# Patient Record
Sex: MALE | Race: WHITE | NOT HISPANIC OR LATINO | Employment: OTHER | URBAN - METROPOLITAN AREA
[De-identification: names, ages, dates, MRNs, and addresses within clinical notes are randomized per-mention and may not be internally consistent; named-entity substitution may affect disease eponyms.]

---

## 2017-06-15 ENCOUNTER — OFFICE VISIT (OUTPATIENT)
Dept: FAMILY MEDICINE CLINIC | Facility: CLINIC | Age: 27
End: 2017-06-15

## 2017-06-15 VITALS
HEIGHT: 71 IN | HEART RATE: 106 BPM | DIASTOLIC BLOOD PRESSURE: 84 MMHG | TEMPERATURE: 98.4 F | OXYGEN SATURATION: 96 % | BODY MASS INDEX: 19.25 KG/M2 | SYSTOLIC BLOOD PRESSURE: 102 MMHG | WEIGHT: 137.5 LBS

## 2017-06-15 DIAGNOSIS — R15.9 FECAL INCONTINENCE: ICD-10-CM

## 2017-06-15 DIAGNOSIS — F41.9 ANXIETY AND DEPRESSION: Primary | ICD-10-CM

## 2017-06-15 DIAGNOSIS — F32.A ANXIETY AND DEPRESSION: Primary | ICD-10-CM

## 2017-06-15 DIAGNOSIS — N52.9 ERECTILE DYSFUNCTION, UNSPECIFIED ERECTILE DYSFUNCTION TYPE: ICD-10-CM

## 2017-06-15 DIAGNOSIS — E10.65 TYPE 1 DIABETES MELLITUS WITH HYPERGLYCEMIA (HCC): ICD-10-CM

## 2017-06-15 PROCEDURE — 99204 OFFICE O/P NEW MOD 45 MIN: CPT | Performed by: NURSE PRACTITIONER

## 2017-06-15 RX ORDER — PAROXETINE HYDROCHLORIDE 40 MG/1
20 TABLET, FILM COATED ORAL 2 TIMES DAILY
COMMUNITY
Start: 2017-04-05 | End: 2020-11-04

## 2017-06-15 RX ORDER — PEN NEEDLE, DIABETIC 32GX 5/32"
NEEDLE, DISPOSABLE MISCELLANEOUS
COMMUNITY
Start: 2017-05-19 | End: 2020-12-30

## 2017-06-15 RX ORDER — INSULIN GLULISINE 100 [IU]/ML
INJECTION, SOLUTION SUBCUTANEOUS
COMMUNITY
Start: 2017-05-21 | End: 2019-10-08

## 2017-06-15 NOTE — PROGRESS NOTES
Subjective   Morro Blancas is a 26 y.o. male who presents today for:    Diabetes (DM2); Depression; Anxiety; Erectile Dysfunction; Insomnia; and Involuntary Bowel Movement (Having bowel movements in sleep)    HPI Comments: Mr. Blancas presents today to establish care with a PCP. He reports a medical history of Type 1 DM (diagnosed at the age of 17, which was 9 years ago), he is under the care of an endocrinologist. He also has a history of anxiety and depression. He reports being evaluated at [facility] for the anxiety and depression, was given a list of psychiatrists to follow up with and reports having difficulty finding one whom will take his insurance.     Diabetes   He presents for his initial (States he sees his endocrinologist today) diabetic visit. He has type 1 diabetes mellitus. Disease course: he states he is in poor control. Hypoglycemia symptoms include nervousness/anxiousness. Associated symptoms include polydipsia, polyuria and weight loss. Pertinent negatives for diabetes include no blurred vision, no chest pain, no fatigue and no visual change. There are no hypoglycemic complications. Diabetic complications include impotence. Pertinent negatives for diabetic complications include no autonomic neuropathy, nephropathy, peripheral neuropathy, PVD or retinopathy. Risk factors for coronary artery disease include diabetes mellitus. Current diabetic treatment includes insulin injections. He is compliant with treatment all of the time.   Anxiety   Presents for initial visit. Onset was at an unknown time. The problem has been waxing and waning. Symptoms include decreased concentration, excessive worry, impotence, insomnia, nervous/anxious behavior and obsessions. Patient reports no chest pain or palpitations. Symptoms occur most days. The severity of symptoms is causing significant distress. Nothing aggravates the symptoms. The quality of sleep is poor.     Past treatments include SSRIs. The treatment  "provided moderate relief. Compliance with prior treatments has been good.      I have reviewed the patient's medical history in detail and updated the computerized patient record.    Mr. Blancas  reports that he has been smoking Cigarettes.  He has never used smokeless tobacco. He reports that he drinks alcohol. He reports that he does not use illicit drugs.         Current Outpatient Prescriptions:   •  APIDRA SOLOSTAR 100 UNIT/ML solution pen-injector, , Disp: , Rfl:   •  BD PEN NEEDLE HENRY U/F 32G X 4 MM misc, , Disp: , Rfl:   •  Insulin Glargine (TOUJEO SOLOSTAR) 300 UNIT/ML solution pen-injector, Inject  under the skin., Disp: , Rfl:   •  PARoxetine (PAXIL) 40 MG tablet, Take 40 mg by mouth Daily., Disp: , Rfl:       The following portions of the patient's history were reviewed and updated as appropriate: allergies, current medications, past social history and problem list.    Review of Systems   Constitutional: Positive for weight loss. Negative for fatigue.   HENT: Negative.    Eyes: Negative.  Negative for blurred vision.   Respiratory: Negative.    Cardiovascular: Negative for chest pain and palpitations.   Gastrointestinal: Negative.    Endocrine: Positive for polydipsia and polyuria.   Genitourinary: Positive for impotence.   Musculoskeletal: Negative.    Skin: Negative.    Allergic/Immunologic: Negative.    Neurological: Negative.    Psychiatric/Behavioral: Positive for decreased concentration. The patient is nervous/anxious and has insomnia.          Objective   Vitals:    06/15/17 1049   BP: 102/84   BP Location: Left arm   Patient Position: Sitting   Cuff Size: Adult   Pulse: 106   Temp: 98.4 °F (36.9 °C)   TempSrc: Oral   SpO2: 96%   Weight: 137 lb 8 oz (62.4 kg)   Height: 70.75\" (179.7 cm)     Physical Exam   Constitutional: He is oriented to person, place, and time. He appears well-developed and well-nourished.   HENT:   Head: Normocephalic.   Right Ear: External ear normal.   Left Ear: External " ear normal.   Nose: Nose normal.   Mouth/Throat: Oropharynx is clear and moist.   Eyes: Conjunctivae and EOM are normal. Pupils are equal, round, and reactive to light.   Neck: Normal range of motion. Neck supple. No JVD present. No tracheal deviation present. No thyromegaly present.   Cardiovascular: Normal rate, regular rhythm, normal heart sounds and intact distal pulses.  Exam reveals no gallop and no friction rub.    No murmur heard.  Pulmonary/Chest: Effort normal and breath sounds normal.   Abdominal: Soft. Bowel sounds are normal. He exhibits no distension and no mass. There is no tenderness. There is no rebound and no guarding. No hernia.   Musculoskeletal: Normal range of motion. He exhibits no edema, tenderness or deformity.   Lymphadenopathy:     He has no cervical adenopathy.   Neurological: He is alert and oriented to person, place, and time.   Skin: Skin is warm and dry.   Psychiatric:   No acute distress   Vitals reviewed.        Assessment/Plan   Morro was seen today for diabetes, depression, anxiety, erectile dysfunction, insomnia and involuntary bowel movement.    Diagnoses and all orders for this visit:    Anxiety and depression  -     Ambulatory Referral to Psychiatry    Erectile dysfunction, unspecified erectile dysfunction type  -     Ambulatory Referral to Urology    Fecal incontinence  -     Ambulatory Referral For Screening Colonoscopy    Type 1 diabetes mellitus with hyperglycemia    1. Type 1 DM. Patient is to follow up with endocrinology as scheduled. I have asked that his labs and medical records be obtained for his endocrinologist's office for my review.  2. Anxiety/depression. I have sent in a referral to Psychiatry for further evaluation and management.  3. ED. I have sent in a referral for Urology for further evaluation and management.  4. I am ordering a colonoscopy for evaluation of fecal incontinence.  5. Follow up with me in 6 months or as needed.

## 2017-06-27 ENCOUNTER — PREP FOR SURGERY (OUTPATIENT)
Dept: OTHER | Facility: HOSPITAL | Age: 27
End: 2017-06-27

## 2017-06-27 DIAGNOSIS — R19.7 DIARRHEA, UNSPECIFIED TYPE: Primary | ICD-10-CM

## 2017-08-30 ENCOUNTER — HOSPITAL ENCOUNTER (OUTPATIENT)
Facility: HOSPITAL | Age: 27
Setting detail: HOSPITAL OUTPATIENT SURGERY
Discharge: HOME | End: 2017-08-30
Attending: INTERNAL MEDICINE | Admitting: INTERNAL MEDICINE

## 2017-08-30 ENCOUNTER — ANESTHESIA (OUTPATIENT)
Dept: GASTROENTEROLOGY | Facility: HOSPITAL | Age: 27
End: 2017-08-30

## 2017-08-30 ENCOUNTER — ANESTHESIA EVENT (OUTPATIENT)
Dept: GASTROENTEROLOGY | Facility: HOSPITAL | Age: 27
End: 2017-08-30

## 2017-08-30 VITALS
RESPIRATION RATE: 15 BRPM | BODY MASS INDEX: 19.32 KG/M2 | HEIGHT: 69 IN | TEMPERATURE: 97.4 F | DIASTOLIC BLOOD PRESSURE: 91 MMHG | HEART RATE: 95 BPM | OXYGEN SATURATION: 96 % | WEIGHT: 130.44 LBS | SYSTOLIC BLOOD PRESSURE: 124 MMHG

## 2017-08-30 DIAGNOSIS — R19.7 DIARRHEA, UNSPECIFIED TYPE: ICD-10-CM

## 2017-08-30 LAB — GLUCOSE BLDC GLUCOMTR-MCNC: 205 MG/DL (ref 70–130)

## 2017-08-30 PROCEDURE — S0260 H&P FOR SURGERY: HCPCS | Performed by: INTERNAL MEDICINE

## 2017-08-30 PROCEDURE — 45380 COLONOSCOPY AND BIOPSY: CPT | Performed by: INTERNAL MEDICINE

## 2017-08-30 PROCEDURE — 25010000002 PROPOFOL 10 MG/ML EMULSION: Performed by: ANESTHESIOLOGY

## 2017-08-30 PROCEDURE — 82962 GLUCOSE BLOOD TEST: CPT

## 2017-08-30 PROCEDURE — 88305 TISSUE EXAM BY PATHOLOGIST: CPT | Performed by: INTERNAL MEDICINE

## 2017-08-30 RX ORDER — SODIUM CHLORIDE, SODIUM LACTATE, POTASSIUM CHLORIDE, CALCIUM CHLORIDE 600; 310; 30; 20 MG/100ML; MG/100ML; MG/100ML; MG/100ML
30 INJECTION, SOLUTION INTRAVENOUS CONTINUOUS PRN
Status: DISCONTINUED | OUTPATIENT
Start: 2017-08-30 | End: 2017-08-30 | Stop reason: HOSPADM

## 2017-08-30 RX ORDER — LIDOCAINE HYDROCHLORIDE 20 MG/ML
INJECTION, SOLUTION INFILTRATION; PERINEURAL AS NEEDED
Status: DISCONTINUED | OUTPATIENT
Start: 2017-08-30 | End: 2017-08-30 | Stop reason: SURG

## 2017-08-30 RX ORDER — PROPOFOL 10 MG/ML
VIAL (ML) INTRAVENOUS AS NEEDED
Status: DISCONTINUED | OUTPATIENT
Start: 2017-08-30 | End: 2017-08-30 | Stop reason: SURG

## 2017-08-30 RX ADMIN — PROPOFOL 200 MG: 10 INJECTION, EMULSION INTRAVENOUS at 09:24

## 2017-08-30 RX ADMIN — LIDOCAINE HYDROCHLORIDE 100 MG: 20 INJECTION, SOLUTION INFILTRATION; PERINEURAL at 09:24

## 2017-08-30 RX ADMIN — SODIUM CHLORIDE, POTASSIUM CHLORIDE, SODIUM LACTATE AND CALCIUM CHLORIDE 30 ML/HR: 600; 310; 30; 20 INJECTION, SOLUTION INTRAVENOUS at 09:08

## 2017-08-30 RX ADMIN — PROPOFOL 200 MG: 10 INJECTION, EMULSION INTRAVENOUS at 09:30

## 2017-08-30 NOTE — ANESTHESIA PREPROCEDURE EVALUATION
Anesthesia Evaluation     Patient summary reviewed and Nursing notes reviewed   NPO Solid Status: > 8 hours  NPO Liquid Status: > 8 hours     Airway   Mallampati: II  TM distance: <3 FB  Neck ROM: full  no difficulty expected, small opening and Narrow palate  Dental - normal exam   (+) poor dentition    Pulmonary - normal exam   Cardiovascular - normal exam    Rate: abnormal        Neuro/Psych  (+) psychiatric history,    GI/Hepatic/Renal/Endo    (+)  diabetes mellitus type 1 well controlled using insulin,     Musculoskeletal     Abdominal  - normal exam    Bowel sounds: normal.   Substance History      OB/GYN          Other                                        Anesthesia Plan    ASA 2     MAC     Anesthetic plan and risks discussed with patient.

## 2017-08-30 NOTE — ANESTHESIA POSTPROCEDURE EVALUATION
Patient: Morro Gividen    Procedure Summary     Date Anesthesia Start Anesthesia Stop Room / Location    08/30/17 0924 0953  [facility]       Procedure Diagnosis Surgeon Provider    COLONOSCOPY TO CECUM AND TERM. ILEUM WITH BIOPSIES AND COLD POLYPECTOMY (N/A ) Diarrhea, unspecified type  (Diarrhea, unspecified type [R19.7]) MD Criselda Mcwilliams MD          Anesthesia Type: MAC  Last vitals  BP   124/91 (08/30/17 1013)    Temp   36.3 °C (97.4 °F) (08/30/17 1013)    Pulse   95 (08/30/17 1013)   Resp   15 (08/30/17 1013)    SpO2   96 % (08/30/17 1013)      Post Anesthesia Care and Evaluation    Patient location during evaluation: PACU  Patient participation: complete - patient participated  Level of consciousness: awake and alert  Pain score: 0  Pain management: adequate  Airway patency: patent  Anesthetic complications: No anesthetic complications    Cardiovascular status: acceptable  Respiratory status: acceptable  Hydration status: acceptable

## 2017-08-31 ENCOUNTER — TELEPHONE (OUTPATIENT)
Dept: GASTROENTEROLOGY | Facility: CLINIC | Age: 27
End: 2017-08-31

## 2017-08-31 DIAGNOSIS — K59.1 FUNCTIONAL DIARRHEA: Primary | ICD-10-CM

## 2017-08-31 LAB
CYTO UR: NORMAL
LAB AP CASE REPORT: NORMAL
Lab: NORMAL
PATH REPORT.FINAL DX SPEC: NORMAL
PATH REPORT.GROSS SPEC: NORMAL

## 2017-08-31 RX ORDER — MONTELUKAST SODIUM 4 MG/1
TABLET, CHEWABLE ORAL
Qty: 60 TABLET | Refills: 11 | Status: SHIPPED | OUTPATIENT
Start: 2017-08-31 | End: 2019-10-08

## 2017-09-05 ENCOUNTER — RESULTS ENCOUNTER (OUTPATIENT)
Dept: GASTROENTEROLOGY | Facility: CLINIC | Age: 27
End: 2017-09-05

## 2017-09-05 DIAGNOSIS — K59.1 FUNCTIONAL DIARRHEA: ICD-10-CM

## 2017-09-12 LAB
GLIADIN PEPTIDE IGA SER-ACNC: 6 UNITS (ref 0–19)
GLIADIN PEPTIDE IGG SER-ACNC: 2 UNITS (ref 0–19)
IGA SERPL-MCNC: 369 MG/DL (ref 90–386)
TTG IGA SER-ACNC: <2 U/ML (ref 0–3)
TTG IGG SER-ACNC: <2 U/ML (ref 0–5)

## 2017-09-15 ENCOUNTER — DOCUMENTATION (OUTPATIENT)
Dept: FAMILY MEDICINE CLINIC | Facility: CLINIC | Age: 27
End: 2017-09-15

## 2017-09-15 NOTE — PROGRESS NOTES
I was notified by Betty Mcdonald's office that Mr. Blancas cancelled his psych. evaluation appointment.

## 2017-09-22 ENCOUNTER — TELEPHONE (OUTPATIENT)
Dept: GASTROENTEROLOGY | Facility: CLINIC | Age: 27
End: 2017-09-22

## 2017-09-22 NOTE — TELEPHONE ENCOUNTER
Call to pt.  Advise per DR Aguayo that celiac panel normal.    Pt verb understanding and states that symptoms have stopped.      Update to Dr Aguayo.

## 2017-09-22 NOTE — TELEPHONE ENCOUNTER
----- Message from Lena Aguayo MD sent at 9/12/2017  9:57 PM EDT -----  Celiac panel was normal - how are his symptoms?

## 2017-12-15 ENCOUNTER — OFFICE VISIT (OUTPATIENT)
Dept: FAMILY MEDICINE CLINIC | Facility: CLINIC | Age: 27
End: 2017-12-15

## 2017-12-15 VITALS
BODY MASS INDEX: 18.22 KG/M2 | OXYGEN SATURATION: 94 % | HEART RATE: 111 BPM | HEIGHT: 69 IN | WEIGHT: 123 LBS | DIASTOLIC BLOOD PRESSURE: 82 MMHG | SYSTOLIC BLOOD PRESSURE: 110 MMHG | TEMPERATURE: 97.6 F

## 2017-12-15 DIAGNOSIS — Z13.220 SCREENING FOR HYPERLIPIDEMIA: Primary | ICD-10-CM

## 2017-12-15 DIAGNOSIS — E10.65 TYPE 1 DIABETES MELLITUS WITH HYPERGLYCEMIA (HCC): ICD-10-CM

## 2017-12-15 LAB
ALBUMIN SERPL-MCNC: 4.7 G/DL (ref 3.5–5.2)
ALBUMIN/GLOB SERPL: 1.7 G/DL
ALP SERPL-CCNC: 93 U/L (ref 39–117)
ALT SERPL-CCNC: 13 U/L (ref 1–41)
AST SERPL-CCNC: 9 U/L (ref 1–40)
BILIRUB SERPL-MCNC: 0.9 MG/DL (ref 0.1–1.2)
BUN SERPL-MCNC: 12 MG/DL (ref 6–20)
BUN/CREAT SERPL: 12.9 (ref 7–25)
CALCIUM SERPL-MCNC: 9.9 MG/DL (ref 8.6–10.5)
CHLORIDE SERPL-SCNC: 98 MMOL/L (ref 98–107)
CHOLEST SERPL-MCNC: 147 MG/DL (ref 0–200)
CHOLEST/HDLC SERPL: 2.58 {RATIO}
CO2 SERPL-SCNC: 27.7 MMOL/L (ref 22–29)
CREAT SERPL-MCNC: 0.93 MG/DL (ref 0.76–1.27)
ERYTHROCYTE [DISTWIDTH] IN BLOOD BY AUTOMATED COUNT: 13.3 % (ref 11.5–14.5)
GFR SERPLBLD CREATININE-BSD FMLA CKD-EPI: 118 ML/MIN/1.73
GFR SERPLBLD CREATININE-BSD FMLA CKD-EPI: 97 ML/MIN/1.73
GLOBULIN SER CALC-MCNC: 2.7 GM/DL
GLUCOSE SERPL-MCNC: 248 MG/DL (ref 65–99)
HBA1C MFR BLD: 10.9 % (ref 4.8–5.6)
HCT VFR BLD AUTO: 48.9 % (ref 40.4–52.2)
HDLC SERPL-MCNC: 57 MG/DL (ref 40–60)
HGB BLD-MCNC: 16.7 G/DL (ref 13.7–17.6)
LDLC SERPL CALC-MCNC: 64 MG/DL (ref 0–100)
MCH RBC QN AUTO: 30.4 PG (ref 27–32.7)
MCHC RBC AUTO-ENTMCNC: 34.2 G/DL (ref 32.6–36.4)
MCV RBC AUTO: 88.9 FL (ref 79.8–96.2)
PLATELET # BLD AUTO: 316 10*3/MM3 (ref 140–500)
POTASSIUM SERPL-SCNC: 5.1 MMOL/L (ref 3.5–5.2)
PROT SERPL-MCNC: 7.4 G/DL (ref 6–8.5)
RBC # BLD AUTO: 5.5 10*6/MM3 (ref 4.6–6)
SODIUM SERPL-SCNC: 141 MMOL/L (ref 136–145)
TRIGL SERPL-MCNC: 131 MG/DL (ref 0–150)
VLDLC SERPL CALC-MCNC: 26.2 MG/DL (ref 5–40)
WBC # BLD AUTO: 11.01 10*3/MM3 (ref 4.5–10.7)

## 2017-12-15 PROCEDURE — 99214 OFFICE O/P EST MOD 30 MIN: CPT | Performed by: NURSE PRACTITIONER

## 2017-12-15 RX ORDER — INSULIN GLARGINE 100 [IU]/ML
INJECTION, SOLUTION SUBCUTANEOUS
COMMUNITY
End: 2019-10-08 | Stop reason: ALTCHOICE

## 2017-12-15 NOTE — PROGRESS NOTES
Subjective   Morro Blancas is a 27 y.o. male.     Diabetes; Anxiety; and Depression     HPI Comments: Mr. Blancas presents today for a 6 month follow up on his anxiety/deprssion and Type 1 diabetes. He is being follow by Dr. Javier for his diabetes management. He reports that he has been wearing a continuous glucose monitor for the past month. He reports that his levels are averaging on the high side. He brought with him an order to have an A1C to be done today and faxed to Dr. Javier's office.    He states that he continues to take the Paroxetine 40 mg for his anxiety and depression. I had inquired with him why he did not show up at his appointment with Betty Mcdonald for psychotherapy evaluation and he stated that his insurance would not cover the appointment.     Diabetes   He has type 1 diabetes mellitus. No MedicAlert identification noted. The initial diagnosis of diabetes was made 10 years ago. Hypoglycemia symptoms include nervousness/anxiousness. Associated symptoms include blurred vision, fatigue, foot paresthesias, polyuria and weakness. Hypoglycemia complications include nocturnal hypoglycemia and required assistance. Symptoms are worsening. Diabetic complications include PVD. Risk factors for coronary artery disease include family history and tobacco exposure. Current diabetic treatment includes insulin injections. He is currently taking insulin pre-breakfast, pre-lunch, pre-dinner and at bedtime. Insulin injections are given by patient. Rotation sites for injection include the abdominal wall and thighs. His weight is fluctuating minimally. He is following a diabetic diet. Meal planning includes carbohydrate counting. He has not had a previous visit with a dietitian. He participates in exercise intermittently. He monitors blood glucose at home 3-4 x per day. He monitors urine at home <1 x per month. Blood glucose monitoring compliance is excellent. His home blood glucose trend is fluctuating minimally  (wears a continuous glucose monitor). His breakfast blood glucose is taken between 9-10 am. His breakfast blood glucose range is generally 70-90 mg/dl. His lunch blood glucose is taken after 3 pm. His lunch blood glucose range is generally >200 mg/dl. His dinner blood glucose is taken after 8 pm. His dinner blood glucose range is generally 130-140 mg/dl. His highest blood glucose is >200 mg/dl. His overall blood glucose range is 180-200 mg/dl. He does not see a podiatrist.Eye exam is not current.   Anxiety   Presents for follow-up visit. Symptoms include decreased concentration, depressed mood and nervous/anxious behavior. Symptoms occur most days. The severity of symptoms is moderate.     His past medical history is significant for depression.   Depression   Visit Type: follow-up  Patient presents with the following symptoms: decreased concentration, depressed mood and nervousness/anxiety.  Frequency of symptoms: most days   Compliance with medications:  %            The following portions of the patient's history were reviewed and updated as appropriate: allergies, current medications, past family history, past medical history, past social history, past surgical history and problem list.       Current Outpatient Prescriptions:   •  APIDRA SOLOSTAR 100 UNIT/ML solution pen-injector, , Disp: , Rfl:   •  BD PEN NEEDLE HENRY U/F 32G X 4 MM misc, , Disp: , Rfl:   •  colestipol (COLESTID) 1 g tablet, Start with one pill daily and increase to 2 daily if no improvement after 2 weeks, Disp: 60 tablet, Rfl: 11  •  Insulin Glargine (BASAGLAR KWIKPEN) 100 UNIT/ML injection pen, Inject  under the skin., Disp: , Rfl:   •  PARoxetine (PAXIL) 40 MG tablet, Take 20 mg by mouth 2 (Two) Times a Day., Disp: , Rfl:   •  Insulin Glargine (TOUJEO SOLOSTAR) 300 UNIT/ML solution pen-injector, Inject  under the skin., Disp: , Rfl:     Review of Systems   Constitutional: Positive for fatigue.   Eyes: Positive for blurred vision.    Respiratory: Negative.    Cardiovascular: Negative.    Endocrine: Positive for polyuria.   Skin: Negative.    Neurological: Positive for weakness.   Psychiatric/Behavioral: Positive for decreased concentration. The patient is nervous/anxious.        Objective    Vitals:    12/15/17 0951   BP: 110/82   Pulse: 111   Temp: 97.6 °F (36.4 °C)   SpO2: 94%     Physical Exam   Constitutional: He is oriented to person, place, and time. He appears well-developed and well-nourished.   HENT:   His teeth are discolored.    Cardiovascular: Normal rate, regular rhythm, normal heart sounds and intact distal pulses.    Pulmonary/Chest: Effort normal and breath sounds normal.   Neurological: He is alert and oriented to person, place, and time.   Skin: Skin is warm and dry.   Psychiatric:   No acute distress   Vitals reviewed.      Assessment/Plan   Morro was seen today for diabetes, anxiety and depression.    Diagnoses and all orders for this visit:    Screening for hyperlipidemia  -     Lipid Panel With / Chol / HDL Ratio    Type 1 diabetes mellitus with hyperglycemia  -     CBC (No Diff)  -     Comprehensive Metabolic Panel  -     Hemoglobin A1c       1. I have ordered a CBC, CMP, lipids and A1C to be done today. The results of the A1C will be faxed to Dr. Javier.  2. We discussed keeping up with his dental health and also that he needs to follow up with an eye exam.  3. He is to continue all medications as prescribed.  4. Follow up in 1 year.

## 2017-12-18 NOTE — PROGRESS NOTES
Please call the patient regarding his abnormal result. Let him know we will send the results to Dr. Hamilton

## 2019-10-08 ENCOUNTER — OFFICE VISIT (OUTPATIENT)
Dept: GASTROENTEROLOGY | Facility: CLINIC | Age: 29
End: 2019-10-08

## 2019-10-08 VITALS
SYSTOLIC BLOOD PRESSURE: 116 MMHG | HEIGHT: 69 IN | WEIGHT: 130.8 LBS | BODY MASS INDEX: 19.37 KG/M2 | DIASTOLIC BLOOD PRESSURE: 70 MMHG | TEMPERATURE: 98.2 F

## 2019-10-08 DIAGNOSIS — R68.81 EARLY SATIETY: ICD-10-CM

## 2019-10-08 DIAGNOSIS — E10.65 TYPE 1 DIABETES MELLITUS WITH HYPERGLYCEMIA (HCC): ICD-10-CM

## 2019-10-08 DIAGNOSIS — R15.2 INCONTINENCE OF FECES WITH FECAL URGENCY: ICD-10-CM

## 2019-10-08 DIAGNOSIS — K59.00 CONSTIPATION, UNSPECIFIED CONSTIPATION TYPE: ICD-10-CM

## 2019-10-08 DIAGNOSIS — R15.9 INCONTINENCE OF FECES WITH FECAL URGENCY: ICD-10-CM

## 2019-10-08 DIAGNOSIS — K21.9 GASTROESOPHAGEAL REFLUX DISEASE, ESOPHAGITIS PRESENCE NOT SPECIFIED: Primary | ICD-10-CM

## 2019-10-08 PROCEDURE — 99214 OFFICE O/P EST MOD 30 MIN: CPT | Performed by: NURSE PRACTITIONER

## 2019-10-08 RX ORDER — PANTOPRAZOLE SODIUM 40 MG/1
40 TABLET, DELAYED RELEASE ORAL DAILY
Qty: 30 TABLET | Refills: 11 | Status: SHIPPED | OUTPATIENT
Start: 2019-10-08 | End: 2020-08-05 | Stop reason: ALTCHOICE

## 2019-10-08 NOTE — PROGRESS NOTES
Chief Complaint   Patient presents with   • Diarrhea     x 2-3 years       Morro Blancas is a  29 y.o. male here for a follow up visit for diarrhea.    HPI  29-year-old male presents today accompanied by his significant other for follow-up visit for diarrhea and GERD.  He is a patient of Dr. Aguayo.  He has not been seen since 2017.  He underwent screening colonoscopy on 8/30/2017 that showed 1 polyp in the sigmoid colon.  Path was positive for hyperplastic polyp.  He reports for the past 2 to 3 years he has had chronic constipation where he will have a bowel movement for 2 to 3 to 4 days and then he might have diarrhea with fecal incontinence at night while he is asleep.  He tells me there is no rhyme or reason to this.  He does not think foods make it better or worse.  He does have a history of type 1 diabetes and admits he does not follow a low-carb diet.  He does feel full a lot and complains of a lot of nausea and bad breath with reflux symptoms.  He has taken Prilosec over-the-counter every once in a while but nothing routinely.  He has never had an EGD before.  He has never had a gastric emptying study done before.  He has not tried anything over-the-counter for the constipation.  He also uses tobacco but denies any alcohol.  He denies any dysphagia, abdominal pain, vomiting, rectal bleeding or melena.  He admits his appetite's okay and his weight is stable.  He tells me his last hemoglobin A1c was good for him at 8.1.  He tells me he does like to eat late at night and he does not eat a lot of greasy fried foods.    Past Medical History:   Diagnosis Date   • Anxiety    • Anxiety and depression    • Depression    • Diabetes type 1, uncontrolled (CMS/HCC)     diagnosed at age 17       Past Surgical History:   Procedure Laterality Date   • COLONOSCOPY N/A 8/30/2017    One 3 mm polyp in the sigmoid colon, Granularity in the terminal ileum       Scheduled Meds:    Continuous Infusions:  No current  facility-administered medications for this visit.     PRN Meds:.    No Known Allergies    Social History     Socioeconomic History   • Marital status: Single     Spouse name: Not on file   • Number of children: Not on file   • Years of education: Not on file   • Highest education level: Not on file   Tobacco Use   • Smoking status: Current Every Day Smoker     Packs/day: 1.00     Types: Cigarettes     Start date: 2005   • Smokeless tobacco: Never Used   Substance and Sexual Activity   • Alcohol use: Yes     Comment: occasional   • Drug use: No   • Sexual activity: Defer       Family History   Problem Relation Age of Onset   • Cancer Mother    • Hypertension Father    • Depression Paternal Uncle    • Hypertension Maternal Grandmother    • Hypertension Maternal Grandfather    • Alcohol abuse Maternal Grandfather    • Hypertension Paternal Grandmother    • Cancer Paternal Grandmother    • COPD Paternal Grandmother    • Depression Paternal Grandmother    • Heart disease Paternal Grandfather    • Hypertension Paternal Grandfather        Review of Systems   Constitutional: Negative for appetite change, chills, diaphoresis, fatigue, fever and unexpected weight change.   HENT: Negative for nosebleeds, postnasal drip, sore throat, trouble swallowing and voice change.    Respiratory: Negative for cough, choking, chest tightness, shortness of breath and wheezing.    Cardiovascular: Negative for chest pain, palpitations and leg swelling.   Gastrointestinal: Positive for abdominal distention, constipation, diarrhea and nausea. Negative for abdominal pain, anal bleeding, blood in stool, rectal pain and vomiting.   Endocrine: Negative for polydipsia, polyphagia and polyuria.   Musculoskeletal: Negative for gait problem.   Skin: Negative for rash and wound.   Allergic/Immunologic: Negative for food allergies.   Neurological: Negative for dizziness, speech difficulty and light-headedness.   Psychiatric/Behavioral: Negative for  confusion, self-injury, sleep disturbance and suicidal ideas.       Vitals:    10/08/19 1547   BP: 116/70   Temp: 98.2 °F (36.8 °C)       Physical Exam   Constitutional: He is oriented to person, place, and time. He appears well-developed and well-nourished. He does not appear ill. No distress.   HENT:   Head: Normocephalic.   Eyes: Pupils are equal, round, and reactive to light.   Cardiovascular: Normal rate, regular rhythm and normal heart sounds.   Pulmonary/Chest: Effort normal and breath sounds normal.   Abdominal: Soft. Bowel sounds are normal. He exhibits distension. He exhibits no mass. There is no hepatosplenomegaly. There is no tenderness. There is no rebound and no guarding. No hernia.   Musculoskeletal: Normal range of motion.   Neurological: He is alert and oriented to person, place, and time.   Skin: Skin is warm and dry.   Psychiatric: He has a normal mood and affect. His speech is normal and behavior is normal. Judgment normal.       No images are attached to the encounter.    Assessment and plan     1. Gastroesophageal reflux disease, esophagitis presence not specified  - NM Gastric Emptying; Future    2. Constipation, unspecified constipation type  - NM Gastric Emptying; Future    3. Incontinence of feces with fecal urgency  - NM Gastric Emptying; Future    4. Early satiety  - NM Gastric Emptying; Future    5. Type 1 diabetes mellitus with hyperglycemia (CMS/HCC)  - NM Gastric Emptying; Future    It sounds like to me he has a couple of issues going on.  Reflux is not well controlled at all.  Recommend he start Protonix 40 mill grams once daily.  Continue GERD precautions.  Had a long discussion with him about constipation and needing a routine bowel regimen.  Sounds like he is getting chronically constipated and then having overflow diarrhea.  Recommend he start daily MiraLAX.  Samples given today.  Also having issues with possible gastroparesis due to his uncontrolled diabetes.  Will get a gastric  emptying study.  Will go ahead and give him a handout on gastroparesis today.  Discussed the need for him to eat smaller more frequent meals and to stay more on a low-carb diet.  Patient to call the office later this week with an update.  Patient to follow-up with me in the office in 2 weeks.

## 2019-10-15 ENCOUNTER — PRIOR AUTHORIZATION (OUTPATIENT)
Dept: GASTROENTEROLOGY | Facility: CLINIC | Age: 29
End: 2019-10-15

## 2019-10-21 ENCOUNTER — HOSPITAL ENCOUNTER (OUTPATIENT)
Dept: NUCLEAR MEDICINE | Facility: HOSPITAL | Age: 29
Discharge: HOME | End: 2019-10-21

## 2019-10-21 DIAGNOSIS — E10.65 TYPE 1 DIABETES MELLITUS WITH HYPERGLYCEMIA (HCC): ICD-10-CM

## 2019-10-21 DIAGNOSIS — R15.2 INCONTINENCE OF FECES WITH FECAL URGENCY: ICD-10-CM

## 2019-10-21 DIAGNOSIS — K21.9 GASTROESOPHAGEAL REFLUX DISEASE, ESOPHAGITIS PRESENCE NOT SPECIFIED: ICD-10-CM

## 2019-10-21 DIAGNOSIS — K59.00 CONSTIPATION, UNSPECIFIED CONSTIPATION TYPE: ICD-10-CM

## 2019-10-21 DIAGNOSIS — R68.81 EARLY SATIETY: ICD-10-CM

## 2019-10-21 DIAGNOSIS — R15.9 INCONTINENCE OF FECES WITH FECAL URGENCY: ICD-10-CM

## 2019-10-21 PROCEDURE — 78264 GASTRIC EMPTYING IMG STUDY: CPT

## 2019-10-21 PROCEDURE — 0 TECHNETIUM SULFUR COLLOID: Performed by: NURSE PRACTITIONER

## 2019-10-21 PROCEDURE — A9541 TC99M SULFUR COLLOID: HCPCS | Performed by: NURSE PRACTITIONER

## 2019-10-21 RX ADMIN — TECHNETIUM TC 99M SULFUR COLLOID 1 DOSE: KIT at 07:00

## 2019-10-22 ENCOUNTER — TELEPHONE (OUTPATIENT)
Dept: GASTROENTEROLOGY | Facility: CLINIC | Age: 29
End: 2019-10-22

## 2019-10-22 NOTE — TELEPHONE ENCOUNTER
----- Message from YUDELKA Almanza sent at 10/22/2019  8:58 AM EDT -----  Please call the patient and let him know on his gastric emptying study it did show delay of 17% when normal is 10% after 4 hours.  So he needs to make an appointment to come in and discuss treatment options and diet modifications.

## 2019-10-24 NOTE — TELEPHONE ENCOUNTER
Called pt and advised per Ellyn juarez this gastric emptying study did show delay of 17% when normal is 10% after 4 hrs.  So he needs to make an appt to come in and discuss treatment options and diet modification.  Pt verb understanding and will call back to make appt.

## 2019-11-12 ENCOUNTER — OFFICE VISIT (OUTPATIENT)
Dept: GASTROENTEROLOGY | Facility: CLINIC | Age: 29
End: 2019-11-12

## 2019-11-12 VITALS
BODY MASS INDEX: 19.96 KG/M2 | HEIGHT: 69 IN | WEIGHT: 134.8 LBS | SYSTOLIC BLOOD PRESSURE: 102 MMHG | DIASTOLIC BLOOD PRESSURE: 68 MMHG | TEMPERATURE: 98.3 F

## 2019-11-12 DIAGNOSIS — K31.84 GASTROPARESIS: ICD-10-CM

## 2019-11-12 DIAGNOSIS — K59.00 CONSTIPATION, UNSPECIFIED CONSTIPATION TYPE: Primary | ICD-10-CM

## 2019-11-12 DIAGNOSIS — K21.9 GASTROESOPHAGEAL REFLUX DISEASE, ESOPHAGITIS PRESENCE NOT SPECIFIED: ICD-10-CM

## 2019-11-12 PROCEDURE — 99214 OFFICE O/P EST MOD 30 MIN: CPT | Performed by: NURSE PRACTITIONER

## 2019-11-12 NOTE — PROGRESS NOTES
Chief Complaint   Patient presents with   • Follow-up   • Heartburn   • GI Problem     delayed GES       Morro Blancas is a  29 y.o. male here for a follow up visit for constipation.    HPI  Neuro male presents today for follow-up visit for constipation, GERD and gastroparesis.  He is a patient of Dr. Aguayo.  He was last seen in the office on 10/8/2019.  He is happy to report that his reflux symptoms have been greatly improved on the Protonix 40 mill grams once daily.  He has tried the MiraLAX and he admits his has helped his constipation but is not 100%.  He would like to try something else if he could.  He did have a gastric emptying study done which did show some delay in his gastric emptying at 17%.  He does tell me that he and his girlfriend had a long discussion about her diet and they have stopped eating greasy fried foods and he is trying to eat smaller more frequent meals.  He tells me this has helped and he is no longer having any nausea or vomiting.  He also has a history of type 1 diabetes and is telling me that he is working hard on his glucose control.  He denies any dysphagia, abdominal pain, diarrhea, rectal bleeding or melena.  He admits his appetite is okay and his weight is gone up 4 pounds since his last visit.  He is happy to report that he is no longer waking up having bowel movements in his sleep.  Past Medical History:   Diagnosis Date   • Anxiety    • Anxiety and depression    • Colon polyp 8/30/2017    1   • Depression    • Diabetes type 1, uncontrolled (CMS/[facility])     diagnosed at age 17       Past Surgical History:   Procedure Laterality Date   • COLONOSCOPY N/A 8/30/2017    One 3 mm polyp in the sigmoid colon, Granularity in the terminal ileum       Scheduled Meds:    Continuous Infusions:  No current facility-administered medications for this visit.     PRN Meds:.    No Known Allergies    Social History     Socioeconomic History   • Marital status: Single     Spouse name: Not on file   •  Number of children: Not on file   • Years of education: Not on file   • Highest education level: Not on file   Tobacco Use   • Smoking status: Current Every Day Smoker     Packs/day: 1.00     Types: Cigarettes     Start date: 2005   • Smokeless tobacco: Never Used   Substance and Sexual Activity   • Alcohol use: Yes     Comment: occasional   • Drug use: No   • Sexual activity: Defer       Family History   Problem Relation Age of Onset   • Cancer Mother    • Hypertension Father    • Depression Paternal Uncle    • Hypertension Maternal Grandmother    • Irritable bowel syndrome Maternal Grandmother    • Hypertension Maternal Grandfather    • Alcohol abuse Maternal Grandfather    • Hypertension Paternal Grandmother    • Cancer Paternal Grandmother    • COPD Paternal Grandmother    • Depression Paternal Grandmother    • Heart disease Paternal Grandfather    • Hypertension Paternal Grandfather        Review of Systems   Constitutional: Negative for appetite change, chills, diaphoresis, fatigue, fever and unexpected weight change.   HENT: Negative for nosebleeds, postnasal drip, sore throat, trouble swallowing and voice change.    Respiratory: Negative for cough, choking, chest tightness, shortness of breath and wheezing.    Cardiovascular: Negative for chest pain, palpitations and leg swelling.   Gastrointestinal: Negative for abdominal distention, abdominal pain, anal bleeding, blood in stool, constipation, diarrhea, nausea, rectal pain and vomiting.   Endocrine: Negative for polydipsia, polyphagia and polyuria.   Musculoskeletal: Negative for gait problem.   Skin: Negative for rash and wound.   Allergic/Immunologic: Negative for food allergies.   Neurological: Negative for dizziness, speech difficulty and light-headedness.   Psychiatric/Behavioral: Negative for confusion, self-injury, sleep disturbance and suicidal ideas.       Vitals:    11/12/19 0853   BP: 102/68   Temp: 98.3 °F (36.8 °C)       Physical Exam    Constitutional: He is oriented to person, place, and time. He appears well-developed and well-nourished. He does not appear ill. No distress.   HENT:   Head: Normocephalic.   Eyes: Pupils are equal, round, and reactive to light.   Cardiovascular: Normal rate, regular rhythm and normal heart sounds.   Pulmonary/Chest: Effort normal and breath sounds normal.   Abdominal: Soft. Bowel sounds are normal. He exhibits no distension and no mass. There is no hepatosplenomegaly. There is no tenderness. There is no rebound and no guarding. No hernia.   Musculoskeletal: Normal range of motion.   Neurological: He is alert and oriented to person, place, and time.   Skin: Skin is warm and dry.   Psychiatric: He has a normal mood and affect. His speech is normal and behavior is normal. Judgment normal.       No images are attached to the encounter.    Assessment and plan    1. Constipation, unspecified constipation type    2. Gastroparesis    3. Gastroesophageal reflux disease, esophagitis presence not specified    Reviewed gastric emptying study results with him today.  It did show some delay at 17% with normal being under 10%.  Another discussion with him today about diet modifications.  He seems to be already doing better.  We will go ahead and give him some samples of Amitiza 8 and 24 to see how his bowels respond to that.  He can go ahead and stop the MiraLAX.  GERD seems much improved on Protonix 40 mg once daily.  Continue GERD precautions.  Patient to call the office next week with an update.  Patient to follow-up with me or Dr. Aguayo in 3 months.

## 2020-08-03 ENCOUNTER — TELEPHONE (OUTPATIENT)
Dept: GASTROENTEROLOGY | Facility: CLINIC | Age: 30
End: 2020-08-03

## 2020-08-03 NOTE — TELEPHONE ENCOUNTER
----- Message from Morro Blancas sent at 8/2/2020  6:18 PM EDT -----  Regarding: Prescription Question  Contact: [phone]  My acid reflux has been getting bad and I’ve been taking two protonics a day. Is it possible to get a prescription for dexalant. Someone I know takes it and said it helps her

## 2020-08-05 RX ORDER — DEXLANSOPRAZOLE 60 MG/1
CAPSULE, DELAYED RELEASE ORAL
Qty: 30 CAPSULE | Refills: 3 | Status: SHIPPED | OUTPATIENT
Start: 2020-08-05 | End: 2020-11-04 | Stop reason: ALTCHOICE

## 2020-08-05 NOTE — TELEPHONE ENCOUNTER
Charo completed for dexilant 60 mg 1 tab po daily, #30, R3.     VM to pt - advise of above.  Contact office if any questions.

## 2020-08-21 ENCOUNTER — TELEPHONE (OUTPATIENT)
Dept: GASTROENTEROLOGY | Facility: CLINIC | Age: 30
End: 2020-08-21

## 2020-08-21 NOTE — TELEPHONE ENCOUNTER
----- Message from Morro Blancas sent at 8/21/2020 11:10 AM EDT -----  Regarding: Prescription Question  Contact: [phone]  I am having some severe acid reflux recently to the point where I am nauseous and throwing up, is it possible to increase my prescription

## 2020-08-26 NOTE — TELEPHONE ENCOUNTER
I would have him continue the Dexilant 60 mg once daily and add Pepcid 20 mg twice daily onto his regimen.  See if that helps.  Have him call us next week with an update.

## 2020-10-12 ENCOUNTER — TELEPHONE (OUTPATIENT)
Dept: GASTROENTEROLOGY | Facility: CLINIC | Age: 30
End: 2020-10-12

## 2020-10-12 NOTE — TELEPHONE ENCOUNTER
Called pt and to clarify which pharmacy to send dexilant.  Pt requesting [facility] [address] and we do not have this [facility] listed.     Called pt and left vm for pt to call back.

## 2020-10-12 NOTE — TELEPHONE ENCOUNTER
----- Message from Morro Blancas sent at 10/10/2020  9:19 AM EDT -----  Regarding: Prescription Question  Contact: [phone]  Can you send a prescription for dexilant to my pharmacy, [facility] [address]?

## 2020-10-14 NOTE — TELEPHONE ENCOUNTER
Pt last seen 11/12/19.  See dexilant rx of 8/5/20 - #30, R3.     Pharmacy info updated.      VM to pt with request to contact office - needs appt in Nov.

## 2020-11-04 ENCOUNTER — OFFICE VISIT (OUTPATIENT)
Dept: GASTROENTEROLOGY | Facility: CLINIC | Age: 30
End: 2020-11-04

## 2020-11-04 VITALS — TEMPERATURE: 98 F | BODY MASS INDEX: 20.59 KG/M2 | HEIGHT: 69 IN | WEIGHT: 139 LBS

## 2020-11-04 DIAGNOSIS — Z80.0 FAMILY HISTORY OF COLORECTAL CANCER: ICD-10-CM

## 2020-11-04 DIAGNOSIS — K31.84 GASTROPARESIS: ICD-10-CM

## 2020-11-04 DIAGNOSIS — K21.9 GASTROESOPHAGEAL REFLUX DISEASE, UNSPECIFIED WHETHER ESOPHAGITIS PRESENT: Primary | ICD-10-CM

## 2020-11-04 DIAGNOSIS — K63.5 HYPERPLASTIC COLONIC POLYP, UNSPECIFIED PART OF COLON: ICD-10-CM

## 2020-11-04 DIAGNOSIS — K59.00 CONSTIPATION, UNSPECIFIED CONSTIPATION TYPE: ICD-10-CM

## 2020-11-04 PROCEDURE — 99213 OFFICE O/P EST LOW 20 MIN: CPT | Performed by: NURSE PRACTITIONER

## 2020-11-04 RX ORDER — DEXLANSOPRAZOLE 60 MG/1
CAPSULE, DELAYED RELEASE ORAL
Qty: 90 CAPSULE | Refills: 3 | Status: SHIPPED | OUTPATIENT
Start: 2020-11-04 | End: 2021-01-07 | Stop reason: SDUPTHER

## 2020-11-04 RX ORDER — PANTOPRAZOLE SODIUM 40 MG/1
TABLET, DELAYED RELEASE ORAL
COMMUNITY
Start: 2020-08-09 | End: 2020-11-04

## 2020-11-04 NOTE — PROGRESS NOTES
Chief Complaint   Patient presents with   • Heartburn   • Difficulty Swallowing/globus   • belching       Morro Blanacs is a  30 y.o. male here for a follow up visit for GERD.    HPI  30-year-old male presents today for follow-up visit for GERD.  He is a patient of Dr. Aguayo.  He was last seen in the office on 11/12/2019.  He has a history of GERD and admits he does really well as long as he takes Dexilant 60 mg every day.  He denies any breakthrough reflux at this time.  He also has a history of gastroparesis and admits as long as he eats smaller more frequent meals throughout the day he does really well.  He does have a history of chronic constipation but he admits ever since being on the Dexilant he has no longer had any problems with constipation.  He tells me his bowels move regularly every day now.  He does have a history of hyperplastic colon polyp.  His last colonoscopy was in 2017.  He also has a new family history with a maternal grandmother with colorectal cancer he just found out about later this year.  Next screening colonoscopy will be due in 2023.  He denies any dysphagia, reflux, abdominal pain, nausea and vomiting, diarrhea, constipation, rectal bleeding or melena.  He admits his appetite is good and his weight is stable.  He is a type I diabetic.  He tells me he follows up with endocrine regularly.  Past Medical History:   Diagnosis Date   • Anxiety    • Anxiety and depression    • Colon polyp 8/30/2017    1   • Depression    • Diabetes type 1, uncontrolled (CMS/HCC)     diagnosed at age 17   • Dyspepsia    • Family hx of colon cancer    • Gastroparesis        Past Surgical History:   Procedure Laterality Date   • COLONOSCOPY N/A 8/30/2017    One 3 mm polyp in the sigmoid colon, Granularity in the terminal ileum       Scheduled Meds:    Continuous Infusions:No current facility-administered medications for this visit.       PRN Meds:.    No Known Allergies    Social History     Socioeconomic History    • Marital status: Single     Spouse name: Not on file   • Number of children: Not on file   • Years of education: Not on file   • Highest education level: Not on file   Tobacco Use   • Smoking status: Current Every Day Smoker     Packs/day: 0.50     Types: Cigarettes     Start date: 2005   • Smokeless tobacco: Never Used   Substance and Sexual Activity   • Alcohol use: Yes     Comment: occasional   • Drug use: Yes     Types: Marijuana     Comment: 2 x weekly   • Sexual activity: Defer       Family History   Problem Relation Age of Onset   • Cancer Mother    • Hypertension Father    • Depression Paternal Uncle    • Hypertension Maternal Grandmother    • Irritable bowel syndrome Maternal Grandmother    • Colon cancer Maternal Grandmother    • Hypertension Maternal Grandfather    • Alcohol abuse Maternal Grandfather    • Hypertension Paternal Grandmother    • Cancer Paternal Grandmother    • COPD Paternal Grandmother    • Depression Paternal Grandmother    • Heart disease Paternal Grandfather    • Hypertension Paternal Grandfather        Review of Systems   Constitutional: Negative for appetite change, chills, diaphoresis, fatigue, fever and unexpected weight change.   HENT: Negative for nosebleeds, postnasal drip, sore throat, trouble swallowing and voice change.    Respiratory: Negative for cough, choking, chest tightness, shortness of breath and wheezing.    Cardiovascular: Negative for chest pain, palpitations and leg swelling.   Gastrointestinal: Negative for abdominal distention, abdominal pain, anal bleeding, blood in stool, constipation, diarrhea, nausea, rectal pain and vomiting.   Endocrine: Negative for polydipsia, polyphagia and polyuria.   Musculoskeletal: Negative for gait problem.   Skin: Negative for rash and wound.   Allergic/Immunologic: Negative for food allergies.   Neurological: Negative for dizziness, speech difficulty and light-headedness.   Psychiatric/Behavioral: Negative for confusion,  self-injury, sleep disturbance and suicidal ideas.       Vitals:    11/04/20 1506   Temp: 98 °F (36.7 °C)       Physical Exam  Constitutional:       General: He is not in acute distress.     Appearance: He is well-developed. He is not ill-appearing.   HENT:      Head: Normocephalic.   Eyes:      Pupils: Pupils are equal, round, and reactive to light.   Cardiovascular:      Rate and Rhythm: Normal rate and regular rhythm.      Heart sounds: Normal heart sounds.   Pulmonary:      Effort: Pulmonary effort is normal.      Breath sounds: Normal breath sounds.   Abdominal:      General: Bowel sounds are normal. There is no distension.      Palpations: Abdomen is soft. There is no mass.      Tenderness: There is no abdominal tenderness. There is no guarding or rebound.      Hernia: No hernia is present.   Musculoskeletal: Normal range of motion.   Skin:     General: Skin is warm and dry.   Neurological:      Mental Status: He is alert and oriented to person, place, and time.   Psychiatric:         Speech: Speech normal.         Behavior: Behavior normal.         Judgment: Judgment normal.         No radiology results for the last 7 days     Assessment & Plan    1. Gastroesophageal reflux disease, unspecified whether esophagitis present    2. Gastroparesis    3. Constipation, unspecified constipation type    4. Hyperplastic colonic polyp, unspecified part of colon    5. Family history of colorectal cancer    GERD seems well controlled on Dexilant 60 mg once daily.  Continue GERD precautions.  Gastroparesis seems stable as long as he eats smaller more frequent meals throughout the day.  Bowels seem to be moving well.  No longer having issues with constipation.  Overall he seems to be doing really well right now.  Next screening colonoscopy will be due in 2023.  Patient to call the office with any issues.  Patient to follow-up with me in 1 year.

## 2020-11-06 ENCOUNTER — TELEPHONE (OUTPATIENT)
Dept: GASTROENTEROLOGY | Facility: CLINIC | Age: 30
End: 2020-11-06

## 2020-11-10 ENCOUNTER — TELEPHONE (OUTPATIENT)
Dept: GASTROENTEROLOGY | Facility: CLINIC | Age: 30
End: 2020-11-10

## 2020-11-10 NOTE — TELEPHONE ENCOUNTER
----- Message from Morro Blancas sent at 11/10/2020 12:35 PM EST -----  Regarding: Prescription Question  Contact: [phone]  The creon from my endocrinologist office isn’t covered by insurance is there any other prescription you can give me for my Gastroparesis

## 2020-11-10 NOTE — TELEPHONE ENCOUNTER
Creon is not for gastroparesis.  Its for pancreatic insufficiency.  Have the patient make a telephone visit with me and we can discuss the meds that we use for gastroparesis.  Some of them have a lot of really scary possible side effects so we do not just prescribe them without discussing it first.  Thanks

## 2020-12-28 ENCOUNTER — TELEPHONE (OUTPATIENT)
Dept: GASTROENTEROLOGY | Facility: CLINIC | Age: 30
End: 2020-12-28

## 2020-12-28 NOTE — TELEPHONE ENCOUNTER
Ellyn is out of office.    Called pt and call could not be completed.    Message forwarded to Dr Aguayo.

## 2020-12-28 NOTE — TELEPHONE ENCOUNTER
----- Message from Morro Blancas sent at 12/27/2020 10:58 AM EST -----  Regarding: Prescription Question  Contact: [phone]  Is it possible to get a higher mg for my dexilant, I’ve been having to take 2 60mgs to keep my reflux in control

## 2020-12-28 NOTE — TELEPHONE ENCOUNTER
60 mg once a day is the highest dose.  Recommend adding Pepcid OTC as a second dose.  Recommend close adherence to gastroparesis diet as strain from the diet will definitely worsen his acid reflux.

## 2020-12-28 NOTE — TELEPHONE ENCOUNTER
Call to pt.  Advise per Dr Aguayo note.  Verb understanding.     States is Type 1 DM - so eating patterns very difficult.  Advise notify this office pepcid not adequate.  Verb understanding.

## 2020-12-30 ENCOUNTER — OFFICE VISIT (OUTPATIENT)
Dept: FAMILY MEDICINE CLINIC | Facility: CLINIC | Age: 30
End: 2020-12-30

## 2020-12-30 VITALS
TEMPERATURE: 98.4 F | OXYGEN SATURATION: 98 % | HEART RATE: 106 BPM | WEIGHT: 141 LBS | SYSTOLIC BLOOD PRESSURE: 130 MMHG | BODY MASS INDEX: 19.1 KG/M2 | HEIGHT: 72 IN | DIASTOLIC BLOOD PRESSURE: 92 MMHG

## 2020-12-30 DIAGNOSIS — L02.419 CELLULITIS AND ABSCESS OF LEG, EXCEPT FOOT: Primary | ICD-10-CM

## 2020-12-30 DIAGNOSIS — L03.119 CELLULITIS AND ABSCESS OF LEG, EXCEPT FOOT: Primary | ICD-10-CM

## 2020-12-30 PROCEDURE — 99213 OFFICE O/P EST LOW 20 MIN: CPT | Performed by: NURSE PRACTITIONER

## 2020-12-30 RX ORDER — AMOXICILLIN AND CLAVULANATE POTASSIUM 875; 125 MG/1; MG/1
1 TABLET, FILM COATED ORAL 2 TIMES DAILY
Qty: 14 TABLET | Refills: 0 | Status: SHIPPED | OUTPATIENT
Start: 2020-12-30 | End: 2021-01-06

## 2020-12-30 NOTE — PROGRESS NOTES
Subjective   Morro Blancas is a 30 y.o. male.     Chief Complaint   Patient presents with   • Sore     Insulin pump injection site infected     Cyst  This is a new (at site of insulin pump sensor) problem. The current episode started yesterday. The problem occurs constantly. Pertinent negatives include no chills or fever. Associated symptoms comments: Yellow drainage, . Nothing aggravates the symptoms. He has tried nothing for the symptoms.     I have reviewed the patient's medical history in detail and updated the computerized patient record.     The following portions of the patient's history were reviewed and updated as appropriate: allergies, current medications, past family history, past medical history, past social history, past surgical history and problem list.      Current Outpatient Medications:   •  ADMELOG 100 UNIT/ML injection, Insulin pump, Disp: , Rfl:   •  dexlansoprazole (Dexilant) 60 MG capsule, Take one capsule by mouth daily, Disp: 90 capsule, Rfl: 3  •  Pancrelipase, Lip-Prot-Amyl, (CREON PO), Take 1 tablet by mouth Daily., Disp: , Rfl:   •  amoxicillin-clavulanate (Augmentin) 875-125 MG per tablet, Take 1 tablet by mouth 2 (Two) Times a Day for 7 days., Disp: 14 tablet, Rfl: 0     Review of Systems   Constitutional: Negative.  Negative for chills and fever.   Respiratory: Negative.    Cardiovascular: Negative.    Skin: Positive for skin lesions (left inner thigh).   Neurological: Negative.        Objective    Vitals:    12/30/20 0959   BP: 130/92   Pulse: 106   Temp: 98.4 °F (36.9 °C)   SpO2: 98%     Physical Exam  Vitals signs reviewed.   Constitutional:       Appearance: Normal appearance. He is normal weight.   Skin:     General: Skin is warm and dry.      Findings: Lesion (red, swollen, 1mm induration) present.   Neurological:      Mental Status: He is alert and oriented to person, place, and time.   Psychiatric:      Comments: No acute distress           Assessment/Plan   Diagnoses and all  orders for this visit:    1. Cellulitis and abscess of leg, except foot (Primary)  -     amoxicillin-clavulanate (Augmentin) 875-125 MG per tablet; Take 1 tablet by mouth 2 (Two) Times a Day for 7 days.  Dispense: 14 tablet; Refill: 0        Mr. Blancas presents today with cellulitis and an abscess of his inner thigh on his left leg where he was wearing his insulin pump needle. He is to start Augmentin 875-125 mg twice a day for 7 days. He is to keep the area clean and dry.  He is to follow up as needed and in 7 months for an annual physical exam.

## 2021-01-08 RX ORDER — DEXLANSOPRAZOLE 60 MG/1
CAPSULE, DELAYED RELEASE ORAL
Qty: 90 CAPSULE | Refills: 3 | Status: SHIPPED | OUTPATIENT
Start: 2021-01-08 | End: 2021-04-17 | Stop reason: HOSPADM

## 2021-03-20 ENCOUNTER — APPOINTMENT (OUTPATIENT)
Dept: CT IMAGING | Facility: HOSPITAL | Age: 31
End: 2021-03-20

## 2021-03-20 ENCOUNTER — APPOINTMENT (OUTPATIENT)
Dept: GENERAL RADIOLOGY | Facility: HOSPITAL | Age: 31
End: 2021-03-20

## 2021-03-20 ENCOUNTER — HOSPITAL ENCOUNTER (OUTPATIENT)
Facility: HOSPITAL | Age: 31
Setting detail: OBSERVATION
Discharge: HOME | End: 2021-03-22
Attending: EMERGENCY MEDICINE | Admitting: HOSPITALIST

## 2021-03-20 DIAGNOSIS — R93.5 ABNORMAL CT OF THE ABDOMEN: ICD-10-CM

## 2021-03-20 DIAGNOSIS — R73.9 HYPERGLYCEMIA: ICD-10-CM

## 2021-03-20 DIAGNOSIS — R11.2 INTRACTABLE VOMITING WITH NAUSEA, UNSPECIFIED VOMITING TYPE: Primary | ICD-10-CM

## 2021-03-20 DIAGNOSIS — R79.89 ELEVATED LACTIC ACID LEVEL: ICD-10-CM

## 2021-03-20 DIAGNOSIS — D72.825 BANDEMIA: ICD-10-CM

## 2021-03-20 PROBLEM — R11.10 INTRACTABLE VOMITING: Status: ACTIVE | Noted: 2021-03-20

## 2021-03-20 LAB
ALBUMIN SERPL-MCNC: 4.6 G/DL (ref 3.5–5.2)
ALBUMIN/GLOB SERPL: 1.6 G/DL
ALP SERPL-CCNC: 87 U/L (ref 39–117)
ALT SERPL W P-5'-P-CCNC: 18 U/L (ref 1–41)
ANION GAP SERPL CALCULATED.3IONS-SCNC: 15.1 MMOL/L (ref 5–15)
AST SERPL-CCNC: 15 U/L (ref 1–40)
ATMOSPHERIC PRESS: 759.8 MMHG
B-OH-BUTYR SERPL-SCNC: 0.22 MMOL/L (ref 0.02–0.27)
BACTERIA UR QL AUTO: ABNORMAL /HPF
BASE EXCESS BLDV CALC-SCNC: 4.5 MMOL/L (ref -2–2)
BASOPHILS # BLD AUTO: 0.1 10*3/MM3 (ref 0–0.2)
BASOPHILS NFR BLD AUTO: 0.4 % (ref 0–1.5)
BDY SITE: ABNORMAL
BILIRUB SERPL-MCNC: 0.6 MG/DL (ref 0–1.2)
BILIRUB UR QL STRIP: NEGATIVE
BUN SERPL-MCNC: 15 MG/DL (ref 6–20)
BUN/CREAT SERPL: 14.4 (ref 7–25)
CALCIUM SPEC-SCNC: 9.2 MG/DL (ref 8.6–10.5)
CHLORIDE SERPL-SCNC: 100 MMOL/L (ref 98–107)
CLARITY UR: CLEAR
CO2 SERPL-SCNC: 24.9 MMOL/L (ref 22–29)
COLOR UR: YELLOW
CREAT SERPL-MCNC: 1.04 MG/DL (ref 0.76–1.27)
D-LACTATE SERPL-SCNC: 3.2 MMOL/L (ref 0.5–2)
D-LACTATE SERPL-SCNC: 4.3 MMOL/L (ref 0.5–2)
DEPRECATED RDW RBC AUTO: 44.9 FL (ref 37–54)
EOSINOPHIL # BLD AUTO: 0 10*3/MM3 (ref 0–0.4)
EOSINOPHIL NFR BLD AUTO: 0 % (ref 0.3–6.2)
ERYTHROCYTE [DISTWIDTH] IN BLOOD BY AUTOMATED COUNT: 13.9 % (ref 12.3–15.4)
GFR SERPL CREATININE-BSD FRML MDRD: 84 ML/MIN/1.73
GLOBULIN UR ELPH-MCNC: 2.9 GM/DL
GLUCOSE BLDC GLUCOMTR-MCNC: 239 MG/DL (ref 70–130)
GLUCOSE BLDC GLUCOMTR-MCNC: 398 MG/DL (ref 70–130)
GLUCOSE SERPL-MCNC: 236 MG/DL (ref 65–99)
GLUCOSE UR STRIP-MCNC: ABNORMAL MG/DL
HCO3 BLDV-SCNC: 27.6 MMOL/L (ref 22–28)
HCT VFR BLD AUTO: 45.2 % (ref 37.5–51)
HGB BLD-MCNC: 15.3 G/DL (ref 13–17.7)
HGB UR QL STRIP.AUTO: ABNORMAL
HYALINE CASTS UR QL AUTO: ABNORMAL /LPF
IMM GRANULOCYTES # BLD AUTO: 0.18 10*3/MM3 (ref 0–0.05)
IMM GRANULOCYTES NFR BLD AUTO: 0.8 % (ref 0–0.5)
KETONES UR QL STRIP: ABNORMAL
LEUKOCYTE ESTERASE UR QL STRIP.AUTO: NEGATIVE
LIPASE SERPL-CCNC: 12 U/L (ref 13–60)
LYMPHOCYTES # BLD AUTO: 1.79 10*3/MM3 (ref 0.7–3.1)
LYMPHOCYTES NFR BLD AUTO: 7.6 % (ref 19.6–45.3)
MCH RBC QN AUTO: 30.4 PG (ref 26.6–33)
MCHC RBC AUTO-ENTMCNC: 33.8 G/DL (ref 31.5–35.7)
MCV RBC AUTO: 89.7 FL (ref 79–97)
MODALITY: ABNORMAL
MONOCYTES # BLD AUTO: 0.9 10*3/MM3 (ref 0.1–0.9)
MONOCYTES NFR BLD AUTO: 3.8 % (ref 5–12)
NEUTROPHILS NFR BLD AUTO: 20.7 10*3/MM3 (ref 1.7–7)
NEUTROPHILS NFR BLD AUTO: 87.4 % (ref 42.7–76)
NITRITE UR QL STRIP: NEGATIVE
NRBC BLD AUTO-RTO: 0 /100 WBC (ref 0–0.2)
PCO2 BLDV: 35.4 MM HG (ref 41–51)
PH BLDV: 7.5 PH UNITS (ref 7.31–7.41)
PH UR STRIP.AUTO: >=9 [PH] (ref 5–8)
PLATELET # BLD AUTO: 305 10*3/MM3 (ref 140–450)
PMV BLD AUTO: 10.9 FL (ref 6–12)
PO2 BLDV: 34.2 MM HG (ref 35–45)
POTASSIUM SERPL-SCNC: 4.3 MMOL/L (ref 3.5–5.2)
PROCALCITONIN SERPL-MCNC: 0.06 NG/ML (ref 0–0.25)
PROT SERPL-MCNC: 7.5 G/DL (ref 6–8.5)
PROT UR QL STRIP: ABNORMAL
QT INTERVAL: 355 MS
RBC # BLD AUTO: 5.04 10*6/MM3 (ref 4.14–5.8)
RBC # UR: ABNORMAL /HPF
REF LAB TEST METHOD: ABNORMAL
SAO2 % BLDCOA: 72 % (ref 92–99)
SARS-COV-2 RNA RESP QL NAA+PROBE: NOT DETECTED
SODIUM SERPL-SCNC: 140 MMOL/L (ref 136–145)
SP GR UR STRIP: 1.02 (ref 1–1.03)
SQUAMOUS #/AREA URNS HPF: ABNORMAL /HPF
TOTAL RATE: 24 BREATHS/MINUTE
TROPONIN T SERPL-MCNC: <0.01 NG/ML (ref 0–0.03)
UROBILINOGEN UR QL STRIP: ABNORMAL
WBC # BLD AUTO: 23.67 10*3/MM3 (ref 3.4–10.8)
WBC UR QL AUTO: ABNORMAL /HPF

## 2021-03-20 PROCEDURE — 87040 BLOOD CULTURE FOR BACTERIA: CPT | Performed by: EMERGENCY MEDICINE

## 2021-03-20 PROCEDURE — 81001 URINALYSIS AUTO W/SCOPE: CPT | Performed by: EMERGENCY MEDICINE

## 2021-03-20 PROCEDURE — G0378 HOSPITAL OBSERVATION PER HR: HCPCS

## 2021-03-20 PROCEDURE — 82962 GLUCOSE BLOOD TEST: CPT

## 2021-03-20 PROCEDURE — 83690 ASSAY OF LIPASE: CPT | Performed by: EMERGENCY MEDICINE

## 2021-03-20 PROCEDURE — 74177 CT ABD & PELVIS W/CONTRAST: CPT

## 2021-03-20 PROCEDURE — 71045 X-RAY EXAM CHEST 1 VIEW: CPT

## 2021-03-20 PROCEDURE — 96375 TX/PRO/DX INJ NEW DRUG ADDON: CPT

## 2021-03-20 PROCEDURE — 25010000003 CEFTRIAXONE PER 250 MG: Performed by: EMERGENCY MEDICINE

## 2021-03-20 PROCEDURE — 96376 TX/PRO/DX INJ SAME DRUG ADON: CPT

## 2021-03-20 PROCEDURE — 36415 COLL VENOUS BLD VENIPUNCTURE: CPT

## 2021-03-20 PROCEDURE — C9803 HOPD COVID-19 SPEC COLLECT: HCPCS

## 2021-03-20 PROCEDURE — U0003 INFECTIOUS AGENT DETECTION BY NUCLEIC ACID (DNA OR RNA); SEVERE ACUTE RESPIRATORY SYNDROME CORONAVIRUS 2 (SARS-COV-2) (CORONAVIRUS DISEASE [COVID-19]), AMPLIFIED PROBE TECHNIQUE, MAKING USE OF HIGH THROUGHPUT TECHNOLOGIES AS DESCRIBED BY CMS-2020-01-R: HCPCS | Performed by: EMERGENCY MEDICINE

## 2021-03-20 PROCEDURE — 25010000002 ONDANSETRON PER 1 MG: Performed by: EMERGENCY MEDICINE

## 2021-03-20 PROCEDURE — 84484 ASSAY OF TROPONIN QUANT: CPT | Performed by: EMERGENCY MEDICINE

## 2021-03-20 PROCEDURE — 83036 HEMOGLOBIN GLYCOSYLATED A1C: CPT | Performed by: NURSE PRACTITIONER

## 2021-03-20 PROCEDURE — 25010000002 IOPAMIDOL 61 % SOLUTION: Performed by: EMERGENCY MEDICINE

## 2021-03-20 PROCEDURE — 99285 EMERGENCY DEPT VISIT HI MDM: CPT

## 2021-03-20 PROCEDURE — 96365 THER/PROPH/DIAG IV INF INIT: CPT

## 2021-03-20 PROCEDURE — 25010000002 LORAZEPAM PER 2 MG: Performed by: NURSE PRACTITIONER

## 2021-03-20 PROCEDURE — 83605 ASSAY OF LACTIC ACID: CPT | Performed by: EMERGENCY MEDICINE

## 2021-03-20 PROCEDURE — 96361 HYDRATE IV INFUSION ADD-ON: CPT

## 2021-03-20 PROCEDURE — 93005 ELECTROCARDIOGRAM TRACING: CPT | Performed by: EMERGENCY MEDICINE

## 2021-03-20 PROCEDURE — 82077 ASSAY SPEC XCP UR&BREATH IA: CPT | Performed by: NURSE PRACTITIONER

## 2021-03-20 PROCEDURE — 25010000002 ONDANSETRON PER 1 MG: Performed by: NURSE PRACTITIONER

## 2021-03-20 PROCEDURE — 25010000002 HALOPERIDOL LACTATE PER 5 MG: Performed by: EMERGENCY MEDICINE

## 2021-03-20 PROCEDURE — 96367 TX/PROPH/DG ADDL SEQ IV INF: CPT

## 2021-03-20 PROCEDURE — 84145 PROCALCITONIN (PCT): CPT | Performed by: EMERGENCY MEDICINE

## 2021-03-20 PROCEDURE — 85025 COMPLETE CBC W/AUTO DIFF WBC: CPT | Performed by: EMERGENCY MEDICINE

## 2021-03-20 PROCEDURE — 82010 KETONE BODYS QUAN: CPT | Performed by: EMERGENCY MEDICINE

## 2021-03-20 PROCEDURE — 80053 COMPREHEN METABOLIC PANEL: CPT | Performed by: EMERGENCY MEDICINE

## 2021-03-20 PROCEDURE — 93010 ELECTROCARDIOGRAM REPORT: CPT | Performed by: INTERNAL MEDICINE

## 2021-03-20 PROCEDURE — 82803 BLOOD GASES ANY COMBINATION: CPT

## 2021-03-20 PROCEDURE — 83735 ASSAY OF MAGNESIUM: CPT | Performed by: NURSE PRACTITIONER

## 2021-03-20 RX ORDER — KETOROLAC TROMETHAMINE 15 MG/ML
15 INJECTION, SOLUTION INTRAMUSCULAR; INTRAVENOUS ONCE
Status: DISCONTINUED | OUTPATIENT
Start: 2021-03-21 | End: 2021-03-22 | Stop reason: HOSPADM

## 2021-03-20 RX ORDER — ONDANSETRON 4 MG/1
4 TABLET, FILM COATED ORAL EVERY 4 HOURS PRN
Status: DISCONTINUED | OUTPATIENT
Start: 2021-03-20 | End: 2021-03-22 | Stop reason: HOSPADM

## 2021-03-20 RX ORDER — ACETAMINOPHEN 500 MG
1000 TABLET ORAL ONCE
Status: COMPLETED | OUTPATIENT
Start: 2021-03-20 | End: 2021-03-20

## 2021-03-20 RX ORDER — BISACODYL 10 MG
10 SUPPOSITORY, RECTAL RECTAL DAILY PRN
Status: DISCONTINUED | OUTPATIENT
Start: 2021-03-20 | End: 2021-03-22 | Stop reason: HOSPADM

## 2021-03-20 RX ORDER — BISACODYL 5 MG/1
5 TABLET, DELAYED RELEASE ORAL DAILY PRN
Status: DISCONTINUED | OUTPATIENT
Start: 2021-03-20 | End: 2021-03-22 | Stop reason: HOSPADM

## 2021-03-20 RX ORDER — SODIUM CHLORIDE 0.9 % (FLUSH) 0.9 %
10 SYRINGE (ML) INJECTION AS NEEDED
Status: DISCONTINUED | OUTPATIENT
Start: 2021-03-20 | End: 2021-03-22 | Stop reason: HOSPADM

## 2021-03-20 RX ORDER — ONDANSETRON 2 MG/ML
4 INJECTION INTRAMUSCULAR; INTRAVENOUS EVERY 6 HOURS PRN
Status: DISCONTINUED | OUTPATIENT
Start: 2021-03-20 | End: 2021-03-20

## 2021-03-20 RX ORDER — ONDANSETRON 2 MG/ML
4 INJECTION INTRAMUSCULAR; INTRAVENOUS ONCE
Status: COMPLETED | OUTPATIENT
Start: 2021-03-20 | End: 2021-03-20

## 2021-03-20 RX ORDER — FOLIC ACID 1 MG/1
1 TABLET ORAL DAILY
Status: DISCONTINUED | OUTPATIENT
Start: 2021-03-21 | End: 2021-03-22 | Stop reason: HOSPADM

## 2021-03-20 RX ORDER — ACETAMINOPHEN 325 MG/1
650 TABLET ORAL EVERY 4 HOURS PRN
Status: DISCONTINUED | OUTPATIENT
Start: 2021-03-20 | End: 2021-03-22 | Stop reason: HOSPADM

## 2021-03-20 RX ORDER — HALOPERIDOL 5 MG/ML
2 INJECTION INTRAMUSCULAR ONCE
Status: COMPLETED | OUTPATIENT
Start: 2021-03-20 | End: 2021-03-20

## 2021-03-20 RX ORDER — LORAZEPAM 1 MG/1
2 TABLET ORAL
Status: DISCONTINUED | OUTPATIENT
Start: 2021-03-20 | End: 2021-03-22 | Stop reason: HOSPADM

## 2021-03-20 RX ORDER — CEFTRIAXONE SODIUM 2 G/50ML
2 INJECTION, SOLUTION INTRAVENOUS ONCE
Status: COMPLETED | OUTPATIENT
Start: 2021-03-20 | End: 2021-03-20

## 2021-03-20 RX ORDER — INSULIN LISPRO 100 [IU]/ML
0-9 INJECTION, SOLUTION INTRAVENOUS; SUBCUTANEOUS ONCE
Status: COMPLETED | OUTPATIENT
Start: 2021-03-21 | End: 2021-03-21

## 2021-03-20 RX ORDER — LORAZEPAM 2 MG/ML
1 INJECTION INTRAMUSCULAR
Status: DISCONTINUED | OUTPATIENT
Start: 2021-03-20 | End: 2021-03-22 | Stop reason: HOSPADM

## 2021-03-20 RX ORDER — LORAZEPAM 2 MG/ML
2 INJECTION INTRAMUSCULAR
Status: DISCONTINUED | OUTPATIENT
Start: 2021-03-20 | End: 2021-03-22 | Stop reason: HOSPADM

## 2021-03-20 RX ORDER — ONDANSETRON 2 MG/ML
4 INJECTION INTRAMUSCULAR; INTRAVENOUS EVERY 4 HOURS PRN
Status: DISCONTINUED | OUTPATIENT
Start: 2021-03-20 | End: 2021-03-22 | Stop reason: HOSPADM

## 2021-03-20 RX ORDER — ALUMINA, MAGNESIA, AND SIMETHICONE 2400; 2400; 240 MG/30ML; MG/30ML; MG/30ML
15 SUSPENSION ORAL EVERY 6 HOURS PRN
Status: DISCONTINUED | OUTPATIENT
Start: 2021-03-20 | End: 2021-03-22 | Stop reason: HOSPADM

## 2021-03-20 RX ORDER — LORAZEPAM 1 MG/1
1 TABLET ORAL
Status: DISCONTINUED | OUTPATIENT
Start: 2021-03-20 | End: 2021-03-22 | Stop reason: HOSPADM

## 2021-03-20 RX ORDER — DEXTROSE MONOHYDRATE 25 G/50ML
25 INJECTION, SOLUTION INTRAVENOUS
Status: DISCONTINUED | OUTPATIENT
Start: 2021-03-20 | End: 2021-03-22 | Stop reason: HOSPADM

## 2021-03-20 RX ORDER — NITROGLYCERIN 0.4 MG/1
0.4 TABLET SUBLINGUAL
Status: DISCONTINUED | OUTPATIENT
Start: 2021-03-20 | End: 2021-03-22 | Stop reason: HOSPADM

## 2021-03-20 RX ORDER — SODIUM CHLORIDE 9 MG/ML
125 INJECTION, SOLUTION INTRAVENOUS CONTINUOUS
Status: DISCONTINUED | OUTPATIENT
Start: 2021-03-21 | End: 2021-03-22 | Stop reason: HOSPADM

## 2021-03-20 RX ORDER — NICOTINE POLACRILEX 4 MG
15 LOZENGE BUCCAL
Status: DISCONTINUED | OUTPATIENT
Start: 2021-03-20 | End: 2021-03-22 | Stop reason: HOSPADM

## 2021-03-20 RX ORDER — INSULIN LISPRO 100 [IU]/ML
0-9 INJECTION, SOLUTION INTRAVENOUS; SUBCUTANEOUS
Status: DISCONTINUED | OUTPATIENT
Start: 2021-03-21 | End: 2021-03-22 | Stop reason: HOSPADM

## 2021-03-20 RX ORDER — ONDANSETRON 4 MG/1
4 TABLET, FILM COATED ORAL EVERY 6 HOURS PRN
Status: DISCONTINUED | OUTPATIENT
Start: 2021-03-20 | End: 2021-03-20

## 2021-03-20 RX ORDER — DIPHENOXYLATE HYDROCHLORIDE AND ATROPINE SULFATE 2.5; .025 MG/1; MG/1
1 TABLET ORAL DAILY
Status: DISCONTINUED | OUTPATIENT
Start: 2021-03-21 | End: 2021-03-22 | Stop reason: HOSPADM

## 2021-03-20 RX ADMIN — ONDANSETRON 4 MG: 2 INJECTION INTRAMUSCULAR; INTRAVENOUS at 23:45

## 2021-03-20 RX ADMIN — CEFTRIAXONE SODIUM 2 G: 2 INJECTION, SOLUTION INTRAVENOUS at 21:00

## 2021-03-20 RX ADMIN — SODIUM CHLORIDE, POTASSIUM CHLORIDE, SODIUM LACTATE AND CALCIUM CHLORIDE 1000 ML: 600; 310; 30; 20 INJECTION, SOLUTION INTRAVENOUS at 18:32

## 2021-03-20 RX ADMIN — SODIUM CHLORIDE 100 ML/HR: 9 INJECTION, SOLUTION INTRAVENOUS at 23:45

## 2021-03-20 RX ADMIN — HALOPERIDOL LACTATE 2 MG: 5 INJECTION, SOLUTION INTRAMUSCULAR at 19:56

## 2021-03-20 RX ADMIN — METRONIDAZOLE 500 MG: 500 INJECTION, SOLUTION INTRAVENOUS at 22:12

## 2021-03-20 RX ADMIN — SODIUM CHLORIDE, POTASSIUM CHLORIDE, SODIUM LACTATE AND CALCIUM CHLORIDE 1000 ML: 600; 310; 30; 20 INJECTION, SOLUTION INTRAVENOUS at 20:02

## 2021-03-20 RX ADMIN — IOPAMIDOL 85 ML: 612 INJECTION, SOLUTION INTRAVENOUS at 20:30

## 2021-03-20 RX ADMIN — ACETAMINOPHEN 1000 MG: 500 TABLET, FILM COATED ORAL at 21:00

## 2021-03-20 RX ADMIN — ONDANSETRON 4 MG: 2 INJECTION INTRAMUSCULAR; INTRAVENOUS at 18:40

## 2021-03-20 RX ADMIN — LORAZEPAM 1 MG: 2 INJECTION INTRAMUSCULAR; INTRAVENOUS at 23:44

## 2021-03-21 PROBLEM — A41.9 SEPSIS WITHOUT ACUTE ORGAN DYSFUNCTION (HCC): Status: ACTIVE | Noted: 2021-03-21

## 2021-03-21 PROBLEM — Z72.0 TOBACCO ABUSE: Status: ACTIVE | Noted: 2021-03-21

## 2021-03-21 PROBLEM — F10.10 ALCOHOL ABUSE: Status: ACTIVE | Noted: 2021-03-21

## 2021-03-21 LAB
ANION GAP SERPL CALCULATED.3IONS-SCNC: 15.9 MMOL/L (ref 5–15)
BUN SERPL-MCNC: 17 MG/DL (ref 6–20)
BUN/CREAT SERPL: 17 (ref 7–25)
CALCIUM SPEC-SCNC: 8.1 MG/DL (ref 8.6–10.5)
CHLORIDE SERPL-SCNC: 99 MMOL/L (ref 98–107)
CO2 SERPL-SCNC: 20.1 MMOL/L (ref 22–29)
CREAT SERPL-MCNC: 1 MG/DL (ref 0.76–1.27)
D-LACTATE SERPL-SCNC: 1.1 MMOL/L (ref 0.5–2)
D-LACTATE SERPL-SCNC: 2.6 MMOL/L (ref 0.5–2)
DEPRECATED RDW RBC AUTO: 43.7 FL (ref 37–54)
ERYTHROCYTE [DISTWIDTH] IN BLOOD BY AUTOMATED COUNT: 13.3 % (ref 12.3–15.4)
ETHANOL BLD-MCNC: <10 MG/DL (ref 0–10)
ETHANOL UR QL: <0.01 %
GFR SERPL CREATININE-BSD FRML MDRD: 88 ML/MIN/1.73
GLUCOSE BLDC GLUCOMTR-MCNC: 122 MG/DL (ref 70–130)
GLUCOSE BLDC GLUCOMTR-MCNC: 174 MG/DL (ref 70–130)
GLUCOSE BLDC GLUCOMTR-MCNC: 222 MG/DL (ref 70–130)
GLUCOSE BLDC GLUCOMTR-MCNC: 295 MG/DL (ref 70–130)
GLUCOSE SERPL-MCNC: 435 MG/DL (ref 65–99)
HBA1C MFR BLD: 8.1 % (ref 4.8–5.6)
HCT VFR BLD AUTO: 36.6 % (ref 37.5–51)
HGB BLD-MCNC: 12.5 G/DL (ref 13–17.7)
MAGNESIUM SERPL-MCNC: 1.4 MG/DL (ref 1.6–2.6)
MAGNESIUM SERPL-MCNC: 3.5 MG/DL (ref 1.6–2.6)
MCH RBC QN AUTO: 31 PG (ref 26.6–33)
MCHC RBC AUTO-ENTMCNC: 34.2 G/DL (ref 31.5–35.7)
MCV RBC AUTO: 90.8 FL (ref 79–97)
PLATELET # BLD AUTO: 251 10*3/MM3 (ref 140–450)
PMV BLD AUTO: 10.6 FL (ref 6–12)
POTASSIUM SERPL-SCNC: 4.5 MMOL/L (ref 3.5–5.2)
RBC # BLD AUTO: 4.03 10*6/MM3 (ref 4.14–5.8)
SODIUM SERPL-SCNC: 135 MMOL/L (ref 136–145)
WBC # BLD AUTO: 17.14 10*3/MM3 (ref 3.4–10.8)

## 2021-03-21 PROCEDURE — 82962 GLUCOSE BLOOD TEST: CPT

## 2021-03-21 PROCEDURE — 25010000002 THIAMINE PER 100 MG: Performed by: NURSE PRACTITIONER

## 2021-03-21 PROCEDURE — 25010000002 MAGNESIUM SULFATE 2 GM/50ML SOLUTION: Performed by: NURSE PRACTITIONER

## 2021-03-21 PROCEDURE — 25010000002 PIPERACILLIN SOD-TAZOBACTAM PER 1 G: Performed by: NURSE PRACTITIONER

## 2021-03-21 PROCEDURE — 99214 OFFICE O/P EST MOD 30 MIN: CPT | Performed by: INTERNAL MEDICINE

## 2021-03-21 PROCEDURE — 63710000001 INSULIN GLARGINE PER 5 UNITS: Performed by: HOSPITALIST

## 2021-03-21 PROCEDURE — 80048 BASIC METABOLIC PNL TOTAL CA: CPT | Performed by: NURSE PRACTITIONER

## 2021-03-21 PROCEDURE — 85027 COMPLETE CBC AUTOMATED: CPT | Performed by: NURSE PRACTITIONER

## 2021-03-21 PROCEDURE — 36415 COLL VENOUS BLD VENIPUNCTURE: CPT | Performed by: NURSE PRACTITIONER

## 2021-03-21 PROCEDURE — 83735 ASSAY OF MAGNESIUM: CPT | Performed by: HOSPITALIST

## 2021-03-21 PROCEDURE — 63710000001 INSULIN LISPRO (HUMAN) PER 5 UNITS: Performed by: INTERNAL MEDICINE

## 2021-03-21 PROCEDURE — 63710000001 INSULIN LISPRO (HUMAN) PER 5 UNITS: Performed by: NURSE PRACTITIONER

## 2021-03-21 PROCEDURE — 96368 THER/DIAG CONCURRENT INF: CPT

## 2021-03-21 PROCEDURE — 96376 TX/PRO/DX INJ SAME DRUG ADON: CPT

## 2021-03-21 PROCEDURE — 96367 TX/PROPH/DG ADDL SEQ IV INF: CPT

## 2021-03-21 PROCEDURE — 25010000002 LORAZEPAM PER 2 MG: Performed by: NURSE PRACTITIONER

## 2021-03-21 PROCEDURE — 83605 ASSAY OF LACTIC ACID: CPT | Performed by: NURSE PRACTITIONER

## 2021-03-21 PROCEDURE — 96375 TX/PRO/DX INJ NEW DRUG ADDON: CPT

## 2021-03-21 PROCEDURE — 25010000002 METOCLOPRAMIDE PER 10 MG: Performed by: HOSPITALIST

## 2021-03-21 PROCEDURE — G0378 HOSPITAL OBSERVATION PER HR: HCPCS

## 2021-03-21 RX ORDER — METOCLOPRAMIDE HYDROCHLORIDE 5 MG/ML
10 INJECTION INTRAMUSCULAR; INTRAVENOUS EVERY 8 HOURS
Status: DISCONTINUED | OUTPATIENT
Start: 2021-03-21 | End: 2021-03-22 | Stop reason: HOSPADM

## 2021-03-21 RX ORDER — NICOTINE 21 MG/24HR
1 PATCH, TRANSDERMAL 24 HOURS TRANSDERMAL
Status: DISCONTINUED | OUTPATIENT
Start: 2021-03-21 | End: 2021-03-22 | Stop reason: HOSPADM

## 2021-03-21 RX ORDER — MAGNESIUM SULFATE HEPTAHYDRATE 40 MG/ML
2 INJECTION, SOLUTION INTRAVENOUS AS NEEDED
Status: DISCONTINUED | OUTPATIENT
Start: 2021-03-21 | End: 2021-03-22 | Stop reason: HOSPADM

## 2021-03-21 RX ORDER — CEFTRIAXONE SODIUM 2 G/50ML
2 INJECTION, SOLUTION INTRAVENOUS EVERY 24 HOURS
Status: DISCONTINUED | OUTPATIENT
Start: 2021-03-21 | End: 2021-03-21

## 2021-03-21 RX ORDER — MAGNESIUM SULFATE HEPTAHYDRATE 40 MG/ML
4 INJECTION, SOLUTION INTRAVENOUS AS NEEDED
Status: DISCONTINUED | OUTPATIENT
Start: 2021-03-21 | End: 2021-03-22 | Stop reason: HOSPADM

## 2021-03-21 RX ORDER — FAMOTIDINE 10 MG/ML
20 INJECTION, SOLUTION INTRAVENOUS EVERY 12 HOURS SCHEDULED
Status: DISCONTINUED | OUTPATIENT
Start: 2021-03-21 | End: 2021-03-22 | Stop reason: HOSPADM

## 2021-03-21 RX ORDER — INSULIN GLARGINE 100 [IU]/ML
20 INJECTION, SOLUTION SUBCUTANEOUS EVERY MORNING
Status: DISCONTINUED | OUTPATIENT
Start: 2021-03-21 | End: 2021-03-22 | Stop reason: HOSPADM

## 2021-03-21 RX ADMIN — INSULIN LISPRO 8 UNITS: 100 INJECTION, SOLUTION INTRAVENOUS; SUBCUTANEOUS at 00:04

## 2021-03-21 RX ADMIN — Medication 1 PATCH: at 08:34

## 2021-03-21 RX ADMIN — SODIUM CHLORIDE 125 ML/HR: 9 INJECTION, SOLUTION INTRAVENOUS at 17:11

## 2021-03-21 RX ADMIN — FAMOTIDINE 20 MG: 10 INJECTION INTRAVENOUS at 03:54

## 2021-03-21 RX ADMIN — SODIUM CHLORIDE 125 ML/HR: 9 INJECTION, SOLUTION INTRAVENOUS at 08:34

## 2021-03-21 RX ADMIN — FOLIC ACID 1 MG: 1 TABLET ORAL at 08:34

## 2021-03-21 RX ADMIN — FAMOTIDINE 20 MG: 10 INJECTION INTRAVENOUS at 08:34

## 2021-03-21 RX ADMIN — SODIUM CHLORIDE 125 ML/HR: 9 INJECTION, SOLUTION INTRAVENOUS at 23:42

## 2021-03-21 RX ADMIN — LORAZEPAM 1 MG: 2 INJECTION INTRAMUSCULAR; INTRAVENOUS at 06:41

## 2021-03-21 RX ADMIN — INSULIN LISPRO 2 UNITS: 100 INJECTION, SOLUTION INTRAVENOUS; SUBCUTANEOUS at 17:11

## 2021-03-21 RX ADMIN — INSULIN GLARGINE 20 UNITS: 100 INJECTION, SOLUTION SUBCUTANEOUS at 10:18

## 2021-03-21 RX ADMIN — Medication 100 MG: at 08:34

## 2021-03-21 RX ADMIN — MAGNESIUM SULFATE 2 G: 2 INJECTION INTRAVENOUS at 01:51

## 2021-03-21 RX ADMIN — FAMOTIDINE 20 MG: 10 INJECTION INTRAVENOUS at 21:13

## 2021-03-21 RX ADMIN — INSULIN LISPRO 9 UNITS: 100 INJECTION, SOLUTION INTRAVENOUS; SUBCUTANEOUS at 08:34

## 2021-03-21 RX ADMIN — METOCLOPRAMIDE HYDROCHLORIDE 10 MG: 5 INJECTION INTRAMUSCULAR; INTRAVENOUS at 11:42

## 2021-03-21 RX ADMIN — TAZOBACTAM SODIUM AND PIPERACILLIN SODIUM 3.38 G: 375; 3 INJECTION, SOLUTION INTRAVENOUS at 01:26

## 2021-03-21 RX ADMIN — METOCLOPRAMIDE HYDROCHLORIDE 10 MG: 5 INJECTION INTRAMUSCULAR; INTRAVENOUS at 18:17

## 2021-03-21 RX ADMIN — Medication 1 TABLET: at 08:34

## 2021-03-21 RX ADMIN — MAGNESIUM SULFATE 2 G: 2 INJECTION INTRAVENOUS at 05:58

## 2021-03-21 RX ADMIN — THIAMINE HYDROCHLORIDE 100 MG: 100 INJECTION, SOLUTION INTRAMUSCULAR; INTRAVENOUS at 00:35

## 2021-03-21 RX ADMIN — MAGNESIUM SULFATE 2 G: 2 INJECTION INTRAVENOUS at 03:54

## 2021-03-21 RX ADMIN — INSULIN LISPRO 4 UNITS: 100 INJECTION, SOLUTION INTRAVENOUS; SUBCUTANEOUS at 11:42

## 2021-03-22 ENCOUNTER — READMISSION MANAGEMENT (OUTPATIENT)
Dept: CALL CENTER | Facility: HOSPITAL | Age: 31
End: 2021-03-22

## 2021-03-22 VITALS
WEIGHT: 134 LBS | HEIGHT: 70 IN | TEMPERATURE: 98 F | HEART RATE: 107 BPM | OXYGEN SATURATION: 99 % | SYSTOLIC BLOOD PRESSURE: 136 MMHG | DIASTOLIC BLOOD PRESSURE: 98 MMHG | BODY MASS INDEX: 19.18 KG/M2 | RESPIRATION RATE: 18 BRPM

## 2021-03-22 PROBLEM — R11.10 INTRACTABLE VOMITING: Status: RESOLVED | Noted: 2021-03-20 | Resolved: 2021-03-22

## 2021-03-22 LAB
ANION GAP SERPL CALCULATED.3IONS-SCNC: 7.4 MMOL/L (ref 5–15)
BUN SERPL-MCNC: 10 MG/DL (ref 6–20)
BUN/CREAT SERPL: 15.4 (ref 7–25)
CALCIUM SPEC-SCNC: 7.5 MG/DL (ref 8.6–10.5)
CHLORIDE SERPL-SCNC: 107 MMOL/L (ref 98–107)
CO2 SERPL-SCNC: 25.6 MMOL/L (ref 22–29)
CREAT SERPL-MCNC: 0.65 MG/DL (ref 0.76–1.27)
DEPRECATED RDW RBC AUTO: 43.9 FL (ref 37–54)
ERYTHROCYTE [DISTWIDTH] IN BLOOD BY AUTOMATED COUNT: 13.4 % (ref 12.3–15.4)
GFR SERPL CREATININE-BSD FRML MDRD: 144 ML/MIN/1.73
GLUCOSE BLDC GLUCOMTR-MCNC: 147 MG/DL (ref 70–130)
GLUCOSE BLDC GLUCOMTR-MCNC: 337 MG/DL (ref 70–130)
GLUCOSE BLDC GLUCOMTR-MCNC: 371 MG/DL (ref 70–130)
GLUCOSE SERPL-MCNC: 157 MG/DL (ref 65–99)
HCT VFR BLD AUTO: 34.5 % (ref 37.5–51)
HGB BLD-MCNC: 11.5 G/DL (ref 13–17.7)
MCH RBC QN AUTO: 29.8 PG (ref 26.6–33)
MCHC RBC AUTO-ENTMCNC: 33.3 G/DL (ref 31.5–35.7)
MCV RBC AUTO: 89.4 FL (ref 79–97)
PLATELET # BLD AUTO: 265 10*3/MM3 (ref 140–450)
PMV BLD AUTO: 10.5 FL (ref 6–12)
POTASSIUM SERPL-SCNC: 3.9 MMOL/L (ref 3.5–5.2)
RBC # BLD AUTO: 3.86 10*6/MM3 (ref 4.14–5.8)
SODIUM SERPL-SCNC: 140 MMOL/L (ref 136–145)
WBC # BLD AUTO: 11 10*3/MM3 (ref 3.4–10.8)

## 2021-03-22 PROCEDURE — G0378 HOSPITAL OBSERVATION PER HR: HCPCS

## 2021-03-22 PROCEDURE — 99214 OFFICE O/P EST MOD 30 MIN: CPT | Performed by: INTERNAL MEDICINE

## 2021-03-22 PROCEDURE — 82962 GLUCOSE BLOOD TEST: CPT

## 2021-03-22 PROCEDURE — 80048 BASIC METABOLIC PNL TOTAL CA: CPT | Performed by: HOSPITALIST

## 2021-03-22 PROCEDURE — 85027 COMPLETE CBC AUTOMATED: CPT | Performed by: HOSPITALIST

## 2021-03-22 PROCEDURE — 96376 TX/PRO/DX INJ SAME DRUG ADON: CPT

## 2021-03-22 PROCEDURE — 63710000001 INSULIN GLARGINE PER 5 UNITS: Performed by: HOSPITALIST

## 2021-03-22 PROCEDURE — 25010000002 METOCLOPRAMIDE PER 10 MG: Performed by: HOSPITALIST

## 2021-03-22 PROCEDURE — 63710000001 INSULIN LISPRO (HUMAN) PER 5 UNITS: Performed by: NURSE PRACTITIONER

## 2021-03-22 RX ORDER — METOCLOPRAMIDE 5 MG/1
5 TABLET ORAL
Qty: 20 TABLET | Refills: 0 | Status: SHIPPED | OUTPATIENT
Start: 2021-03-22 | End: 2021-04-28 | Stop reason: SDUPTHER

## 2021-03-22 RX ADMIN — Medication 1 TABLET: at 08:21

## 2021-03-22 RX ADMIN — INSULIN GLARGINE 20 UNITS: 100 INJECTION, SOLUTION SUBCUTANEOUS at 08:22

## 2021-03-22 RX ADMIN — FOLIC ACID 1 MG: 1 TABLET ORAL at 08:21

## 2021-03-22 RX ADMIN — METOCLOPRAMIDE HYDROCHLORIDE 10 MG: 5 INJECTION INTRAMUSCULAR; INTRAVENOUS at 11:25

## 2021-03-22 RX ADMIN — INSULIN LISPRO 7 UNITS: 100 INJECTION, SOLUTION INTRAVENOUS; SUBCUTANEOUS at 11:25

## 2021-03-22 RX ADMIN — METOCLOPRAMIDE HYDROCHLORIDE 10 MG: 5 INJECTION INTRAMUSCULAR; INTRAVENOUS at 03:07

## 2021-03-22 RX ADMIN — Medication 100 MG: at 08:21

## 2021-03-22 RX ADMIN — SODIUM CHLORIDE 125 ML/HR: 9 INJECTION, SOLUTION INTRAVENOUS at 07:29

## 2021-03-22 RX ADMIN — FAMOTIDINE 20 MG: 10 INJECTION INTRAVENOUS at 08:21

## 2021-03-23 ENCOUNTER — TELEPHONE (OUTPATIENT)
Dept: FAMILY MEDICINE CLINIC | Facility: CLINIC | Age: 31
End: 2021-03-23

## 2021-03-23 ENCOUNTER — TRANSITIONAL CARE MANAGEMENT TELEPHONE ENCOUNTER (OUTPATIENT)
Dept: CALL CENTER | Facility: HOSPITAL | Age: 31
End: 2021-03-23

## 2021-03-23 NOTE — OUTREACH NOTE
Prep Survey      Responses   [facility] facility patient discharged from?  [place]   Is LACE score < 7 ?  Yes   Emergency Room discharge w/ pulse ox?  No   Eligibility  [facility]   Date of Admission  03/20/21   Date of Discharge  03/22/21   Discharge Disposition  Home or Self Care   Discharge diagnosis  sepsis d/t colitis, T2DM, ETOH abuse   Does the patient have one of the following disease processes/diagnoses(primary or secondary)?  Sepsis   Does the patient have Home health ordered?  No   Is there a DME ordered?  No   Prep survey completed?  Yes          Gabriela Herring RN

## 2021-03-23 NOTE — OUTREACH NOTE
Call Center TCM Note      Responses   [facility] patient discharged from?  [place]   Does the patient have one of the following disease processes/diagnoses(primary or secondary)?  Sepsis   TCM attempt successful?  Yes   Call start time  1453   Call end time  1459   Discharge diagnosis  sepsis d/t colitis, T2DM, ETOH abuse   Meds reviewed with patient/caregiver?  Yes   Is the patient having any side effects they believe may be caused by any medication additions or changes?  No   Does the patient have all medications related to this admission filled (includes all antibiotics, inhalers, nebulizers,steroids,etc.)  Yes   Is the patient taking all medications as directed (includes completed medication regime)?  Yes   Comments regarding appointments  GI appt is within the next 2 week   Does the patient have a primary care provider?   Yes   Comments regarding PCP  Hospital d/c f/u appt is on 3/25/21 at 11:00 am    Does the patient have an appointment with their PCP within 7 days of discharge?  Yes   Has the patient kept scheduled appointments due by today?  N/A   Psychosocial issues?  No   Did the patient receive a copy of their discharge instructions?  Yes   Nursing interventions  Reviewed instructions with patient   What is the patient's perception of their health status since discharge?  Improving   Nursing interventions  Nurse provided patient education   Is the patient/caregiver able to teach back Sepsis?  S - Shivering,fever or very cold, S - Sleepy, difficult to arouse,confused, S - Short of breath   Nursing interventions  Nurse provided patient education   Is patient/caregiver able to teach back steps to recovery at home?  Set small, achievable goals for return to baseline health, Rest and regain strength   Is the patient/caregiver able to teach back signs and symptoms of worsening condition:  Fever, Hyperthermia, Shortness of breath/rapid respiratory rate, Altered mental status(confusion/coma)   If the  patient is a current smoker, are they able to teach back resources for cessation?  Smoking cessation medications, [id number]   Is the patient/caregiver able to teach back the hierarchy of who to call/visit for symptoms/problems? PCP, Specialist, Home health nurse, Urgent Care, ED, 911  Yes   TCM call completed?  Yes          Alta Delgadillo RN    3/23/2021, 14:59 EDT

## 2021-03-23 NOTE — TELEPHONE ENCOUNTER
Gale is requesting to see him in the office for this [facility] f/u.   Patient was agreeable for f/u and scheduled for Monday

## 2021-03-25 LAB
BACTERIA SPEC AEROBE CULT: NORMAL
BACTERIA SPEC AEROBE CULT: NORMAL

## 2021-03-29 ENCOUNTER — OFFICE VISIT (OUTPATIENT)
Dept: FAMILY MEDICINE CLINIC | Facility: CLINIC | Age: 31
End: 2021-03-29

## 2021-03-29 VITALS
SYSTOLIC BLOOD PRESSURE: 128 MMHG | TEMPERATURE: 97.5 F | WEIGHT: 143 LBS | DIASTOLIC BLOOD PRESSURE: 80 MMHG | BODY MASS INDEX: 20.47 KG/M2 | HEART RATE: 99 BPM | HEIGHT: 70 IN | OXYGEN SATURATION: 98 %

## 2021-03-29 DIAGNOSIS — Z09 HOSPITAL DISCHARGE FOLLOW-UP: Primary | ICD-10-CM

## 2021-03-29 DIAGNOSIS — R19.8 GI PROBLEM: ICD-10-CM

## 2021-03-29 PROCEDURE — 99213 OFFICE O/P EST LOW 20 MIN: CPT | Performed by: NURSE PRACTITIONER

## 2021-03-29 RX ORDER — IBUPROFEN 600 MG/1
TABLET ORAL AS NEEDED
COMMUNITY
Start: 2021-01-25

## 2021-03-29 RX ORDER — PROCHLORPERAZINE 25 MG/1
SUPPOSITORY RECTAL
COMMUNITY
Start: 2021-02-12 | End: 2021-05-23

## 2021-03-29 NOTE — PROGRESS NOTES
"Chief Complaint  Transitional Care Management ([facility]), Vomiting, and GI Problem    Subjective          Morro Blancas presents to [facility]  Mr. Blancas presents today to follow up on a recent hospital admission for nausea and vomiting. He was admitted to [facility] on 3/20/21 and was discharged on 3/22/21. He was told to follow up with gastroenterologist concerning his nausea and vomiting. He reports that his nausea and vomiting is resolved. He is to see GI on 4/1/21.       I have reviewed the patient's medical history in detail and updated the computerized patient record.    Current Outpatient Medications:   •  Continuous Blood Gluc Transmit (Dexcom G6 Transmitter) misc, , Disp: , Rfl:   •  dexlansoprazole (Dexilant) 60 MG capsule, Take one capsule by mouth daily, Disp: 90 capsule, Rfl: 3  •  Glucagon, rDNA, (Glucagon Emergency) 1 MG kit, , Disp: , Rfl:   •  insulin lispro (humaLOG) 100 UNIT/ML injection, Inject  under the skin into the appropriate area as directed 3 (Three) Times a Day Before Meals., Disp: , Rfl:   •  metoclopramide (Reglan) 5 MG tablet, Take 1 tablet by mouth 4 (Four) Times a Day Before Meals & at Bedtime As Needed (nausea/vomiting/gatroparesis)., Disp: 20 tablet, Rfl: 0     Objective   Vital Signs:   /80 (BP Location: Right arm, Patient Position: Sitting, Cuff Size: Adult)   Pulse 99   Temp 97.5 °F (36.4 °C) (Infrared)   Ht 177.8 cm (70\")   Wt 64.9 kg (143 lb)   SpO2 98%   BMI 20.52 kg/m²     Physical Exam  Vitals reviewed.   Constitutional:       Appearance: Normal appearance.   Cardiovascular:      Rate and Rhythm: Normal rate and regular rhythm.      Pulses: Normal pulses.      Heart sounds: Normal heart sounds.   Pulmonary:      Effort: Pulmonary effort is normal.      Breath sounds: Normal breath sounds.   Abdominal:      General: Bowel sounds are normal.      Palpations: Abdomen is soft.   Skin:     General: Skin is warm and dry.   Neurological:      " Mental Status: He is alert and oriented to person, place, and time.   Psychiatric:      Comments: No acute distress        Result Review :     Common labs    Common Labsle 3/20/21 3/20/21 3/20/21 3/21/21 3/21/21 3/22/21 3/22/21    1834 1834 2300 0732 0732 0617 0617   Glucose  236 (A)   435 (A)  157 (A)   BUN  15   17  10   Creatinine  1.04   1.00  0.65 (A)   eGFR Non African Am  84   88  144   Sodium  140   135 (A)  140   Potassium  4.3   4.5  3.9   Chloride  100   99  107   Calcium  9.2   8.1 (A)  7.5 (A)   Albumin  4.60        Total Bilirubin  0.6        Alkaline Phosphatase  87        AST (SGOT)  15        ALT (SGPT)  18        WBC 23.67 (A)   17.14 (A)  11.00 (A)    Hemoglobin 15.3   12.5 (A)  11.5 (A)    Hematocrit 45.2   36.6 (A)  34.5 (A)    Platelets 305   251  265    Hemoglobin A1C   8.10 (A)       (A) Abnormal value       Comments are available for some flowsheets but are not being displayed.           Data reviewed: Radiologic studies 3/20/21 and Recent hospitalization notes 3/20/21          Assessment and Plan    Diagnoses and all orders for this visit:    1. Hospital discharge follow-up (Primary)    2. GI problem    Mr. Blancas appears to be doing well today.   I have reviewed and reconciled his medications with him today.  He is to continue all medications as prescribed.   Follow up with GI as scheduled.     Follow Up   Return if symptoms worsen or fail to improve, for Next scheduled follow up.  Patient was given instructions and counseling regarding his condition or for health maintenance advice. Please see specific information pulled into the AVS if appropriate.

## 2021-04-14 ENCOUNTER — HOSPITAL ENCOUNTER (EMERGENCY)
Facility: HOSPITAL | Age: 31
Discharge: HOME | End: 2021-04-14
Attending: EMERGENCY MEDICINE | Admitting: EMERGENCY MEDICINE

## 2021-04-14 VITALS
TEMPERATURE: 96.1 F | HEART RATE: 93 BPM | WEIGHT: 137 LBS | BODY MASS INDEX: 18.56 KG/M2 | HEIGHT: 72 IN | OXYGEN SATURATION: 99 % | DIASTOLIC BLOOD PRESSURE: 81 MMHG | RESPIRATION RATE: 16 BRPM | SYSTOLIC BLOOD PRESSURE: 139 MMHG

## 2021-04-14 DIAGNOSIS — K31.84 DIABETIC GASTROPARESIS ASSOCIATED WITH TYPE 1 DIABETES MELLITUS (HCC): Primary | ICD-10-CM

## 2021-04-14 DIAGNOSIS — E10.43 DIABETIC GASTROPARESIS ASSOCIATED WITH TYPE 1 DIABETES MELLITUS (HCC): Primary | ICD-10-CM

## 2021-04-14 LAB
ALBUMIN SERPL-MCNC: 4.6 G/DL (ref 3.5–5.2)
ALBUMIN/GLOB SERPL: 1.7 G/DL
ALP SERPL-CCNC: 79 U/L (ref 39–117)
ALT SERPL W P-5'-P-CCNC: 20 U/L (ref 1–41)
ANION GAP SERPL CALCULATED.3IONS-SCNC: 11.7 MMOL/L (ref 5–15)
AST SERPL-CCNC: 15 U/L (ref 1–40)
B-OH-BUTYR SERPL-SCNC: 1.03 MMOL/L (ref 0.02–0.27)
BASOPHILS # BLD AUTO: 0.04 10*3/MM3 (ref 0–0.2)
BASOPHILS NFR BLD AUTO: 0.3 % (ref 0–1.5)
BILIRUB SERPL-MCNC: 0.4 MG/DL (ref 0–1.2)
BUN SERPL-MCNC: 13 MG/DL (ref 6–20)
BUN/CREAT SERPL: 15.9 (ref 7–25)
CALCIUM SPEC-SCNC: 9.9 MG/DL (ref 8.6–10.5)
CHLORIDE SERPL-SCNC: 100 MMOL/L (ref 98–107)
CO2 SERPL-SCNC: 27.3 MMOL/L (ref 22–29)
CREAT SERPL-MCNC: 0.82 MG/DL (ref 0.76–1.27)
DEPRECATED RDW RBC AUTO: 43.7 FL (ref 37–54)
EOSINOPHIL # BLD AUTO: 0.04 10*3/MM3 (ref 0–0.4)
EOSINOPHIL NFR BLD AUTO: 0.3 % (ref 0.3–6.2)
ERYTHROCYTE [DISTWIDTH] IN BLOOD BY AUTOMATED COUNT: 13.4 % (ref 12.3–15.4)
ETHANOL BLD-MCNC: <10 MG/DL (ref 0–10)
ETHANOL UR QL: <0.01 %
GFR SERPL CREATININE-BSD FRML MDRD: 110 ML/MIN/1.73
GLOBULIN UR ELPH-MCNC: 2.7 GM/DL
GLUCOSE SERPL-MCNC: 214 MG/DL (ref 65–99)
HCT VFR BLD AUTO: 41.9 % (ref 37.5–51)
HGB BLD-MCNC: 15 G/DL (ref 13–17.7)
IMM GRANULOCYTES # BLD AUTO: 0.07 10*3/MM3 (ref 0–0.05)
IMM GRANULOCYTES NFR BLD AUTO: 0.5 % (ref 0–0.5)
LIPASE SERPL-CCNC: 23 U/L (ref 13–60)
LYMPHOCYTES # BLD AUTO: 1.76 10*3/MM3 (ref 0.7–3.1)
LYMPHOCYTES NFR BLD AUTO: 12.3 % (ref 19.6–45.3)
MAGNESIUM SERPL-MCNC: 1.8 MG/DL (ref 1.6–2.6)
MCH RBC QN AUTO: 32.3 PG (ref 26.6–33)
MCHC RBC AUTO-ENTMCNC: 35.8 G/DL (ref 31.5–35.7)
MCV RBC AUTO: 90.3 FL (ref 79–97)
MONOCYTES # BLD AUTO: 0.54 10*3/MM3 (ref 0.1–0.9)
MONOCYTES NFR BLD AUTO: 3.8 % (ref 5–12)
NEUTROPHILS NFR BLD AUTO: 11.83 10*3/MM3 (ref 1.7–7)
NEUTROPHILS NFR BLD AUTO: 82.8 % (ref 42.7–76)
NRBC BLD AUTO-RTO: 0 /100 WBC (ref 0–0.2)
PLATELET # BLD AUTO: 285 10*3/MM3 (ref 140–450)
PMV BLD AUTO: 10.9 FL (ref 6–12)
POTASSIUM SERPL-SCNC: 3.8 MMOL/L (ref 3.5–5.2)
PROT SERPL-MCNC: 7.3 G/DL (ref 6–8.5)
RBC # BLD AUTO: 4.64 10*6/MM3 (ref 4.14–5.8)
SODIUM SERPL-SCNC: 139 MMOL/L (ref 136–145)
WBC # BLD AUTO: 14.28 10*3/MM3 (ref 3.4–10.8)

## 2021-04-14 PROCEDURE — 25010000002 DROPERIDOL PER 5 MG: Performed by: EMERGENCY MEDICINE

## 2021-04-14 PROCEDURE — 99284 EMERGENCY DEPT VISIT MOD MDM: CPT

## 2021-04-14 PROCEDURE — 80053 COMPREHEN METABOLIC PANEL: CPT | Performed by: EMERGENCY MEDICINE

## 2021-04-14 PROCEDURE — 96375 TX/PRO/DX INJ NEW DRUG ADDON: CPT

## 2021-04-14 PROCEDURE — 82077 ASSAY SPEC XCP UR&BREATH IA: CPT | Performed by: EMERGENCY MEDICINE

## 2021-04-14 PROCEDURE — 83690 ASSAY OF LIPASE: CPT | Performed by: EMERGENCY MEDICINE

## 2021-04-14 PROCEDURE — 85025 COMPLETE CBC W/AUTO DIFF WBC: CPT | Performed by: EMERGENCY MEDICINE

## 2021-04-14 PROCEDURE — 96374 THER/PROPH/DIAG INJ IV PUSH: CPT

## 2021-04-14 PROCEDURE — 82010 KETONE BODYS QUAN: CPT | Performed by: EMERGENCY MEDICINE

## 2021-04-14 PROCEDURE — 83735 ASSAY OF MAGNESIUM: CPT | Performed by: EMERGENCY MEDICINE

## 2021-04-14 PROCEDURE — 25010000002 HYDROMORPHONE PER 4 MG: Performed by: EMERGENCY MEDICINE

## 2021-04-14 RX ORDER — PROMETHAZINE HYDROCHLORIDE 25 MG/1
25 SUPPOSITORY RECTAL EVERY 6 HOURS PRN
Qty: 20 SUPPOSITORY | Refills: 0 | Status: SHIPPED | OUTPATIENT
Start: 2021-04-14

## 2021-04-14 RX ORDER — HYDROMORPHONE HYDROCHLORIDE 1 MG/ML
0.5 INJECTION, SOLUTION INTRAMUSCULAR; INTRAVENOUS; SUBCUTANEOUS ONCE
Status: COMPLETED | OUTPATIENT
Start: 2021-04-14 | End: 2021-04-14

## 2021-04-14 RX ORDER — SODIUM CHLORIDE 0.9 % (FLUSH) 0.9 %
10 SYRINGE (ML) INJECTION AS NEEDED
Status: DISCONTINUED | OUTPATIENT
Start: 2021-04-14 | End: 2021-04-14 | Stop reason: HOSPADM

## 2021-04-14 RX ORDER — DROPERIDOL 2.5 MG/ML
2.5 INJECTION, SOLUTION INTRAMUSCULAR; INTRAVENOUS ONCE
Status: COMPLETED | OUTPATIENT
Start: 2021-04-14 | End: 2021-04-14

## 2021-04-14 RX ADMIN — HYDROMORPHONE HYDROCHLORIDE 0.5 MG: 1 INJECTION, SOLUTION INTRAMUSCULAR; INTRAVENOUS; SUBCUTANEOUS at 18:33

## 2021-04-14 RX ADMIN — DROPERIDOL 2.5 MG: 2.5 INJECTION, SOLUTION INTRAMUSCULAR; INTRAVENOUS at 18:32

## 2021-04-14 RX ADMIN — SODIUM CHLORIDE 1000 ML: 9 INJECTION, SOLUTION INTRAVENOUS at 18:32

## 2021-04-14 NOTE — ED NOTES
Pt to this ED from home c/o acute onset N/V beginning today approx 1130AM; reports vomiting several times with production of dark red/brown emesis.  Pt reports generalized weakness, mild generalized abdominal pain; denies dizziness, fever, diarrhea; reports last BM this morning.  Pt reports DM1 and gastroparesis; portable glucose monitor currently reads 195.     Alex Do, RN  04/14/21 4581

## 2021-04-14 NOTE — ED PROVIDER NOTES
EMERGENCY DEPARTMENT ENCOUNTER    Room Number:  34/34  Date of encounter:  4/14/2021  PCP: Gale Banegas APRN  Historian: Patient      HPI:  Chief Complaint: Abdominal pain, nausea and vomiting  A complete HPI/ROS/PMH/PSH/SH/FH are unobtainable due to: N/A    Context: Morro Blancas is a 30 y.o. male who presents to the ED c/o generalized abdominal pain associated with nausea and vomiting which started today. He reports no inciting events. Abdominal pain is generalized in nature, severe, sharp and crampy. There are no aggravating or alleviating factors. He has had no diarrhea. He has noticed no blood in his emesis or stool. No fevers or chills. No cough, congestion or chest pain. He states his sugars have been a little bit high the past few days. Last alcohol intake was Saturday, he states that he smokes marijuana usually twice daily.      The patient was placed in a mask in triage, hand hygiene was performed before and after my interaction with the patient.  I wore a mask, safety glasses and gloves during my entire interaction with the patient.    PAST MEDICAL HISTORY  Active Ambulatory Problems     Diagnosis Date Noted   • Type 1 diabetes mellitus with hyperglycemia (CMS/HCC) 06/15/2017   • Fecal incontinence 06/15/2017   • Erectile dysfunction 06/15/2017   • Anxiety and depression 06/15/2017   • Hyperplastic colonic polyp 11/04/2020   • Gastroparesis 11/04/2020   • Family history of colorectal cancer 11/04/2020   • Sepsis without acute organ dysfunction (CMS/HCC) 03/21/2021   • Alcohol abuse 03/21/2021   • Tobacco abuse 03/21/2021     Resolved Ambulatory Problems     Diagnosis Date Noted   • Intractable vomiting 03/20/2021     Past Medical History:   Diagnosis Date   • Anxiety    • Colon polyp 8/30/2017   • Depression    • Diabetes type 1, uncontrolled (CMS/HCC)    • Dyspepsia    • Family hx of colon cancer    • GERD (gastroesophageal reflux disease)          PAST SURGICAL HISTORY  Past Surgical History:    Procedure Laterality Date   • COLONOSCOPY N/A 8/30/2017    One 3 mm polyp in the sigmoid colon, Granularity in the terminal ileum         FAMILY HISTORY  Family History   Problem Relation Age of Onset   • Cancer Mother    • Hypertension Father    • Depression Paternal Uncle    • Hypertension Maternal Grandmother    • Irritable bowel syndrome Maternal Grandmother    • Colon cancer Maternal Grandmother    • Hypertension Maternal Grandfather    • Alcohol abuse Maternal Grandfather    • Hypertension Paternal Grandmother    • Cancer Paternal Grandmother    • COPD Paternal Grandmother    • Depression Paternal Grandmother    • Heart disease Paternal Grandfather    • Hypertension Paternal Grandfather          SOCIAL HISTORY  Social History     Socioeconomic History   • Marital status: Significant Other     Spouse name: Not on file   • Number of children: Not on file   • Years of education: Not on file   • Highest education level: Not on file   Tobacco Use   • Smoking status: Current Every Day Smoker     Packs/day: 0.50     Types: Cigarettes     Start date: 2005   • Smokeless tobacco: Never Used   Vaping Use   • Vaping Use: Never used   Substance and Sexual Activity   • Alcohol use: Yes     Comment: DAILY, 1/2 GALLON   • Drug use: Yes     Frequency: 7.0 times per week     Types: Marijuana     Comment: DAILY   • Sexual activity: Defer         ALLERGIES  Patient has no known allergies.        REVIEW OF SYSTEMS  Review of Systems   Constitutional: Negative for chills and fever.   Respiratory: Negative for shortness of breath.    Cardiovascular: Negative for chest pain.   Gastrointestinal: Positive for abdominal pain, nausea and vomiting.   Genitourinary: Negative for dysuria and hematuria.        All systems reviewed and negative except for those discussed in HPI.       PHYSICAL EXAM    I have reviewed the triage vital signs and nursing notes.    ED Triage Vitals   Temp Heart Rate Resp BP SpO2   04/14/21 1800 04/14/21 1800  04/14/21 1800 04/14/21 1809 04/14/21 1800   96.1 °F (35.6 °C) 95 24 (!) 156/104 100 %      Temp src Heart Rate Source Patient Position BP Location FiO2 (%)   -- -- 04/14/21 1809 04/14/21 1809 --     Lying Right arm        Physical Exam   Constitutional: Pt. is awake and alert. He is chronically ill-appearing.  HENT: Normocephalic and atraumatic, oropharynx is dry.  Neck: Normal range of motion. Neck supple. No JVD present. No tracheal deviation present. No thyromegaly present.   Cardiovascular: Tachycardic rate, regular rhythm and normal heart sounds. Exam reveals no gallop and no friction rub.   No murmur heard.  Pulmonary/Chest: Effort normal and breath sounds normal. No stridor. No respiratory distress. No wheezes, no rales.   Abdominal: Sounds are normal. He has generalized tenderness without guarding or rebound.  Musculoskeletal: Normal range of motion. No edema, tenderness or deformity.   Neurological: Pt. is alert and oriented to person, place, and time. He has no focal neurologic deficits..   Skin: Skin is warm and dry. No rash noted. Pt. is not diaphoretic. No erythema.     Nursing note and vitals reviewed.        LAB RESULTS  Recent Results (from the past 24 hour(s))   Comprehensive Metabolic Panel    Collection Time: 04/14/21  6:31 PM    Specimen: Blood   Result Value Ref Range    Glucose 214 (H) 65 - 99 mg/dL    BUN 13 6 - 20 mg/dL    Creatinine 0.82 0.76 - 1.27 mg/dL    Sodium 139 136 - 145 mmol/L    Potassium 3.8 3.5 - 5.2 mmol/L    Chloride 100 98 - 107 mmol/L    CO2 27.3 22.0 - 29.0 mmol/L    Calcium 9.9 8.6 - 10.5 mg/dL    Total Protein 7.3 6.0 - 8.5 g/dL    Albumin 4.60 3.50 - 5.20 g/dL    ALT (SGPT) 20 1 - 41 U/L    AST (SGOT) 15 1 - 40 U/L    Alkaline Phosphatase 79 39 - 117 U/L    Total Bilirubin 0.4 0.0 - 1.2 mg/dL    eGFR Non African Amer 110 >60 mL/min/1.73    Globulin 2.7 gm/dL    A/G Ratio 1.7 g/dL    BUN/Creatinine Ratio 15.9 7.0 - 25.0    Anion Gap 11.7 5.0 - 15.0 mmol/L   Lipase     Collection Time: 04/14/21  6:31 PM    Specimen: Blood   Result Value Ref Range    Lipase 23 13 - 60 U/L   CBC Auto Differential    Collection Time: 04/14/21  6:31 PM    Specimen: Blood   Result Value Ref Range    WBC 14.28 (H) 3.40 - 10.80 10*3/mm3    RBC 4.64 4.14 - 5.80 10*6/mm3    Hemoglobin 15.0 13.0 - 17.7 g/dL    Hematocrit 41.9 37.5 - 51.0 %    MCV 90.3 79.0 - 97.0 fL    MCH 32.3 26.6 - 33.0 pg    MCHC 35.8 (H) 31.5 - 35.7 g/dL    RDW 13.4 12.3 - 15.4 %    RDW-SD 43.7 37.0 - 54.0 fl    MPV 10.9 6.0 - 12.0 fL    Platelets 285 140 - 450 10*3/mm3    Neutrophil % 82.8 (H) 42.7 - 76.0 %    Lymphocyte % 12.3 (L) 19.6 - 45.3 %    Monocyte % 3.8 (L) 5.0 - 12.0 %    Eosinophil % 0.3 0.3 - 6.2 %    Basophil % 0.3 0.0 - 1.5 %    Immature Grans % 0.5 0.0 - 0.5 %    Neutrophils, Absolute 11.83 (H) 1.70 - 7.00 10*3/mm3    Lymphocytes, Absolute 1.76 0.70 - 3.10 10*3/mm3    Monocytes, Absolute 0.54 0.10 - 0.90 10*3/mm3    Eosinophils, Absolute 0.04 0.00 - 0.40 10*3/mm3    Basophils, Absolute 0.04 0.00 - 0.20 10*3/mm3    Immature Grans, Absolute 0.07 (H) 0.00 - 0.05 10*3/mm3    nRBC 0.0 0.0 - 0.2 /100 WBC   Magnesium    Collection Time: 04/14/21  6:31 PM    Specimen: Blood   Result Value Ref Range    Magnesium 1.8 1.6 - 2.6 mg/dL   Ethanol    Collection Time: 04/14/21  6:31 PM    Specimen: Blood   Result Value Ref Range    Ethanol <10 0 - 10 mg/dL    Ethanol % <0.010 %   Beta Hydroxybutyrate Quantitative    Collection Time: 04/14/21  6:31 PM    Specimen: Blood   Result Value Ref Range    Beta-Hydroxybutyrate Quant 1.032 (H) 0.020 - 0.270 mmol/L       Ordered the above labs and independently reviewed the results.        RADIOLOGY  No Radiology Exams Resulted Within Past 24 Hours    I ordered the above noted radiological studies. Reviewed by me and discussed with radiologist.  See dictation for official radiology interpretation.      PROCEDURES    Procedures      MEDICATIONS GIVEN IN ER    Medications   sodium chloride 0.9 %  flush 10 mL (has no administration in time range)   sodium chloride 0.9 % bolus 1,000 mL (0 mL Intravenous Stopped 4/14/21 1930)   droperidol (INAPSINE) injection 2.5 mg (2.5 mg Intravenous Given 4/14/21 1832)   HYDROmorphone (DILAUDID) injection 0.5 mg (0.5 mg Intravenous Given 4/14/21 1833)         PROGRESS, DATA ANALYSIS, CONSULTS, AND MEDICAL DECISION MAKING    Any/all labs have been independently reviewed by me.  Any/all radiology studies have been reviewed by me and discussed with radiologist dictating the report.   EKG's independently viewed and interpreted by me.  Discussion below represents my analysis of pertinent findings related to patient's condition, differential diagnosis, treatment plan and final disposition.      ED Course as of Apr 14 2030 Wed Apr 14, 2021   1809 Prior record review-the patient was admitted from March 20 through March 22 for gastroparesis complicated by alcohol abuse and type 1 diabetes.  He did well with Reglan.    [WC]   1851 CBC is remarkable for leukocytosis.    [WC]   2027 Patient states he feels much better.  His abdominal exam is improved-he looks much better as well.  CMP and the remainder of his labs of been reviewed-beta hydroxybutyrate slightly elevated, but labs are otherwise unremarkable.  We had a talk about his marijuana use and he seemed receptive to the idea of stopping for a month to see if it has any effect with his gastroparesis.    [WC]   2029 Discussed all lab and/or imaging with the patient.  The patient's abdominal exam has improved.  I explained that the patient should return to the emergency department should the pain recur or for any new symptoms (fever, blood in emesis/stool).  I also advised the patient to follow up with their PMD for further evaluation.  There is nothing on workup to suggest appendicitis, diverticulitis, cholecystitis, pancreatitis, peritonitis, mesenteric ischemia, ovarian/testicular torsion, ureteral stones or any other emergent  intra-abdominal process.  My clinical suspicion for serious intra-abdominal pathology is low, and the patient can be safely discharged home for outpatient follow up.        [WC]      ED Course User Index  [WC] Morro Kwan MD       AS OF 20:30 EDT VITALS:    BP - 144/84  HR - 95  TEMP - 96.1 °F (35.6 °C)  02 SATS - 100%        DIAGNOSIS  Final diagnoses:   Diabetic gastroparesis associated with type 1 diabetes mellitus (CMS/HCC)         DISPOSITION  Discharged           Morro Kwan MD  04/14/21 2030

## 2021-04-15 ENCOUNTER — ANESTHESIA (OUTPATIENT)
Dept: GASTROENTEROLOGY | Facility: HOSPITAL | Age: 31
End: 2021-04-15

## 2021-04-15 ENCOUNTER — HOSPITAL ENCOUNTER (INPATIENT)
Facility: HOSPITAL | Age: 31
LOS: 2 days | Discharge: HOME | End: 2021-04-17
Attending: EMERGENCY MEDICINE | Admitting: HOSPITALIST

## 2021-04-15 ENCOUNTER — ANESTHESIA EVENT (OUTPATIENT)
Dept: GASTROENTEROLOGY | Facility: HOSPITAL | Age: 31
End: 2021-04-15

## 2021-04-15 DIAGNOSIS — R11.2 NON-INTRACTABLE VOMITING WITH NAUSEA, UNSPECIFIED VOMITING TYPE: ICD-10-CM

## 2021-04-15 DIAGNOSIS — K31.84 DIABETIC GASTROPARESIS ASSOCIATED WITH TYPE 1 DIABETES MELLITUS (HCC): ICD-10-CM

## 2021-04-15 DIAGNOSIS — K92.2 ACUTE UPPER GI BLEED: Primary | ICD-10-CM

## 2021-04-15 DIAGNOSIS — E10.43 DIABETIC GASTROPARESIS ASSOCIATED WITH TYPE 1 DIABETES MELLITUS (HCC): ICD-10-CM

## 2021-04-15 LAB
ABO GROUP BLD: NORMAL
ALBUMIN SERPL-MCNC: 4.5 G/DL (ref 3.5–5.2)
ALBUMIN/GLOB SERPL: 1.6 G/DL
ALP SERPL-CCNC: 77 U/L (ref 39–117)
ALT SERPL W P-5'-P-CCNC: 17 U/L (ref 1–41)
ANION GAP SERPL CALCULATED.3IONS-SCNC: 14.7 MMOL/L (ref 5–15)
APTT PPP: 22.1 SECONDS (ref 22.7–35.4)
AST SERPL-CCNC: 15 U/L (ref 1–40)
BASOPHILS # BLD AUTO: 0.04 10*3/MM3 (ref 0–0.2)
BASOPHILS NFR BLD AUTO: 0.3 % (ref 0–1.5)
BILIRUB SERPL-MCNC: 0.7 MG/DL (ref 0–1.2)
BLD GP AB SCN SERPL QL: NEGATIVE
BUN SERPL-MCNC: 12 MG/DL (ref 6–20)
BUN/CREAT SERPL: 15.8 (ref 7–25)
CALCIUM SPEC-SCNC: 9.7 MG/DL (ref 8.6–10.5)
CHLORIDE SERPL-SCNC: 101 MMOL/L (ref 98–107)
CO2 SERPL-SCNC: 25.3 MMOL/L (ref 22–29)
CREAT SERPL-MCNC: 0.76 MG/DL (ref 0.76–1.27)
DEPRECATED RDW RBC AUTO: 44.7 FL (ref 37–54)
EOSINOPHIL # BLD AUTO: 0.01 10*3/MM3 (ref 0–0.4)
EOSINOPHIL NFR BLD AUTO: 0.1 % (ref 0.3–6.2)
ERYTHROCYTE [DISTWIDTH] IN BLOOD BY AUTOMATED COUNT: 13.4 % (ref 12.3–15.4)
GFR SERPL CREATININE-BSD FRML MDRD: 120 ML/MIN/1.73
GIE STN SPEC: NORMAL
GLOBULIN UR ELPH-MCNC: 2.8 GM/DL
GLUCOSE BLDC GLUCOMTR-MCNC: 119 MG/DL (ref 70–130)
GLUCOSE BLDC GLUCOMTR-MCNC: 161 MG/DL (ref 70–130)
GLUCOSE BLDC GLUCOMTR-MCNC: 169 MG/DL (ref 70–130)
GLUCOSE SERPL-MCNC: 207 MG/DL (ref 65–99)
HCT VFR BLD AUTO: 36.2 % (ref 37.5–51)
HCT VFR BLD AUTO: 38.4 % (ref 37.5–51)
HCT VFR BLD AUTO: 40.2 % (ref 37.5–51)
HGB BLD-MCNC: 12.8 G/DL (ref 13–17.7)
HGB BLD-MCNC: 12.9 G/DL (ref 13–17.7)
HGB BLD-MCNC: 14.2 G/DL (ref 13–17.7)
IMM GRANULOCYTES # BLD AUTO: 0.04 10*3/MM3 (ref 0–0.05)
IMM GRANULOCYTES NFR BLD AUTO: 0.3 % (ref 0–0.5)
INR PPP: 0.93 (ref 0.9–1.1)
LYMPHOCYTES # BLD AUTO: 2.28 10*3/MM3 (ref 0.7–3.1)
LYMPHOCYTES NFR BLD AUTO: 16.5 % (ref 19.6–45.3)
MCH RBC QN AUTO: 31.9 PG (ref 26.6–33)
MCHC RBC AUTO-ENTMCNC: 35.3 G/DL (ref 31.5–35.7)
MCV RBC AUTO: 90.3 FL (ref 79–97)
MONOCYTES # BLD AUTO: 0.71 10*3/MM3 (ref 0.1–0.9)
MONOCYTES NFR BLD AUTO: 5.1 % (ref 5–12)
NEUTROPHILS NFR BLD AUTO: 10.75 10*3/MM3 (ref 1.7–7)
NEUTROPHILS NFR BLD AUTO: 77.7 % (ref 42.7–76)
NRBC BLD AUTO-RTO: 0 /100 WBC (ref 0–0.2)
PLATELET # BLD AUTO: 301 10*3/MM3 (ref 140–450)
PMV BLD AUTO: 10.8 FL (ref 6–12)
POTASSIUM SERPL-SCNC: 3.8 MMOL/L (ref 3.5–5.2)
PROT SERPL-MCNC: 7.3 G/DL (ref 6–8.5)
PROTHROMBIN TIME: 12.3 SECONDS (ref 11.7–14.2)
RBC # BLD AUTO: 4.45 10*6/MM3 (ref 4.14–5.8)
RH BLD: NEGATIVE
SARS-COV-2 ORF1AB RESP QL NAA+PROBE: NOT DETECTED
SODIUM SERPL-SCNC: 141 MMOL/L (ref 136–145)
T&S EXPIRATION DATE: NORMAL
WBC # BLD AUTO: 13.83 10*3/MM3 (ref 3.4–10.8)

## 2021-04-15 PROCEDURE — 87102 FUNGUS ISOLATION CULTURE: CPT | Performed by: INTERNAL MEDICINE

## 2021-04-15 PROCEDURE — 82962 GLUCOSE BLOOD TEST: CPT

## 2021-04-15 PROCEDURE — 85610 PROTHROMBIN TIME: CPT | Performed by: EMERGENCY MEDICINE

## 2021-04-15 PROCEDURE — 25010000002 PROPOFOL 10 MG/ML EMULSION: Performed by: ANESTHESIOLOGY

## 2021-04-15 PROCEDURE — 43239 EGD BIOPSY SINGLE/MULTIPLE: CPT | Performed by: INTERNAL MEDICINE

## 2021-04-15 PROCEDURE — 99254 IP/OBS CNSLTJ NEW/EST MOD 60: CPT | Performed by: INTERNAL MEDICINE

## 2021-04-15 PROCEDURE — 85014 HEMATOCRIT: CPT | Performed by: NURSE PRACTITIONER

## 2021-04-15 PROCEDURE — U0004 COV-19 TEST NON-CDC HGH THRU: HCPCS | Performed by: EMERGENCY MEDICINE

## 2021-04-15 PROCEDURE — 80053 COMPREHEN METABOLIC PANEL: CPT | Performed by: EMERGENCY MEDICINE

## 2021-04-15 PROCEDURE — 86900 BLOOD TYPING SEROLOGIC ABO: CPT | Performed by: EMERGENCY MEDICINE

## 2021-04-15 PROCEDURE — 36415 COLL VENOUS BLD VENIPUNCTURE: CPT | Performed by: NURSE PRACTITIONER

## 2021-04-15 PROCEDURE — 0DB98ZX EXCISION OF DUODENUM, VIA NATURAL OR ARTIFICIAL OPENING ENDOSCOPIC, DIAGNOSTIC: ICD-10-PCS | Performed by: INTERNAL MEDICINE

## 2021-04-15 PROCEDURE — 85018 HEMOGLOBIN: CPT | Performed by: NURSE PRACTITIONER

## 2021-04-15 PROCEDURE — 86850 RBC ANTIBODY SCREEN: CPT | Performed by: EMERGENCY MEDICINE

## 2021-04-15 PROCEDURE — 85025 COMPLETE CBC W/AUTO DIFF WBC: CPT | Performed by: EMERGENCY MEDICINE

## 2021-04-15 PROCEDURE — 25010000002 ONDANSETRON PER 1 MG: Performed by: NURSE PRACTITIONER

## 2021-04-15 PROCEDURE — 25010000002 LORAZEPAM PER 2 MG: Performed by: EMERGENCY MEDICINE

## 2021-04-15 PROCEDURE — 25010000002 METOCLOPRAMIDE PER 10 MG: Performed by: EMERGENCY MEDICINE

## 2021-04-15 PROCEDURE — 25010000002 HYDROMORPHONE PER 4 MG: Performed by: EMERGENCY MEDICINE

## 2021-04-15 PROCEDURE — 87081 CULTURE SCREEN ONLY: CPT | Performed by: INTERNAL MEDICINE

## 2021-04-15 PROCEDURE — 85730 THROMBOPLASTIN TIME PARTIAL: CPT | Performed by: EMERGENCY MEDICINE

## 2021-04-15 PROCEDURE — 99285 EMERGENCY DEPT VISIT HI MDM: CPT

## 2021-04-15 PROCEDURE — 0DB68ZX EXCISION OF STOMACH, VIA NATURAL OR ARTIFICIAL OPENING ENDOSCOPIC, DIAGNOSTIC: ICD-10-PCS | Performed by: INTERNAL MEDICINE

## 2021-04-15 PROCEDURE — 86901 BLOOD TYPING SEROLOGIC RH(D): CPT | Performed by: EMERGENCY MEDICINE

## 2021-04-15 PROCEDURE — 25010000002 HYDROMORPHONE PER 4 MG: Performed by: HOSPITALIST

## 2021-04-15 PROCEDURE — 88305 TISSUE EXAM BY PATHOLOGIST: CPT | Performed by: INTERNAL MEDICINE

## 2021-04-15 PROCEDURE — 87206 SMEAR FLUORESCENT/ACID STAI: CPT | Performed by: INTERNAL MEDICINE

## 2021-04-15 PROCEDURE — 25010000002 METOCLOPRAMIDE PER 10 MG: Performed by: INTERNAL MEDICINE

## 2021-04-15 PROCEDURE — 25010000002 ONDANSETRON PER 1 MG: Performed by: EMERGENCY MEDICINE

## 2021-04-15 RX ORDER — PANTOPRAZOLE SODIUM 40 MG/10ML
80 INJECTION, POWDER, LYOPHILIZED, FOR SOLUTION INTRAVENOUS ONCE
Status: COMPLETED | OUTPATIENT
Start: 2021-04-15 | End: 2021-04-15

## 2021-04-15 RX ORDER — LORAZEPAM 2 MG/ML
0.5 INJECTION INTRAMUSCULAR ONCE
Status: COMPLETED | OUTPATIENT
Start: 2021-04-15 | End: 2021-04-15

## 2021-04-15 RX ORDER — PROPOFOL 10 MG/ML
VIAL (ML) INTRAVENOUS AS NEEDED
Status: DISCONTINUED | OUTPATIENT
Start: 2021-04-15 | End: 2021-04-15 | Stop reason: SURG

## 2021-04-15 RX ORDER — PANTOPRAZOLE SODIUM 40 MG/1
40 TABLET, DELAYED RELEASE ORAL
Status: DISCONTINUED | OUTPATIENT
Start: 2021-04-16 | End: 2021-04-16

## 2021-04-15 RX ORDER — DEXTROSE MONOHYDRATE 25 G/50ML
25 INJECTION, SOLUTION INTRAVENOUS
Status: DISCONTINUED | OUTPATIENT
Start: 2021-04-15 | End: 2021-04-17 | Stop reason: HOSPADM

## 2021-04-15 RX ORDER — LIDOCAINE HYDROCHLORIDE 20 MG/ML
INJECTION, SOLUTION INFILTRATION; PERINEURAL AS NEEDED
Status: DISCONTINUED | OUTPATIENT
Start: 2021-04-15 | End: 2021-04-15 | Stop reason: SURG

## 2021-04-15 RX ORDER — INSULIN LISPRO 100 [IU]/ML
0-7 INJECTION, SOLUTION INTRAVENOUS; SUBCUTANEOUS
Status: DISCONTINUED | OUTPATIENT
Start: 2021-04-15 | End: 2021-04-17 | Stop reason: HOSPADM

## 2021-04-15 RX ORDER — SODIUM CHLORIDE, SODIUM LACTATE, POTASSIUM CHLORIDE, CALCIUM CHLORIDE 600; 310; 30; 20 MG/100ML; MG/100ML; MG/100ML; MG/100ML
INJECTION, SOLUTION INTRAVENOUS CONTINUOUS PRN
Status: DISCONTINUED | OUTPATIENT
Start: 2021-04-15 | End: 2021-04-15 | Stop reason: SURG

## 2021-04-15 RX ORDER — METOCLOPRAMIDE HYDROCHLORIDE 5 MG/ML
10 INJECTION INTRAMUSCULAR; INTRAVENOUS EVERY 6 HOURS
Status: DISCONTINUED | OUTPATIENT
Start: 2021-04-15 | End: 2021-04-17 | Stop reason: HOSPADM

## 2021-04-15 RX ORDER — NITROGLYCERIN 0.4 MG/1
0.4 TABLET SUBLINGUAL
Status: DISCONTINUED | OUTPATIENT
Start: 2021-04-15 | End: 2021-04-17 | Stop reason: HOSPADM

## 2021-04-15 RX ORDER — SODIUM CHLORIDE 0.9 % (FLUSH) 0.9 %
10 SYRINGE (ML) INJECTION AS NEEDED
Status: DISCONTINUED | OUTPATIENT
Start: 2021-04-15 | End: 2021-04-17 | Stop reason: HOSPADM

## 2021-04-15 RX ORDER — PANTOPRAZOLE SODIUM 40 MG/10ML
40 INJECTION, POWDER, LYOPHILIZED, FOR SOLUTION INTRAVENOUS
Status: DISCONTINUED | OUTPATIENT
Start: 2021-04-15 | End: 2021-04-15

## 2021-04-15 RX ORDER — ACETAMINOPHEN 325 MG/1
650 TABLET ORAL EVERY 4 HOURS PRN
Status: DISCONTINUED | OUTPATIENT
Start: 2021-04-15 | End: 2021-04-17 | Stop reason: HOSPADM

## 2021-04-15 RX ORDER — HYDROMORPHONE HYDROCHLORIDE 1 MG/ML
0.5 INJECTION, SOLUTION INTRAMUSCULAR; INTRAVENOUS; SUBCUTANEOUS ONCE
Status: COMPLETED | OUTPATIENT
Start: 2021-04-15 | End: 2021-04-15

## 2021-04-15 RX ORDER — ACETAMINOPHEN 650 MG/1
650 SUPPOSITORY RECTAL EVERY 4 HOURS PRN
Status: DISCONTINUED | OUTPATIENT
Start: 2021-04-15 | End: 2021-04-17 | Stop reason: HOSPADM

## 2021-04-15 RX ORDER — ACETAMINOPHEN 160 MG/5ML
650 SOLUTION ORAL EVERY 4 HOURS PRN
Status: DISCONTINUED | OUTPATIENT
Start: 2021-04-15 | End: 2021-04-17 | Stop reason: HOSPADM

## 2021-04-15 RX ORDER — SODIUM CHLORIDE 0.9 % (FLUSH) 0.9 %
10 SYRINGE (ML) INJECTION EVERY 12 HOURS SCHEDULED
Status: DISCONTINUED | OUTPATIENT
Start: 2021-04-15 | End: 2021-04-17 | Stop reason: HOSPADM

## 2021-04-15 RX ORDER — PROPOFOL 10 MG/ML
VIAL (ML) INTRAVENOUS CONTINUOUS PRN
Status: DISCONTINUED | OUTPATIENT
Start: 2021-04-15 | End: 2021-04-15 | Stop reason: SURG

## 2021-04-15 RX ORDER — ONDANSETRON 2 MG/ML
4 INJECTION INTRAMUSCULAR; INTRAVENOUS EVERY 6 HOURS PRN
Status: DISCONTINUED | OUTPATIENT
Start: 2021-04-15 | End: 2021-04-16

## 2021-04-15 RX ORDER — METOCLOPRAMIDE HYDROCHLORIDE 5 MG/ML
10 INJECTION INTRAMUSCULAR; INTRAVENOUS ONCE
Status: COMPLETED | OUTPATIENT
Start: 2021-04-15 | End: 2021-04-15

## 2021-04-15 RX ORDER — NICOTINE POLACRILEX 4 MG
15 LOZENGE BUCCAL
Status: DISCONTINUED | OUTPATIENT
Start: 2021-04-15 | End: 2021-04-17 | Stop reason: HOSPADM

## 2021-04-15 RX ORDER — ONDANSETRON 2 MG/ML
4 INJECTION INTRAMUSCULAR; INTRAVENOUS ONCE
Status: COMPLETED | OUTPATIENT
Start: 2021-04-15 | End: 2021-04-15

## 2021-04-15 RX ORDER — SODIUM CHLORIDE 9 MG/ML
100 INJECTION, SOLUTION INTRAVENOUS CONTINUOUS
Status: DISCONTINUED | OUTPATIENT
Start: 2021-04-15 | End: 2021-04-17 | Stop reason: HOSPADM

## 2021-04-15 RX ORDER — HYDROMORPHONE HYDROCHLORIDE 1 MG/ML
0.5 INJECTION, SOLUTION INTRAMUSCULAR; INTRAVENOUS; SUBCUTANEOUS
Status: DISCONTINUED | OUTPATIENT
Start: 2021-04-15 | End: 2021-04-17 | Stop reason: HOSPADM

## 2021-04-15 RX ADMIN — SODIUM CHLORIDE, PRESERVATIVE FREE 10 ML: 5 INJECTION INTRAVENOUS at 20:32

## 2021-04-15 RX ADMIN — PANTOPRAZOLE SODIUM 40 MG: 40 INJECTION, POWDER, FOR SOLUTION INTRAVENOUS at 06:06

## 2021-04-15 RX ADMIN — ONDANSETRON 4 MG: 2 INJECTION INTRAMUSCULAR; INTRAVENOUS at 10:05

## 2021-04-15 RX ADMIN — HYDROMORPHONE HYDROCHLORIDE 0.5 MG: 1 INJECTION, SOLUTION INTRAMUSCULAR; INTRAVENOUS; SUBCUTANEOUS at 04:16

## 2021-04-15 RX ADMIN — METOCLOPRAMIDE HYDROCHLORIDE 10 MG: 5 INJECTION INTRAMUSCULAR; INTRAVENOUS at 05:29

## 2021-04-15 RX ADMIN — LORAZEPAM 0.5 MG: 2 INJECTION INTRAMUSCULAR; INTRAVENOUS at 04:16

## 2021-04-15 RX ADMIN — LIDOCAINE HYDROCHLORIDE 100 MG: 20 INJECTION, SOLUTION INFILTRATION; PERINEURAL at 12:59

## 2021-04-15 RX ADMIN — SODIUM CHLORIDE 100 ML/HR: 9 INJECTION, SOLUTION INTRAVENOUS at 06:09

## 2021-04-15 RX ADMIN — METOCLOPRAMIDE HYDROCHLORIDE 10 MG: 5 INJECTION INTRAMUSCULAR; INTRAVENOUS at 10:05

## 2021-04-15 RX ADMIN — SODIUM CHLORIDE, PRESERVATIVE FREE 10 ML: 5 INJECTION INTRAVENOUS at 06:06

## 2021-04-15 RX ADMIN — Medication: at 16:52

## 2021-04-15 RX ADMIN — PANTOPRAZOLE SODIUM 80 MG: 40 INJECTION, POWDER, FOR SOLUTION INTRAVENOUS at 04:15

## 2021-04-15 RX ADMIN — SODIUM CHLORIDE 100 ML/HR: 9 INJECTION, SOLUTION INTRAVENOUS at 14:24

## 2021-04-15 RX ADMIN — HYDROMORPHONE HYDROCHLORIDE 0.5 MG: 1 INJECTION, SOLUTION INTRAMUSCULAR; INTRAVENOUS; SUBCUTANEOUS at 16:00

## 2021-04-15 RX ADMIN — PROPOFOL 100 MCG/KG/MIN: 10 INJECTION, EMULSION INTRAVENOUS at 13:01

## 2021-04-15 RX ADMIN — HYDROMORPHONE HYDROCHLORIDE 0.5 MG: 1 INJECTION, SOLUTION INTRAMUSCULAR; INTRAVENOUS; SUBCUTANEOUS at 06:05

## 2021-04-15 RX ADMIN — METOCLOPRAMIDE HYDROCHLORIDE 10 MG: 5 INJECTION INTRAMUSCULAR; INTRAVENOUS at 15:46

## 2021-04-15 RX ADMIN — METOCLOPRAMIDE HYDROCHLORIDE 10 MG: 5 INJECTION INTRAMUSCULAR; INTRAVENOUS at 20:30

## 2021-04-15 RX ADMIN — ONDANSETRON 4 MG: 2 INJECTION INTRAMUSCULAR; INTRAVENOUS at 04:16

## 2021-04-15 RX ADMIN — PROPOFOL 150 MG: 10 INJECTION, EMULSION INTRAVENOUS at 13:00

## 2021-04-15 RX ADMIN — HYDROMORPHONE HYDROCHLORIDE 0.5 MG: 1 INJECTION, SOLUTION INTRAMUSCULAR; INTRAVENOUS; SUBCUTANEOUS at 11:08

## 2021-04-15 RX ADMIN — SODIUM CHLORIDE 1000 ML: 9 INJECTION, SOLUTION INTRAVENOUS at 04:09

## 2021-04-15 RX ADMIN — SODIUM CHLORIDE, POTASSIUM CHLORIDE, SODIUM LACTATE AND CALCIUM CHLORIDE: 600; 310; 30; 20 INJECTION, SOLUTION INTRAVENOUS at 12:56

## 2021-04-15 NOTE — ANESTHESIA PREPROCEDURE EVALUATION
Anesthesia Evaluation     Patient summary reviewed and Nursing notes reviewed   NPO Solid Status: > 8 hours  NPO Liquid Status: > 8 hours           Airway   Mallampati: II  Neck ROM: full  No difficulty expected  Dental    (+) poor dentition    Pulmonary     breath sounds clear to auscultation  (+) a smoker Current,   Cardiovascular     Rhythm: regular        Neuro/Psych  (+) psychiatric history Anxiety and Depression,     GI/Hepatic/Renal/Endo    (+)  GERD, GI bleeding , diabetes mellitus,     Musculoskeletal     Abdominal    Substance History   (+) alcohol use,      OB/GYN          Other                        Anesthesia Plan    ASA 2     MAC     intravenous induction     Anesthetic plan, all risks, benefits, and alternatives have been provided, discussed and informed consent has been obtained with: patient.       Jess Fontana PA-C

## 2021-04-15 NOTE — ANESTHESIA POSTPROCEDURE EVALUATION
"Patient: Morro Blancas    Procedure Summary     Date: 04/15/21 Room / Location:  [facility]    Anesthesia Start: 1256 Anesthesia Stop: 1320    Procedure: ESOPHAGOGASTRODUODENOSCOPY (N/A Esophagus) Diagnosis:       Acute upper GI bleed      (Acute upper GI bleed [K92.2])    Surgeons: Da Fleming MD Provider: João Ortiz MD    Anesthesia Type: MAC ASA Status: 2          Anesthesia Type: MAC    Vitals  No vitals data found for the desired time range.          Post Anesthesia Care and Evaluation    Patient location during evaluation: bedside  Patient participation: complete - patient participated  Level of consciousness: sleepy but conscious  Pain score: 0  Pain management: adequate  Airway patency: patent  Anesthetic complications: No anesthetic complications    Cardiovascular status: acceptable  Respiratory status: acceptable  Hydration status: acceptable    Comments: /91 (BP Location: Left arm, Patient Position: Lying)   Pulse 96   Temp 36.3 °C (97.4 °F) (Oral)   Resp 20   Ht 177.8 cm (70\")   Wt 60.3 kg (133 lb)   SpO2 97%   BMI 19.08 kg/m²         "

## 2021-04-15 NOTE — PLAN OF CARE
Problem: Adult Inpatient Plan of Care  Goal: Plan of Care Review  Outcome: Ongoing, Progressing  Flowsheets (Taken 4/15/2021 1711)  Progress: no change  Plan of Care Reviewed With: patient  Outcome Summary: VSS, arrived from [facility], no c/o nausea, c/o abd pain, pain controlled with IV pain medication, clear liquid diet for dinner, advance as tolerated, insulin pump, up ad ivan.  Goal: Patient-Specific Goal (Individualized)  Outcome: Ongoing, Progressing  Goal: Absence of Hospital-Acquired Illness or Injury  Outcome: Ongoing, Progressing  Intervention: Identify and Manage Fall Risk  Recent Flowsheet Documentation  Taken 4/15/2021 1556 by Virginie Vazquez RN  Safety Promotion/Fall Prevention:   assistive device/personal items within reach   clutter free environment maintained   nonskid shoes/slippers when out of bed   room organization consistent   safety round/check completed  Taken 4/15/2021 1400 by Virginie Vazquez RN  Safety Promotion/Fall Prevention:   assistive device/personal items within reach   clutter free environment maintained   nonskid shoes/slippers when out of bed   room organization consistent   safety round/check completed  Intervention: Prevent Skin Injury  Recent Flowsheet Documentation  Taken 4/15/2021 1556 by Virginie Vazquez RN  Body Position: supine  Taken 4/15/2021 1400 by Virginie Vazquez RN  Body Position:   supine   position changed independently  Goal: Optimal Comfort and Wellbeing  Outcome: Ongoing, Progressing  Goal: Readiness for Transition of Care  Outcome: Ongoing, Progressing  Intervention: Mutually Develop Transition Plan  Recent Flowsheet Documentation  Taken 4/15/2021 1400 by Virginie Vazquez RN  Equipment Currently Used at Home:   medication pump   glucometer  Transportation Anticipated: family or friend will provide  Transportation Concerns: car, none  Patient/Family Anticipated Services at Transition: none  Patient/Family Anticipates Transition to: home with  family     Problem: Diabetes Comorbidity  Goal: Blood Glucose Level Within Desired Range  Outcome: Ongoing, Progressing     Problem: Adjustment to Illness (Gastrointestinal Bleeding)  Goal: Optimal Coping with Acute Illness  Outcome: Ongoing, Progressing     Problem: Bleeding (Gastrointestinal Bleeding)  Goal: Hemostasis  Outcome: Ongoing, Progressing   Goal Outcome Evaluation:  Plan of Care Reviewed With: patient  Progress: no change  Outcome Summary: VSS, arrived from ENDO, no c/o nausea, c/o abd pain, pain controlled with IV pain medication, clear liquid diet for dinner, advance as tolerated, insulin pump, up ad ivan.

## 2021-04-15 NOTE — H&P
HISTORY AND PHYSICAL   [facility]        Patient Identification:  Name: Morro Blancas  Age: 30 y.o.  Sex: male  :  1990  MRN: [id number]                     Primary Care Physician: Gale Banegas APRN    Chief Complaint:  Abdominal pain and vomiting blood    History of Present Illness:       The patient is a 30 y.o. male with history of diabetes, GERD, gastroparesis, depression, anxiety, and dyspepsia who presents to the hospital complaining of sudden onset of generalized abdominal pain with nausea and vomiting that occurred 4 to 5 hours prior to coming to hospital ER.  The patient states that the emesis is bloody.  The patient has a history of diabetic gastroparesis and was actually seen here earlier in the evening prior to admission for similar symptoms however there was no blood in the emesis at that particular point.  He complains of generalized abdominal pain described as an achy sensation that is nonradiating.  The patient denies history of esophageal varices or liver disease.  There currently no aggravating or alleviating factors.  The patient states that he did take his oral Reglan prior to ED arrival but vomited the medication back up so it did not take effect.  Symptoms are currently moderate to severe in intensity and have been worsening since onset.       Past Medical History:  Past Medical History:   Diagnosis Date   • Anxiety    • Anxiety and depression    • Colon polyp 2017    1   • Depression    • Diabetes type 1, uncontrolled (CMS/HCC)     diagnosed at age 17   • Dyspepsia    • Family hx of colon cancer    • Gastroparesis    • GERD (gastroesophageal reflux disease)      Past Surgical History:  Past Surgical History:   Procedure Laterality Date   • COLONOSCOPY N/A 2017    One 3 mm polyp in the sigmoid colon, Granularity in the terminal ileum      Home Meds:  (Not in a hospital admission)    Current meds    Current Facility-Administered Medications:   •  acetaminophen  (TYLENOL) tablet 650 mg, 650 mg, Oral, Q4H PRN **OR** acetaminophen (TYLENOL) 160 MG/5ML solution 650 mg, 650 mg, Oral, Q4H PRN **OR** acetaminophen (TYLENOL) suppository 650 mg, 650 mg, Rectal, Q4H PRN, Anupama Jones APRN  •  dextrose (D50W) 25 g/ 50mL Intravenous Solution 25 g, 25 g, Intravenous, Q15 Min PRN, Anupama Jones APRN  •  dextrose (GLUTOSE) oral gel 15 g, 15 g, Oral, Q15 Min PRN, Anupama Jones APRN  •  glucagon (human recombinant) (GLUCAGEN DIAGNOSTIC) injection 1 mg, 1 mg, Subcutaneous, Q15 Min PRN, Anupama Jones APRN  •  HYDROmorphone (DILAUDID) injection 0.5 mg, 0.5 mg, Intravenous, Q2H PRN, Yonatan Benavides MD, 0.5 mg at 04/15/21 1108  •  insulin lispro (ADMELOG) injection 0-7 Units, 0-7 Units, Subcutaneous, TID Robert CHADWICK Stephanie M, APRN  •  metoclopramide (REGLAN) injection 10 mg, 10 mg, Intravenous, Q6H, Lena Aguayo MD, 10 mg at 04/15/21 1005  •  nitroglycerin (NITROSTAT) SL tablet 0.4 mg, 0.4 mg, Sublingual, Q5 Min PRN, Anupama Jones APRN  •  ondansetron (ZOFRAN) injection 4 mg, 4 mg, Intravenous, Q6H PRN, Anupama Jones APRN, 4 mg at 04/15/21 1005  •  pantoprazole (PROTONIX) injection 40 mg, 40 mg, Intravenous, BID Dede CHADWICK Lauren C., MD  •  [COMPLETED] Insert peripheral IV, , , Once **AND** sodium chloride 0.9 % flush 10 mL, 10 mL, Intravenous, PRN, Virgilio Toscano MD  •  sodium chloride 0.9 % flush 10 mL, 10 mL, Intravenous, Q12H, Anupama Jones APRN  •  sodium chloride 0.9 % flush 10 mL, 10 mL, Intravenous, PRN, Anupama Jones APRN, 10 mL at 04/15/21 0606  •  sodium chloride 0.9 % infusion, 100 mL/hr, Intravenous, Continuous, Anupama Jones APRN, Last Rate: 100 mL/hr at 04/15/21 0609, 100 mL/hr at 04/15/21 0609    Current Outpatient Medications:   •  Continuous Blood Gluc Transmit (Dexcom G6 Transmitter) misc, , Disp: , Rfl:   •  dexlansoprazole (Dexilant) 60 MG capsule, Take one capsule by mouth daily, Disp: 90  capsule, Rfl: 3  •  Glucagon, rDNA, (Glucagon Emergency) 1 MG kit, , Disp: , Rfl:   •  insulin lispro (humaLOG) 100 UNIT/ML injection, Inject  under the skin into the appropriate area as directed 3 (Three) Times a Day Before Meals., Disp: , Rfl:   •  metoclopramide (Reglan) 5 MG tablet, Take 1 tablet by mouth 4 (Four) Times a Day Before Meals & at Bedtime As Needed (nausea/vomiting/gatroparesis)., Disp: 20 tablet, Rfl: 0  •  promethazine (PHENERGAN) 25 MG suppository, Insert 1 suppository into the rectum Every 6 (Six) Hours As Needed for Nausea or Vomiting., Disp: 20 suppository, Rfl: 0  Allergies:  No Known Allergies  Immunizations:  Immunization History   Administered Date(s) Administered   • Influenza, Unspecified 08/14/2019     Social History:   Social History     Social History Narrative   • Not on file     Social History     Socioeconomic History   • Marital status: Significant Other     Spouse name: Not on file   • Number of children: Not on file   • Years of education: Not on file   • Highest education level: Not on file   Tobacco Use   • Smoking status: Current Every Day Smoker     Packs/day: 0.50     Types: Cigarettes     Start date: 2005   • Smokeless tobacco: Never Used   Vaping Use   • Vaping Use: Never used   Substance and Sexual Activity   • Alcohol use: Yes     Comment: DAILY, 1/2 GALLON   • Drug use: Yes     Frequency: 7.0 times per week     Types: Marijuana     Comment: DAILY   • Sexual activity: Defer       Family History:  Family History   Problem Relation Age of Onset   • Cancer Mother    • Hypertension Father    • Depression Paternal Uncle    • Hypertension Maternal Grandmother    • Irritable bowel syndrome Maternal Grandmother    • Colon cancer Maternal Grandmother    • Hypertension Maternal Grandfather    • Alcohol abuse Maternal Grandfather    • Hypertension Paternal Grandmother    • Cancer Paternal Grandmother    • COPD Paternal Grandmother    • Depression Paternal Grandmother    • Heart  "disease Paternal Grandfather    • Hypertension Paternal Grandfather         Review of Systems  See history of present illness and past medical history.  Patient denies headache, dizziness, syncope, falls, trauma, change in vision, change in hearing, change in taste, changes in weight, changes in appetite, focal weakness, numbness, or paresthesia.  Patient denies chest pain, palpitations, dyspnea, orthopnea, PND, cough, sinus pressure, rhinorrhea, epistaxis, hemoptysis, diarrhea, constipation or hematchezia.  Denies cold or heat intolerance, polydipsia, polyuria, polyphagia. Denies hematuria, pyuria, dysuria, hesitancy, frequency or urgency.   Denies fever, chills, sweats, night sweats.  Denies missing any routine medications. Remainder of ROS is negative.    Objective:  tMax 24 hrs: Temp (24hrs), Av.4 °F (36.3 °C), Min:97.4 °F (36.3 °C), Max:97.4 °F (36.3 °C)    Vitals Ranges:   Temp:  [97.4 °F (36.3 °C)] 97.4 °F (36.3 °C)  Heart Rate:  [] 104  Resp:  [22] 22  BP: (130-167)/() 130/89      Exam:  /89   Pulse 104   Temp 97.4 °F (36.3 °C) (Oral)   Resp 22   Ht 177.8 cm (70\")   Wt 60.3 kg (133 lb)   SpO2 97%   BMI 19.08 kg/m²     General Appearance:    Alert, cooperative, no distress, appears stated age   Head:    Normocephalic, without obvious abnormality, atraumatic   Eyes:    PERRL, conjunctivae/corneas clear, EOM's intact, both eyes   Ears:    Normal external ear canals, both ears   Nose:   Nares normal, septum midline, mucosa normal, no drainage    or sinus tenderness   Throat:   Lips, mucosa, and tongue normal   Neck:   Supple, symmetrical, trachea midline, no adenopathy;     thyroid:  no enlargement/tenderness/nodules; no carotid    bruit or JVD   Back:     Symmetric, no curvature, ROM normal, no CVA tenderness   Lungs:     Clear to auscultation bilaterally, respirations unlabored   Chest Wall:    No tenderness or deformity    Heart:    Regular rate and rhythm, S1 and S2 normal, no " murmur, rub   or gallop   Abdomen:     Soft, mild upper abdominal tenderness, bowel sounds active all four quadrants,     no masses, no hepatomegaly, no splenomegaly   Extremities:   Extremities normal, atraumatic, no cyanosis or edema   Pulses:   2+ and symmetric all extremities   Skin:   Skin color, texture, turgor normal, no rashes or lesions   Lymph nodes:   Cervical, supraclavicular, and axillary nodes normal   Neurologic:   CNII-XII intact, normal strength, sensation intact throughout      .    Data Review:  Lab Results (last 72 hours)     Procedure Component Value Units Date/Time    POC Glucose Once [[id number]]  (Abnormal) Collected: 04/15/21 0803    Specimen: Blood Updated: 04/15/21 0805     Glucose 169 mg/dL     COVID PRE-OP / PRE-PROCEDURE SCREENING ORDER (NO ISOLATION) - Swab, Nasopharynx [[id number]] Collected: 04/15/21 0543    Specimen: Swab from Nasopharynx Updated: 04/15/21 0550    Narrative:      The following orders were created for panel order COVID PRE-OP / PRE-PROCEDURE SCREENING ORDER (NO ISOLATION) - Swab, Nasopharynx.  Procedure                               Abnormality         Status                     ---------                               -----------         ------                     COVID-19,APTIMA PANTHER,...[[id number]]                      In process                   Please view results for these tests on the individual orders.    COVID-19,APTIMA PANTHER,[facility]SE, NP/OP SWAB IN UTM/VTM/SALINE TRANSPORT MEDIA,24 HR TAT - Swab, Nasopharynx [[id number]] Collected: 04/15/21 0543    Specimen: Swab from Nasopharynx Updated: 04/15/21 0550    Protime-INR [[id number]]  (Normal) Collected: 04/15/21 0407    Specimen: Blood Updated: 04/15/21 0456     Protime 12.3 Seconds      INR 0.93    aPTT [[id number]]  (Abnormal) Collected: 04/15/21 0407    Specimen: Blood Updated: 04/15/21 0456     PTT 22.1 seconds     Comprehensive Metabolic Panel [[id number]]  (Abnormal) Collected: 04/15/21 0407     Specimen: Blood Updated: 04/15/21 0432     Glucose 207 mg/dL      BUN 12 mg/dL      Creatinine 0.76 mg/dL      Sodium 141 mmol/L      Potassium 3.8 mmol/L      Comment: Slight hemolysis detected by analyzer. Results may be affected.        Chloride 101 mmol/L      CO2 25.3 mmol/L      Calcium 9.7 mg/dL      Total Protein 7.3 g/dL      Albumin 4.50 g/dL      ALT (SGPT) 17 U/L      AST (SGOT) 15 U/L      Alkaline Phosphatase 77 U/L      Total Bilirubin 0.7 mg/dL      eGFR Non African Amer 120 mL/min/1.73      Globulin 2.8 gm/dL      A/G Ratio 1.6 g/dL      BUN/Creatinine Ratio 15.8     Anion Gap 14.7 mmol/L     Narrative:      GFR Normal >60  Chronic Kidney Disease <60  Kidney Failure <15      CBC & Differential [[id number]]  (Abnormal) Collected: 04/15/21 0407    Specimen: Blood Updated: 04/15/21 0422    Narrative:      The following orders were created for panel order CBC & Differential.  Procedure                               Abnormality         Status                     ---------                               -----------         ------                     CBC Auto Differential[[id number]]        Abnormal            Final result                 Please view results for these tests on the individual orders.    CBC Auto Differential [[id number]]  (Abnormal) Collected: 04/15/21 0407    Specimen: Blood Updated: 04/15/21 0422     WBC 13.83 10*3/mm3      RBC 4.45 10*6/mm3      Hemoglobin 14.2 g/dL      Hematocrit 40.2 %      MCV 90.3 fL      MCH 31.9 pg      MCHC 35.3 g/dL      RDW 13.4 %      RDW-SD 44.7 fl      MPV 10.8 fL      Platelets 301 10*3/mm3      Neutrophil % 77.7 %      Lymphocyte % 16.5 %      Monocyte % 5.1 %      Eosinophil % 0.1 %      Basophil % 0.3 %      Immature Grans % 0.3 %      Neutrophils, Absolute 10.75 10*3/mm3      Lymphocytes, Absolute 2.28 10*3/mm3      Monocytes, Absolute 0.71 10*3/mm3      Eosinophils, Absolute 0.01 10*3/mm3      Basophils, Absolute 0.04 10*3/mm3      Immature Grans, Absolute  0.04 10*3/mm3      nRBC 0.0 /100 WBC                    Imaging Results (All)     None        Past Medical History:   Diagnosis Date   • Anxiety    • Anxiety and depression    • Colon polyp 8/30/2017    1   • Depression    • Diabetes type 1, uncontrolled (CMS/HCC)     diagnosed at age 17   • Dyspepsia    • Family hx of colon cancer    • Gastroparesis    • GERD (gastroesophageal reflux disease)        Assessment:  Active Hospital Problems    Diagnosis  POA   • **Acute upper GI bleed [K92.2]  Yes   • Alcohol abuse [F10.10]  Yes   • Tobacco abuse [Z72.0]  Yes   • Gastroparesis [K31.84]  Yes   • Type 1 diabetes mellitus with hyperglycemia (CMS/HCC) [E10.65]  Yes   • Anxiety and depression [F41.9, F32.9]  Yes      Resolved Hospital Problems   No resolved problems to display.       Plan:  The patient is admitted to the hospital and will consult GI medicine.  Patient started on some IV Protonix.  We will monitor hemoglobin and transfuse if needed.  We will get some follow-up lab studies.    Yonatan Benavides MD  4/15/2021  11:10 EDT

## 2021-04-15 NOTE — ED PROVIDER NOTES
EMERGENCY DEPARTMENT ENCOUNTER    CHIEF COMPLAINT  Chief Complaint: Abdominal pain/vomiting blood  History given by: Patient  History limited by: None  Room Number: 16/16  PMD: Gale Banegas, YUDELKA      HPI:  Pt is a 30 y.o. male who presents complaining of sudden onset of generalized abdominal pain with nausea and vomiting that occurred 4 to 5 hours prior to ED arrival this evening.  The patient states that the emesis is bloody.  The patient has a history of diabetic gastroparesis and was actually seen here earlier in the evening yesterday for similar symptoms however there was no blood in the emesis at that particular point.  He complains of generalized abdominal pain described as an achy sensation that is nonradiating.  The patient denies history of esophageal varices or liver disease.  There currently no aggravating or alleviating factors.  The patient states that he did take his oral Reglan prior to ED arrival but vomited the medication back up so it did not take effect.  Symptoms are currently moderate to severe in intensity and have been worsening since onset.      PAST MEDICAL HISTORY  Active Ambulatory Problems     Diagnosis Date Noted   • Type 1 diabetes mellitus with hyperglycemia (CMS/HCC) 06/15/2017   • Fecal incontinence 06/15/2017   • Erectile dysfunction 06/15/2017   • Anxiety and depression 06/15/2017   • Hyperplastic colonic polyp 11/04/2020   • Gastroparesis 11/04/2020   • Family history of colorectal cancer 11/04/2020   • Sepsis without acute organ dysfunction (CMS/HCC) 03/21/2021   • Alcohol abuse 03/21/2021   • Tobacco abuse 03/21/2021     Resolved Ambulatory Problems     Diagnosis Date Noted   • Intractable vomiting 03/20/2021     Past Medical History:   Diagnosis Date   • Anxiety    • Colon polyp 8/30/2017   • Depression    • Diabetes type 1, uncontrolled (CMS/HCC)    • Dyspepsia    • Family hx of colon cancer    • GERD (gastroesophageal reflux disease)        PAST SURGICAL HISTORY  Past  Surgical History:   Procedure Laterality Date   • COLONOSCOPY N/A 8/30/2017    One 3 mm polyp in the sigmoid colon, Granularity in the terminal ileum       FAMILY HISTORY  Family History   Problem Relation Age of Onset   • Cancer Mother    • Hypertension Father    • Depression Paternal Uncle    • Hypertension Maternal Grandmother    • Irritable bowel syndrome Maternal Grandmother    • Colon cancer Maternal Grandmother    • Hypertension Maternal Grandfather    • Alcohol abuse Maternal Grandfather    • Hypertension Paternal Grandmother    • Cancer Paternal Grandmother    • COPD Paternal Grandmother    • Depression Paternal Grandmother    • Heart disease Paternal Grandfather    • Hypertension Paternal Grandfather        SOCIAL HISTORY  Social History     Socioeconomic History   • Marital status: Significant Other     Spouse name: Not on file   • Number of children: Not on file   • Years of education: Not on file   • Highest education level: Not on file   Tobacco Use   • Smoking status: Current Every Day Smoker     Packs/day: 0.50     Types: Cigarettes     Start date: 2005   • Smokeless tobacco: Never Used   Vaping Use   • Vaping Use: Never used   Substance and Sexual Activity   • Alcohol use: Yes     Comment: DAILY, 1/2 GALLON   • Drug use: Yes     Frequency: 7.0 times per week     Types: Marijuana     Comment: DAILY   • Sexual activity: Defer       ALLERGIES  Patient has no known allergies.    REVIEW OF SYSTEMS  Review of Systems   Constitutional: Negative for activity change, appetite change and fever.   HENT: Negative for congestion and sore throat.    Eyes: Negative.    Respiratory: Negative for cough and shortness of breath.    Cardiovascular: Negative for chest pain and leg swelling.   Gastrointestinal: Positive for abdominal pain, nausea and vomiting. Negative for diarrhea.   Endocrine: Negative.    Genitourinary: Negative for decreased urine volume and dysuria.   Musculoskeletal: Negative for neck pain.   Skin:  Negative for rash and wound.   Allergic/Immunologic: Negative.    Neurological: Negative for weakness, numbness and headaches.   Hematological: Negative.    Psychiatric/Behavioral: Negative.    All other systems reviewed and are negative.      PHYSICAL EXAM  ED Triage Vitals   Temp Heart Rate Resp BP SpO2   04/15/21 0359 04/15/21 0359 04/15/21 0359 04/15/21 0400 04/15/21 0359   97.4 °F (36.3 °C) 118 22 (!) 167/107 100 %      Temp src Heart Rate Source Patient Position BP Location FiO2 (%)   04/15/21 0359 04/15/21 0359 04/15/21 0400 04/15/21 0400 --   Oral Monitor Sitting Right arm        Physical Exam  Vitals and nursing note reviewed.   Constitutional:       General: He is not in acute distress.  HENT:      Head: Normocephalic and atraumatic.   Eyes:      Pupils: Pupils are equal, round, and reactive to light.   Cardiovascular:      Rate and Rhythm: Regular rhythm. Tachycardia present.      Heart sounds: Normal heart sounds.   Pulmonary:      Effort: Pulmonary effort is normal. No respiratory distress.      Breath sounds: Normal breath sounds.   Abdominal:      Palpations: Abdomen is soft.      Tenderness: There is generalized abdominal tenderness. There is no guarding or rebound.   Musculoskeletal:         General: Normal range of motion.      Cervical back: Normal range of motion and neck supple.   Skin:     General: Skin is warm and dry.   Neurological:      Mental Status: He is alert and oriented to person, place, and time.      Sensory: Sensation is intact.   Psychiatric:         Mood and Affect: Mood and affect normal.         LAB RESULTS  Lab Results (last 24 hours)     Procedure Component Value Units Date/Time    CBC & Differential [[id number]]  (Abnormal) Collected: 04/14/21 1831    Specimen: Blood Updated: 04/14/21 1850    Narrative:      The following orders were created for panel order CBC & Differential.  Procedure                               Abnormality         Status                     ---------                                -----------         ------                     CBC Auto Differential[[id number]]        Abnormal            Final result                 Please view results for these tests on the individual orders.    Comprehensive Metabolic Panel [[id number]]  (Abnormal) Collected: 04/14/21 1831    Specimen: Blood Updated: 04/14/21 1903     Glucose 214 mg/dL      BUN 13 mg/dL      Creatinine 0.82 mg/dL      Sodium 139 mmol/L      Potassium 3.8 mmol/L      Chloride 100 mmol/L      CO2 27.3 mmol/L      Calcium 9.9 mg/dL      Total Protein 7.3 g/dL      Albumin 4.60 g/dL      ALT (SGPT) 20 U/L      AST (SGOT) 15 U/L      Alkaline Phosphatase 79 U/L      Total Bilirubin 0.4 mg/dL      eGFR Non African Amer 110 mL/min/1.73      Globulin 2.7 gm/dL      A/G Ratio 1.7 g/dL      BUN/Creatinine Ratio 15.9     Anion Gap 11.7 mmol/L     Narrative:      GFR Normal >60  Chronic Kidney Disease <60  Kidney Failure <15      Lipase [[id number]]  (Normal) Collected: 04/14/21 1831    Specimen: Blood Updated: 04/14/21 1903     Lipase 23 U/L     CBC Auto Differential [[id number]]  (Abnormal) Collected: 04/14/21 1831    Specimen: Blood Updated: 04/14/21 1850     WBC 14.28 10*3/mm3      RBC 4.64 10*6/mm3      Hemoglobin 15.0 g/dL      Hematocrit 41.9 %      MCV 90.3 fL      MCH 32.3 pg      MCHC 35.8 g/dL      RDW 13.4 %      RDW-SD 43.7 fl      MPV 10.9 fL      Platelets 285 10*3/mm3      Neutrophil % 82.8 %      Lymphocyte % 12.3 %      Monocyte % 3.8 %      Eosinophil % 0.3 %      Basophil % 0.3 %      Immature Grans % 0.5 %      Neutrophils, Absolute 11.83 10*3/mm3      Lymphocytes, Absolute 1.76 10*3/mm3      Monocytes, Absolute 0.54 10*3/mm3      Eosinophils, Absolute 0.04 10*3/mm3      Basophils, Absolute 0.04 10*3/mm3      Immature Grans, Absolute 0.07 10*3/mm3      nRBC 0.0 /100 WBC     Magnesium [[id number]]  (Normal) Collected: 04/14/21 1831    Specimen: Blood Updated: 04/14/21 1903     Magnesium 1.8 mg/dL     Ethanol  [[id number]] Collected: 04/14/21 1831    Specimen: Blood Updated: 04/14/21 1916     Ethanol <10 mg/dL      Ethanol % <0.010 %     Ketone Bodies, Serum (Not performed at [place]) [[id number]]  (Abnormal) Collected: 04/14/21 1831    Specimen: Blood Updated: 04/14/21 1919    Narrative:      The following orders were created for panel order Ketone Bodies, Serum (Not performed at [place]).  Procedure                               Abnormality         Status                     ---------                               -----------         ------                     Beta Hydroxybutyrate Paul...[[id number]]  Abnormal            Final result                 Please view results for these tests on the individual orders.    Beta Hydroxybutyrate Quantitative [[id number]]  (Abnormal) Collected: 04/14/21 1831    Specimen: Blood Updated: 04/14/21 1919     Beta-Hydroxybutyrate Quant 1.032 mmol/L     Narrative:      In the assessment of possible diabetic ketoacidosis, the test should be interpreted along with other clinical and laboratory findings.  A level greater than 1 mmol/L should require further evaluation and levels of more than 3 mmol/L require immediate medical review.    CBC & Differential [[id number]]  (Abnormal) Collected: 04/15/21 0407    Specimen: Blood Updated: 04/15/21 0422    Narrative:      The following orders were created for panel order CBC & Differential.  Procedure                               Abnormality         Status                     ---------                               -----------         ------                     CBC Auto Differential[[id number]]        Abnormal            Final result                 Please view results for these tests on the individual orders.    Comprehensive Metabolic Panel [[id number]]  (Abnormal) Collected: 04/15/21 0407    Specimen: Blood Updated: 04/15/21 0432     Glucose 207 mg/dL      BUN 12 mg/dL      Creatinine 0.76 mg/dL      Sodium 141 mmol/L      Potassium 3.8 mmol/L       Comment: Slight hemolysis detected by analyzer. Results may be affected.        Chloride 101 mmol/L      CO2 25.3 mmol/L      Calcium 9.7 mg/dL      Total Protein 7.3 g/dL      Albumin 4.50 g/dL      ALT (SGPT) 17 U/L      AST (SGOT) 15 U/L      Alkaline Phosphatase 77 U/L      Total Bilirubin 0.7 mg/dL      eGFR Non African Amer 120 mL/min/1.73      Globulin 2.8 gm/dL      A/G Ratio 1.6 g/dL      BUN/Creatinine Ratio 15.8     Anion Gap 14.7 mmol/L     Narrative:      GFR Normal >60  Chronic Kidney Disease <60  Kidney Failure <15      Protime-INR [[id number]]  (Normal) Collected: 04/15/21 0407    Specimen: Blood Updated: 04/15/21 0456     Protime 12.3 Seconds      INR 0.93    aPTT [[id number]]  (Abnormal) Collected: 04/15/21 0407    Specimen: Blood Updated: 04/15/21 0456     PTT 22.1 seconds     CBC Auto Differential [[id number]]  (Abnormal) Collected: 04/15/21 0407    Specimen: Blood Updated: 04/15/21 0422     WBC 13.83 10*3/mm3      RBC 4.45 10*6/mm3      Hemoglobin 14.2 g/dL      Hematocrit 40.2 %      MCV 90.3 fL      MCH 31.9 pg      MCHC 35.3 g/dL      RDW 13.4 %      RDW-SD 44.7 fl      MPV 10.8 fL      Platelets 301 10*3/mm3      Neutrophil % 77.7 %      Lymphocyte % 16.5 %      Monocyte % 5.1 %      Eosinophil % 0.1 %      Basophil % 0.3 %      Immature Grans % 0.3 %      Neutrophils, Absolute 10.75 10*3/mm3      Lymphocytes, Absolute 2.28 10*3/mm3      Monocytes, Absolute 0.71 10*3/mm3      Eosinophils, Absolute 0.01 10*3/mm3      Basophils, Absolute 0.04 10*3/mm3      Immature Grans, Absolute 0.04 10*3/mm3      nRBC 0.0 /100 WBC     COVID PRE-OP / PRE-PROCEDURE SCREENING ORDER (NO ISOLATION) - Swab, Nasopharynx [[id number]] Collected: 04/15/21 0543    Specimen: Swab from Nasopharynx Updated: 04/15/21 0550    Narrative:      The following orders were created for panel order COVID PRE-OP / PRE-PROCEDURE SCREENING ORDER (NO ISOLATION) - Swab, Nasopharynx.  Procedure                               Abnormality          Status                     ---------                               -----------         ------                     COVID-19,APTIMA PANTHER,...[[id number]]                      In process                   Please view results for these tests on the individual orders.    COVID-19,APTIMA PANTHER,[facility] IN-HOUSE, NP/OP SWAB IN UTM/VTM/[facility] TRANSPORT MEDIA,24 HR TAT - Swab, Nasopharynx [[id number]] Collected: 04/15/21 0543    Specimen: Swab from Nasopharynx Updated: 04/15/21 0550          I ordered the above labs and reviewed the results    RADIOLOGY  No orders to display        I ordered the above noted radiological studies. Interpreted by radiologist.  Reviewed by me in PACS.       PROCEDURES  Procedures      PROGRESS AND CONSULTS     The patient was wearing a facemask upon entrance into the room and remained in such throughout their visit.  I was wearing PPE including a facemask, eye protection, as well as gloves at any point entering the room and throughout the visit    0410  The patient does appear to have signs of an upper GI bleed upon exploration of the patient's emesis.  We will treat the patient's discomfort and give the patient IV Protonix as we work him up.  He denies any history of liver disease or esophageal varices.    0515  Upon reevaluation, the patient states that his symptoms have improved somewhat though he remains fairly nauseated.  I did inform him that tests are quite similar to the tests that were performed yesterday.  There is no signs of diabetic ketoacidosis and the patient's hemoglobin is stable.  We will admit the patient to the hospital for further management and treatment.  The patient agrees with the plan and all questions have been answered.    0545  Case discussed with YUDELKA Kirk for [facility], who agrees to admit the patient to the hospital for Dr. Hodges      MEDICAL DECISION MAKING  Results were reviewed/discussed with the patient and they were also made aware of online access.  Pt also made aware that some labs, such as cultures, will not be resulted during ER visit and follow up with PMD is necessary.     MDM  Number of Diagnoses or Management Options     Amount and/or Complexity of Data Reviewed  Clinical lab tests: ordered and reviewed  Tests in the medicine section of CPT®: ordered and reviewed  Review and summarize past medical records: yes (Upon medical records review, the patient was last seen and evaluated here in the ED on 4/14/2021 secondary to diabetic gastroparesis with abdominal pain.)  Discuss the patient with other providers: yes (YUDELKA Kirk for [facility], who agrees to admit the patient to the hospital for Dr. Hodges)           DIAGNOSIS  Final diagnoses:   Acute upper GI bleed   Diabetic gastroparesis associated with type 1 diabetes mellitus (CMS/HCC)   Non-intractable vomiting with nausea, unspecified vomiting type       DISPOSITION  ADMISSION    Discussed treatment plan and reason for admission with pt/family and admitting physician.  Pt/family voiced understanding of the plan for admission for further testing/treatment as needed.         Latest Documented Vital Signs:  As of 05:55 EDT  BP- 158/92 HR- 96 Temp- 97.4 °F (36.3 °C) (Oral) O2 sat- 97%         Virgilio Toscano MD  04/15/21 0555

## 2021-04-15 NOTE — CONSULTS
[facility]  Initial Inpatient Consult Note    Referring Provider: A    Reason for Consultation: Nausea, vomiting, abdominal pain and hematemesis    Subjective     History of present illness:    Thank you for asking my opinion regarding this patient  30 y.o. male , whom I have followed in the past as an outpatient, that we are asked to see for abdominal pain, hematemesis.    Patient reports a sudden onset of generalized abdominal pain with associated nausea and vomiting that began last night.  Emesis was noted to be bloody.  He presented to the emergency room initially with nausea vomiting abdominal pain and was sent home.  He returned to the bloody vomiting began.  Prior to the onset of symptoms he had felt well.  He did have a similar episode of nausea vomiting and abdominal pain in March but is felt well in between.  Sugar has been fluctuating more lately due to insurance coverage of his glucose sensor.  He denies any diarrhea.  No fevers chills or sick contacts.  No recent questionable food ingestion.  Pain is diffuse midepigastric and does not radiate.  He is nauseated at present.  He has not vomited any more blood and he has not seen blood in his stool.    Has a history of acid reflux but reports it was well controlled prior to this event.  He has a history of gastroparesis with GES 2019 showing 17% retained at 4 hours    He is a type I diabetic.  Hemoglobin is normal.  White count is elevated at 13.8.  Lipase is normal.  Ethanol level was normal.  He had a CT of the abdomen and pelvis in March 2021 for similar type presentation with the exception of the bleeding.  Pancreas was noted to be atrophic.  Colon appeared somewhat thick-walled but no other abnormalities were seen.  His last A1c 3 weeks ago was 8.1.    He had a colonoscopy in 2017 for chronic diarrhea.  Biopsies were normal at that time, hyperplastic polyp in the sigmoid colon.  He has never had an EGD.    Past Medical  History:  Past Medical History:   Diagnosis Date   • Anxiety    • Anxiety and depression    • Colon polyp 8/30/2017    1   • Depression    • Diabetes type 1, uncontrolled (CMS/HCC)     diagnosed at age 17   • Dyspepsia    • Family hx of colon cancer    • Gastroparesis    • GERD (gastroesophageal reflux disease)      Past Surgical History:  Past Surgical History:   Procedure Laterality Date   • COLONOSCOPY N/A 8/30/2017    One 3 mm polyp in the sigmoid colon, Granularity in the terminal ileum      Social History:   Social History     Tobacco Use   • Smoking status: Current Every Day Smoker     Packs/day: 0.50     Types: Cigarettes     Start date: 2005   • Smokeless tobacco: Never Used   Substance Use Topics   • Alcohol use: Yes     Comment: DAILY, 1/2 GALLON      Family History:  Family History   Problem Relation Age of Onset   • Cancer Mother    • Hypertension Father    • Depression Paternal Uncle    • Hypertension Maternal Grandmother    • Irritable bowel syndrome Maternal Grandmother    • Colon cancer Maternal Grandmother    • Hypertension Maternal Grandfather    • Alcohol abuse Maternal Grandfather    • Hypertension Paternal Grandmother    • Cancer Paternal Grandmother    • COPD Paternal Grandmother    • Depression Paternal Grandmother    • Heart disease Paternal Grandfather    • Hypertension Paternal Grandfather        Home Meds:  (Not in a hospital admission)    Current Meds:   insulin lispro, 0-7 Units, Subcutaneous, TID AC  pantoprazole, 40 mg, Intravenous, Q AM  sodium chloride, 10 mL, Intravenous, Q12H      Allergies:  No Known Allergies  Review of Systems  Pertinent items are noted in HPI, all other systems reviewed and negative     Objective     Vital Signs  Temp:  [96.1 °F (35.6 °C)-97.4 °F (36.3 °C)] 97.4 °F (36.3 °C)  Heart Rate:  [] 99  Resp:  [16-24] 22  BP: (134-167)/() 134/75  Physical Exam:  General Appearance:    Alert, cooperative, in no acute distress   Head:    Normocephalic,  without obvious abnormality, atraumatic   Eyes:          conjunctivae and sclerae normal, no   icterus   Throat:   no thrush, oral mucosa dry   Neck:   Supple, no adenopathy   Lungs:     Clear to auscultation bilaterally    Heart:    Regular rhythm and normal rate    Chest Wall:    No abnormalities observed   Abdomen:     Soft, nondistended, diffuse abdominal tenderness without rebound or guarding; normal bowel sounds   Extremities:   no edema, no redness   Skin:   No bruising or rash   Psychiatric:  normal mood and insight     Results Review:   I reviewed the patient's new clinical results.    Results from last 7 days   Lab Units 04/15/21  0407 04/14/21  1831   WBC 10*3/mm3 13.83* 14.28*   HEMOGLOBIN g/dL 14.2 15.0   HEMATOCRIT % 40.2 41.9   PLATELETS 10*3/mm3 301 285     Results from last 7 days   Lab Units 04/15/21  0407 04/14/21  1831   SODIUM mmol/L 141 139   POTASSIUM mmol/L 3.8 3.8   CHLORIDE mmol/L 101 100   CO2 mmol/L 25.3 27.3   BUN mg/dL 12 13   CREATININE mg/dL 0.76 0.82   CALCIUM mg/dL 9.7 9.9   BILIRUBIN mg/dL 0.7 0.4   ALK PHOS U/L 77 79   ALT (SGPT) U/L 17 20   AST (SGOT) U/L 15 15   GLUCOSE mg/dL 207* 214*     Results from last 7 days   Lab Units 04/15/21  0407   INR  0.93     Lab Results   Lab Value Date/Time    LIPASE 23 04/14/2021 1831    LIPASE 12 (L) 03/20/2021 1834       Radiology:  No orders to display       Assessment/Plan   Patient Active Problem List   Diagnosis   • Type 1 diabetes mellitus with hyperglycemia (CMS/HCC)   • Fecal incontinence   • Erectile dysfunction   • Anxiety and depression   • Hyperplastic colonic polyp   • Gastroparesis   • Family history of colorectal cancer   • Sepsis without acute organ dysfunction (CMS/HCC)   • Alcohol abuse   • Tobacco abuse   • Acute upper GI bleed       Assessment:  1. Hematemesis  2. Abdominal pain  3. Nausea with vomiting  4. Type 1 diabetes  5. GERD  6. Gastroparesis    Plan:  · Twice daily PPI  · We will plan for EGD for further evaluation  of his abdominal pain nausea vomiting and hematemesis.  Thankfully hemoglobin is normal but I suspect that he is dehydrated and will drop with fluid resuscitation.  We will follow closely  · Continue IV fluids, n.p.o., antiemetics  · We will add Reglan given history of gastroparesis and diabetes      I discussed the patients findings and my recommendations with patient.    Lena Aguayo MD

## 2021-04-15 NOTE — ED NOTES
Pt presents to ED room 16 c/o vomiting blood and abdominal pain. Pt presents to room c bag of coffee ground/dark red blood. Pt is anxious and in NAD at this time. Pt c  family at bedside.     Patient was placed in face mask during first look triage.  Patient was wearing a face mask throughout encounter.  I wore personal protective equipment throughout the encounter.  Hand hygiene was performed before and after patient encounter.      Robson Stewart, RN  04/15/21 0403

## 2021-04-16 ENCOUNTER — BULK ORDERING (OUTPATIENT)
Dept: CASE MANAGEMENT | Facility: OTHER | Age: 31
End: 2021-04-16

## 2021-04-16 DIAGNOSIS — Z23 IMMUNIZATION DUE: ICD-10-CM

## 2021-04-16 LAB
ANION GAP SERPL CALCULATED.3IONS-SCNC: 14.1 MMOL/L (ref 5–15)
BASOPHILS # BLD AUTO: 0.03 10*3/MM3 (ref 0–0.2)
BASOPHILS NFR BLD AUTO: 0.3 % (ref 0–1.5)
BUN SERPL-MCNC: 7 MG/DL (ref 6–20)
BUN/CREAT SERPL: 10.3 (ref 7–25)
CALCIUM SPEC-SCNC: 8.4 MG/DL (ref 8.6–10.5)
CHLORIDE SERPL-SCNC: 103 MMOL/L (ref 98–107)
CO2 SERPL-SCNC: 24.9 MMOL/L (ref 22–29)
CREAT SERPL-MCNC: 0.68 MG/DL (ref 0.76–1.27)
CYTO UR: NORMAL
DEPRECATED RDW RBC AUTO: 46.7 FL (ref 37–54)
EOSINOPHIL # BLD AUTO: 0.03 10*3/MM3 (ref 0–0.4)
EOSINOPHIL NFR BLD AUTO: 0.3 % (ref 0.3–6.2)
ERYTHROCYTE [DISTWIDTH] IN BLOOD BY AUTOMATED COUNT: 13.6 % (ref 12.3–15.4)
GFR SERPL CREATININE-BSD FRML MDRD: 137 ML/MIN/1.73
GLUCOSE BLDC GLUCOMTR-MCNC: 115 MG/DL (ref 70–130)
GLUCOSE BLDC GLUCOMTR-MCNC: 139 MG/DL (ref 70–130)
GLUCOSE BLDC GLUCOMTR-MCNC: 158 MG/DL (ref 70–130)
GLUCOSE BLDC GLUCOMTR-MCNC: 191 MG/DL (ref 70–130)
GLUCOSE SERPL-MCNC: 133 MG/DL (ref 65–99)
HCT VFR BLD AUTO: 37.3 % (ref 37.5–51)
HCT VFR BLD AUTO: 38.4 % (ref 37.5–51)
HCT VFR BLD AUTO: 39.1 % (ref 37.5–51)
HGB BLD-MCNC: 12.4 G/DL (ref 13–17.7)
HGB BLD-MCNC: 12.8 G/DL (ref 13–17.7)
HGB BLD-MCNC: 13 G/DL (ref 13–17.7)
IMM GRANULOCYTES # BLD AUTO: 0.02 10*3/MM3 (ref 0–0.05)
IMM GRANULOCYTES NFR BLD AUTO: 0.2 % (ref 0–0.5)
LAB AP CASE REPORT: NORMAL
LAB AP DIAGNOSIS COMMENT: NORMAL
LYMPHOCYTES # BLD AUTO: 2.59 10*3/MM3 (ref 0.7–3.1)
LYMPHOCYTES NFR BLD AUTO: 28.5 % (ref 19.6–45.3)
MCH RBC QN AUTO: 30.6 PG (ref 26.6–33)
MCHC RBC AUTO-ENTMCNC: 33.2 G/DL (ref 31.5–35.7)
MCV RBC AUTO: 92 FL (ref 79–97)
MONOCYTES # BLD AUTO: 0.86 10*3/MM3 (ref 0.1–0.9)
MONOCYTES NFR BLD AUTO: 9.5 % (ref 5–12)
NEUTROPHILS NFR BLD AUTO: 5.56 10*3/MM3 (ref 1.7–7)
NEUTROPHILS NFR BLD AUTO: 61.2 % (ref 42.7–76)
NRBC BLD AUTO-RTO: 0 /100 WBC (ref 0–0.2)
PATH REPORT.FINAL DX SPEC: NORMAL
PATH REPORT.GROSS SPEC: NORMAL
PLATELET # BLD AUTO: 294 10*3/MM3 (ref 140–450)
PMV BLD AUTO: 10.8 FL (ref 6–12)
POTASSIUM SERPL-SCNC: 3.4 MMOL/L (ref 3.5–5.2)
RBC # BLD AUTO: 4.25 10*6/MM3 (ref 4.14–5.8)
SODIUM SERPL-SCNC: 142 MMOL/L (ref 136–145)
UREASE TISS QL: NEGATIVE
WBC # BLD AUTO: 9.09 10*3/MM3 (ref 3.4–10.8)

## 2021-04-16 PROCEDURE — 25010000002 ONDANSETRON PER 1 MG: Performed by: INTERNAL MEDICINE

## 2021-04-16 PROCEDURE — 82962 GLUCOSE BLOOD TEST: CPT

## 2021-04-16 PROCEDURE — 25010000002 METOCLOPRAMIDE PER 10 MG: Performed by: INTERNAL MEDICINE

## 2021-04-16 PROCEDURE — 99232 SBSQ HOSP IP/OBS MODERATE 35: CPT | Performed by: NURSE PRACTITIONER

## 2021-04-16 PROCEDURE — 85025 COMPLETE CBC W/AUTO DIFF WBC: CPT | Performed by: HOSPITALIST

## 2021-04-16 PROCEDURE — 85014 HEMATOCRIT: CPT | Performed by: NURSE PRACTITIONER

## 2021-04-16 PROCEDURE — 86255 FLUORESCENT ANTIBODY SCREEN: CPT | Performed by: INTERNAL MEDICINE

## 2021-04-16 PROCEDURE — 80048 BASIC METABOLIC PNL TOTAL CA: CPT | Performed by: HOSPITALIST

## 2021-04-16 PROCEDURE — 25010000002 HYDROMORPHONE PER 4 MG: Performed by: HOSPITALIST

## 2021-04-16 PROCEDURE — 83516 IMMUNOASSAY NONANTIBODY: CPT | Performed by: INTERNAL MEDICINE

## 2021-04-16 PROCEDURE — 85018 HEMOGLOBIN: CPT | Performed by: NURSE PRACTITIONER

## 2021-04-16 PROCEDURE — 25010000002 ONDANSETRON PER 1 MG: Performed by: NURSE PRACTITIONER

## 2021-04-16 PROCEDURE — 82784 ASSAY IGA/IGD/IGG/IGM EACH: CPT | Performed by: INTERNAL MEDICINE

## 2021-04-16 RX ORDER — PANTOPRAZOLE SODIUM 40 MG/1
40 TABLET, DELAYED RELEASE ORAL
Status: DISCONTINUED | OUTPATIENT
Start: 2021-04-16 | End: 2021-04-17 | Stop reason: HOSPADM

## 2021-04-16 RX ORDER — SUCRALFATE 1 G/1
1 TABLET ORAL 2 TIMES DAILY
Status: DISCONTINUED | OUTPATIENT
Start: 2021-04-16 | End: 2021-04-17 | Stop reason: HOSPADM

## 2021-04-16 RX ORDER — ONDANSETRON 2 MG/ML
4 INJECTION INTRAMUSCULAR; INTRAVENOUS EVERY 4 HOURS PRN
Status: DISCONTINUED | OUTPATIENT
Start: 2021-04-16 | End: 2021-04-17 | Stop reason: HOSPADM

## 2021-04-16 RX ORDER — UREA 10 %
3 LOTION (ML) TOPICAL NIGHTLY PRN
Status: DISCONTINUED | OUTPATIENT
Start: 2021-04-16 | End: 2021-04-17 | Stop reason: HOSPADM

## 2021-04-16 RX ADMIN — SODIUM CHLORIDE 100 ML/HR: 9 INJECTION, SOLUTION INTRAVENOUS at 11:34

## 2021-04-16 RX ADMIN — METOCLOPRAMIDE HYDROCHLORIDE 10 MG: 5 INJECTION INTRAMUSCULAR; INTRAVENOUS at 14:16

## 2021-04-16 RX ADMIN — ONDANSETRON 4 MG: 2 INJECTION INTRAMUSCULAR; INTRAVENOUS at 12:54

## 2021-04-16 RX ADMIN — HYDROMORPHONE HYDROCHLORIDE 0.5 MG: 1 INJECTION, SOLUTION INTRAMUSCULAR; INTRAVENOUS; SUBCUTANEOUS at 21:06

## 2021-04-16 RX ADMIN — SODIUM CHLORIDE 100 ML/HR: 9 INJECTION, SOLUTION INTRAVENOUS at 00:12

## 2021-04-16 RX ADMIN — SODIUM CHLORIDE, PRESERVATIVE FREE 10 ML: 5 INJECTION INTRAVENOUS at 08:13

## 2021-04-16 RX ADMIN — SUCRALFATE 1 G: 1 TABLET ORAL at 21:00

## 2021-04-16 RX ADMIN — ONDANSETRON 4 MG: 2 INJECTION INTRAMUSCULAR; INTRAVENOUS at 08:13

## 2021-04-16 RX ADMIN — SUCRALFATE 1 G: 1 TABLET ORAL at 11:34

## 2021-04-16 RX ADMIN — PANTOPRAZOLE SODIUM 40 MG: 40 TABLET, DELAYED RELEASE ORAL at 06:07

## 2021-04-16 RX ADMIN — Medication 3 MG: at 01:10

## 2021-04-16 RX ADMIN — METOCLOPRAMIDE HYDROCHLORIDE 10 MG: 5 INJECTION INTRAMUSCULAR; INTRAVENOUS at 08:16

## 2021-04-16 RX ADMIN — METOCLOPRAMIDE HYDROCHLORIDE 10 MG: 5 INJECTION INTRAMUSCULAR; INTRAVENOUS at 02:27

## 2021-04-16 RX ADMIN — SODIUM CHLORIDE 100 ML/HR: 9 INJECTION, SOLUTION INTRAVENOUS at 22:01

## 2021-04-16 RX ADMIN — METOCLOPRAMIDE HYDROCHLORIDE 10 MG: 5 INJECTION INTRAMUSCULAR; INTRAVENOUS at 21:00

## 2021-04-16 RX ADMIN — PANTOPRAZOLE SODIUM 40 MG: 40 TABLET, DELAYED RELEASE ORAL at 16:32

## 2021-04-16 RX ADMIN — SODIUM CHLORIDE, PRESERVATIVE FREE 10 ML: 5 INJECTION INTRAVENOUS at 21:00

## 2021-04-16 NOTE — PLAN OF CARE
Goal Outcome Evaluation:     29 yo male admitted 4/15 post EGD r/t abd pain and bloody emesis. VS stable, room air, A&Ox4. Pt complains of no pain overnight, no N/V. Resting comfortably. Uses own insulin pump. Hgb stable.

## 2021-04-16 NOTE — PROGRESS NOTES
Gastroenterology   Inpatient Progress Note    Reason for Follow Up:  Nausea, vomiting, abdominal pain    Subjective  Interval History:   Patient reports improvement in pain.  It was an 8 out of 10 and is currently a 4 out of 10.  Pain is located in his upper mid abdomen.  Pain does not radiate.  He has noticed some nausea when he was drinking water but no vomiting.    EGD yesterday with esophagitis, gastric erosions and abnormal small bowel, biopsies are pending.  He was on Dexilant at home for acid reflux and is currently on pantoprazole 40 mg once daily.      Current Facility-Administered Medications:   •  acetaminophen (TYLENOL) tablet 650 mg, 650 mg, Oral, Q4H PRN **OR** acetaminophen (TYLENOL) 160 MG/5ML solution 650 mg, 650 mg, Oral, Q4H PRN **OR** acetaminophen (TYLENOL) suppository 650 mg, 650 mg, Rectal, Q4H PRN, Anupama Jones, YUDELKA  •  dextrose (D50W) 25 g/ 50mL Intravenous Solution 25 g, 25 g, Intravenous, Q15 Min PRN, Anupama Jones APRN  •  dextrose (GLUTOSE) oral gel 15 g, 15 g, Oral, Q15 Min PRN, Anupama Jones, APRN  •  glucagon (human recombinant) (GLUCAGEN DIAGNOSTIC) injection 1 mg, 1 mg, Subcutaneous, Q15 Min PRN, Anupama Jones, APRN  •  HYDROmorphone (DILAUDID) injection 0.5 mg, 0.5 mg, Intravenous, Q2H PRN, Yonatan Benavides MD, 0.5 mg at 04/15/21 1600  •  insulin lispro (ADMELOG) injection 0-7 Units, 0-7 Units, Subcutaneous, TID AC, Anupama Jones APRN  •  insulin patient supplied pump, , Subcutaneous, Continuous, Yonatan Benavides MD, Self Administered Via Pump at 04/15/21 1652  •  melatonin tablet 3 mg, 3 mg, Oral, Nightly PRN, Yonatan eBnavides MD, 3 mg at 04/16/21 0110  •  metoclopramide (REGLAN) injection 10 mg, 10 mg, Intravenous, Q6H, Lena Aguayo MD, 10 mg at 04/16/21 0816  •  nitroglycerin (NITROSTAT) SL tablet 0.4 mg, 0.4 mg, Sublingual, Q5 Min PRN, Anupama Jones APRN  •  ondansetron (ZOFRAN) injection 4 mg, 4 mg, Intravenous, Q6H PRN, Robert,  YUDELKA Tyler, 4 mg at 04/16/21 0813  •  pantoprazole (PROTONIX) EC tablet 40 mg, 40 mg, Oral, Q AM, Da Fleming MD, 40 mg at 04/16/21 0607  •  [COMPLETED] Insert peripheral IV, , , Once **AND** sodium chloride 0.9 % flush 10 mL, 10 mL, Intravenous, PRN, Virgilio Toscano MD  •  sodium chloride 0.9 % flush 10 mL, 10 mL, Intravenous, Q12H, Anupama Jones APRN, 10 mL at 04/16/21 0813  •  sodium chloride 0.9 % flush 10 mL, 10 mL, Intravenous, PRN, Anupama Jones APRN, 10 mL at 04/15/21 0606  •  sodium chloride 0.9 % infusion, 100 mL/hr, Intravenous, Continuous, Anupama Jones APRN, Last Rate: 100 mL/hr at 04/16/21 0012, 100 mL/hr at 04/16/21 0012  Review of Systems:               Gastroenterology positive for abdominal pain and nausea    Objective     Vital Signs  Temp:  [98.2 °F (36.8 °C)-98.7 °F (37.1 °C)] 98.4 °F (36.9 °C)  Heart Rate:  [] 95  Resp:  [16-22] 16  BP: (130-147)/() 135/90  Body mass index is 19.08 kg/m².                  Physical Exam:              General: patient awake, alert and cooperative              Eyes: no scleral icterus              Skin: warm and dry, not jaundiced              Abdomen: soft, diffuse abdominal tenderness with light palpation, no guarding or rebound tenderness noted, normal bowel sounds.              Psychiatric: Appropriate affect and behavior                Results Review:                I reviewed the patient's new clinical results.    Results from last 7 days   Lab Units 04/15/21  2354 04/15/21  1601 04/15/21  1418 04/15/21  0407 04/14/21  1831   WBC 10*3/mm3  --   --   --  13.83* 14.28*   HEMOGLOBIN g/dL 12.8* 12.9* 12.8* 14.2 15.0   HEMATOCRIT % 38.4 38.4 36.2* 40.2 41.9   PLATELETS 10*3/mm3  --   --   --  301 285     Results from last 7 days   Lab Units 04/15/21  0407 04/14/21  1831   SODIUM mmol/L 141 139   POTASSIUM mmol/L 3.8 3.8   CHLORIDE mmol/L 101 100   CO2 mmol/L 25.3 27.3   BUN mg/dL 12 13   CREATININE mg/dL 0.76  0.82   CALCIUM mg/dL 9.7 9.9   BILIRUBIN mg/dL 0.7 0.4   ALK PHOS U/L 77 79   ALT (SGPT) U/L 17 20   AST (SGOT) U/L 15 15   GLUCOSE mg/dL 207* 214*     Results from last 7 days   Lab Units 04/15/21  0407   INR  0.93     Lab Results   Lab Value Date/Time    LIPASE 23 04/14/2021 1831    LIPASE 12 (L) 03/20/2021 1834       Radiology:  No orders to display       Assessment/Plan     Patient Active Problem List   Diagnosis   • Type 1 diabetes mellitus with hyperglycemia (CMS/HCC)   • Fecal incontinence   • Erectile dysfunction   • Anxiety and depression   • Hyperplastic colonic polyp   • Gastroparesis   • Family history of colorectal cancer   • Sepsis without acute organ dysfunction (CMS/HCC)   • Alcohol abuse   • Tobacco abuse   • Acute upper GI bleed       Assessment:  1. Esophagitis, gastric erosions and abnormal small bowel appearance on recent EGD  2. Nausea  3. Gastroparesis  4. Abdominal pain  5. Acid reflux  6. Type 1 diabetes      Plan:  · Follow-up on recent EGD biopsies, pending at this time  · For gastroparesis, continue metoclopramide, started yesterday  · Continue antiemetics as needed  · Recommend smaller more frequent meals and soft diet, encouraged oral intake as tolerated.  · Will increase PPI to twice daily as he was on Dexilant prior to admission.  · Will add sucralfate 1 tablet twice daily to coat the lining of stomach as well.    I discussed the patients findings and my recommendations with patient and nursing staff.           Padmini JHA  [facility] [place] [address]  Office: [phone]

## 2021-04-16 NOTE — PROGRESS NOTES
Discharge Planning Assessment  [facility]     Patient Name: Morro Blancas  MRN: [id number]  Today's Date: 4/16/2021    Admit Date: 4/15/2021    Discharge Needs Assessment     Row Name 04/16/21 1650       Living Environment    Lives With  significant other    Name(s) of Who Lives With Patient  significant other Mahsa Lee [phone]    Current Living Arrangements  home/apartment/condo    Primary Care Provided by  self;spouse/significant other    Provides Primary Care For  no one, unable/limited ability to care for self    Family Caregiver if Needed  significant other    Family Caregiver Names  significant other Mahsa Lee [phone]    Quality of Family Relationships  helpful;involved;supportive    Able to Return to Prior Arrangements  yes       Resource/Environmental Concerns    Resource/Environmental Concerns  none    Transportation Concerns  car, none       Transition Planning    Patient/Family Anticipates Transition to  home with family    Patient/Family Anticipated Services at Transition  none    Transportation Anticipated  family or friend will provide       Discharge Needs Assessment    Equipment Currently Used at Home  none    Anticipated Changes Related to Illness  none    Equipment Needed After Discharge  none    Current Discharge Risk  physical impairment        Discharge Plan     Row Name 04/16/21 1589       Plan    Plan  Plans home; denies needs.    Provided Post Acute Provider List?  N/A    N/A Provider List Comment  The patient was not provided with a HH/SNF list nor a print out of the HH/nursing home compare list from Medicare.gov as the patient currently denies any d/c needs.    Provided Post Acute Provider Quality & Resource List?  N/A    N/A Quality & Resource List Comment  The patient was not provided with a HH/SNF list nor a print out of the HH/nursing home compare list from Medicare.gov as the patient currently denies any d/c needs.    Patient/Family in Agreement with Plan  yes    Plan  Comments  Spoke to the patient; explained role of CCP, verified facesheet and discussed discharge planning needs.  The patient plans to return home upon d/c with assistance from his significant other Mahsa Lee [phone].  The patient uses no DME, has no trouble remembering to take his medication or with affording his medication.  The patient’s PCP is Gale Banegas, pharmacy is [facility] on [address] and the patient denies any trouble remembering to take his medication or with affording his medication.  The patient denies any HH/SNF history, denies any POA documents, states that his significant other transports him to his appointments and will transport him home upon d/c.  The patient was not provided with a HH/SNF list nor a print out of the HH/nursing home compare list from Medicare.gov as the patient currently denies any d/c needs.  The patient states that he uses a Dexcom and has an insulin pump.  CCP will follow to assist with any needs that may arise.  BRITANY Contreras        Continued Care and Services - Admitted Since 4/15/2021    Coordination has not been started for this encounter.         Demographic Summary     Row Name 04/16/21 1656       General Information    Admission Type  inpatient    Arrived From  home    Referral Source  admission list    Reason for Consult  discharge planning    Preferred Language  English     Used During This Interaction  no        Functional Status     Row Name 04/16/21 9499       Functional Status    Usual Activity Tolerance  good    Current Activity Tolerance  moderate       Functional Status, IADL    Medications  independent    Meal Preparation  independent    Housekeeping  independent    Laundry  independent    Shopping  independent       Mental Status Summary    Recent Changes in Mental Status/Cognitive Functioning  no changes        Psychosocial    No documentation.       Abuse/Neglect    No documentation.       Legal    No documentation.        Substance Abuse    No documentation.       Patient Forms    No documentation.           BRITANY Cohen

## 2021-04-16 NOTE — PROGRESS NOTES
Pathology has returned.  While not classic features of celiac there are some features that are consistent with celiac.  Given the ongoing nature of his symptoms we will get serologies and consider a trial of gluten-free diet

## 2021-04-16 NOTE — PROGRESS NOTES
"DAILY PROGRESS NOTE  [facility]    Patient Identification:  Name: Morro Blancas  Age: 30 y.o.  Sex: male  :  1990  MRN: [id number]         Primary Care Physician: Gale Banegas APRN    Subjective:  Interval History:He vomited.  Pain better.    Objective:    Scheduled Meds:insulin lispro, 0-7 Units, Subcutaneous, TID AC  metoclopramide, 10 mg, Intravenous, Q6H  pantoprazole, 40 mg, Oral, BID AC  sodium chloride, 10 mL, Intravenous, Q12H  sucralfate, 1 g, Oral, BID      Continuous Infusions:insulin,   sodium chloride, 100 mL/hr, Last Rate: 100 mL/hr (21 1134)        Vital signs in last 24 hours:  Temp:  [98.2 °F (36.8 °C)-98.7 °F (37.1 °C)] 98.4 °F (36.9 °C)  Heart Rate:  [] 95  Resp:  [16-18] 16  BP: (135-140)/() 135/90    Intake/Output:    Intake/Output Summary (Last 24 hours) at 2021 1340  Last data filed at 2021 0503  Gross per 24 hour   Intake 1080 ml   Output --   Net 1080 ml       Exam:  /90 (BP Location: Left arm, Patient Position: Lying)   Pulse 95   Temp 98.4 °F (36.9 °C) (Oral)   Resp 16   Ht 177.8 cm (70\")   Wt 60.3 kg (133 lb)   SpO2 98%   BMI 19.08 kg/m²     General Appearance:    Alert, cooperative, no distress   Head:    Normocephalic, without obvious abnormality, atraumatic   Eyes:       Throat:   Lips, tongue, gums normal   Neck:   Supple, symmetrical, trachea midline, no JVD   Lungs:     Clear to auscultation bilaterally, respirations unlabored   Chest Wall:    No tenderness or deformity    Heart:    Regular rate and rhythm, S1 and S2 normal, no murmur,no  Rub or gallop   Abdomen:     Soft, mild epigastric tenderness, bowel sounds active, no masses, no organomegaly    Extremities:   Extremities normal, atraumatic, no cyanosis or edema   Pulses:      Skin:   Skin is warm and dry,  no rashes or palpable lesions   Neurologic:   no focal deficits noted      Lab Results (last 72 hours)     Procedure Component Value Units Date/Time    " Celiac Disease Panel [[id number]] Collected: 04/16/21 1307    Specimen: Blood from Arm, Left Updated: 04/16/21 1327    Tissue Pathology Exam [[id number]] Collected: 04/15/21 1303    Specimen: Tissue from Small Intestine; Tissue from Gastric, Body Updated: 04/16/21 1124     Case Report --     Surgical Pathology Report                         Case: [id number]                                  Authorizing Provider:  Da Fleming MD      Collected:           04/15/2021 01:03 PM          Ordering Location:     [facility]  Received:            04/15/2021 01:46 PM                                 ENDO SUITES                                                                  Pathologist:           Terry Canales MD                                                         Specimens:   1) - Small Intestine, changes of celiac                                                             2) - Gastric, Body, gastric erosion                                                         Final Diagnosis --     1. Small Intestinal Biopsy: Benign small intestinal mucosa with    A. Relatively normal villous architecture with minimally increased intraepithelial lymphocytes (see comment).   B. No active inflammation, granulomatous inflammation nor intestinal parasites identified.    2. Gastric Erosions Biopsy: Benign gastric mucosa with    A. Minimal chronic inflammation.   B. No H. pylori-like organisms identified by routine staining.   C. Focal reparative change noted, no dysplasia nor malignancy identified.    jat/daniela        Comment --     The biopsy from part 1 (clinically small intestine) shows normal villous architecture with minimally increased intraepithelial lymphocytes.  Rare lymphocytes are noted at the tips of villi.  The observed changes are subtle and relatively nonspecific, but could be seen with developing malabsorption syndrome including celiac disease.  Correlation with serology would be necessary to exclude  celiac disease if clinically indicated.      jat/daniela        Gross Description --     1. The specimen is received in formalin labeled with the patient's name and further designated 'small intestine changes of celiac biopsy' are multiple small fragments of gray-tan tissue. The specimen is submitted for embedding as received.    2. The specimen is received in formalin labeled with the patient's name and further designated 'gastric erosion biopsy' is a small fragment of gray-tan tissue. The specimen is submitted for embedding as received.           Microscopic Description --     Performed, incorporated in diagnosis.       POC Glucose Once [[id number]]  (Abnormal) Collected: 04/16/21 1053    Specimen: Blood Updated: 04/16/21 1055     Glucose 158 mg/dL     CBC & Differential [[id number]]  (Normal) Collected: 04/16/21 0749    Specimen: Blood from Arm, Left Updated: 04/16/21 0919    Narrative:      The following orders were created for panel order CBC & Differential.  Procedure                               Abnormality         Status                     ---------                               -----------         ------                     CBC Auto Differential[[id number]]        Normal              Final result                 Please view results for these tests on the individual orders.    CBC Auto Differential [[id number]]  (Normal) Collected: 04/16/21 0749    Specimen: Blood from Arm, Left Updated: 04/16/21 0919     WBC 9.09 10*3/mm3      RBC 4.25 10*6/mm3      Hemoglobin 13.0 g/dL      Hematocrit 39.1 %      MCV 92.0 fL      MCH 30.6 pg      MCHC 33.2 g/dL      RDW 13.6 %      RDW-SD 46.7 fl      MPV 10.8 fL      Platelets 294 10*3/mm3      Neutrophil % 61.2 %      Lymphocyte % 28.5 %      Monocyte % 9.5 %      Eosinophil % 0.3 %      Basophil % 0.3 %      Immature Grans % 0.2 %      Neutrophils, Absolute 5.56 10*3/mm3      Lymphocytes, Absolute 2.59 10*3/mm3      Monocytes, Absolute 0.86 10*3/mm3      Eosinophils, Absolute  0.03 10*3/mm3      Basophils, Absolute 0.03 10*3/mm3      Immature Grans, Absolute 0.02 10*3/mm3      nRBC 0.0 /100 WBC     Basic Metabolic Panel [[id number]]  (Abnormal) Collected: 04/16/21 0749    Specimen: Blood from Arm, Left Updated: 04/16/21 0919     Glucose 133 mg/dL      BUN 7 mg/dL      Creatinine 0.68 mg/dL      Sodium 142 mmol/L      Potassium 3.4 mmol/L      Chloride 103 mmol/L      CO2 24.9 mmol/L      Calcium 8.4 mg/dL      eGFR Non African Amer 137 mL/min/1.73      BUN/Creatinine Ratio 10.3     Anion Gap 14.1 mmol/L     Narrative:      GFR Normal >60  Chronic Kidney Disease <60  Kidney Failure <15      POC Glucose Once [[id number]]  (Normal) Collected: 04/16/21 0731    Specimen: Blood Updated: 04/16/21 0733     Glucose 115 mg/dL     Hemoglobin & Hematocrit, Blood [[id number]]  (Abnormal) Collected: 04/15/21 2354    Specimen: Blood Updated: 04/16/21 0034     Hemoglobin 12.8 g/dL      Hematocrit 38.4 %     POC Glucose Once [[id number]]  (Abnormal) Collected: 04/15/21 2032    Specimen: Blood Updated: 04/15/21 2034     Glucose 161 mg/dL     Fungus Smear - Brushing, Esophagus [[id number]] Collected: 04/15/21 1306    Specimen: Brushing from Esophagus Updated: 04/15/21 1833     Fungal Stain No fungal elements seen    POC Glucose Once [[id number]]  (Normal) Collected: 04/15/21 1705    Specimen: Blood Updated: 04/15/21 1707     Glucose 119 mg/dL     Hemoglobin & Hematocrit, Blood [[id number]]  (Abnormal) Collected: 04/15/21 1601    Specimen: Blood Updated: 04/15/21 1636     Hemoglobin 12.9 g/dL      Hematocrit 38.4 %     Fungus Culture - Brushing, Esophagus [[id number]] Collected: 04/15/21 1306    Specimen: Brushing from Esophagus Updated: 04/15/21 1527    Hemoglobin & Hematocrit, Blood [[id number]]  (Abnormal) Collected: 04/15/21 1418    Specimen: Blood Updated: 04/15/21 1522     Hemoglobin 12.8 g/dL      Hematocrit 36.2 %     Urease For H Pylori - Tissue, Gastric, Antrum [[id number]] Collected: 04/15/21 1278     Specimen: Tissue from Gastric, Antrum Updated: 04/15/21 1327    COVID PRE-OP / PRE-PROCEDURE SCREENING ORDER (NO ISOLATION) - Swab, Nasopharynx [[id number]]  (Normal) Collected: 04/15/21 0543    Specimen: Swab from Nasopharynx Updated: 04/15/21 1146    Narrative:      The following orders were created for panel order COVID PRE-OP / PRE-PROCEDURE SCREENING ORDER (NO ISOLATION) - Swab, Nasopharynx.  Procedure                               Abnormality         Status                     ---------                               -----------         ------                     COVID-19,APTIMA PANTHER,...[[id number]]  Normal              Final result                 Please view results for these tests on the individual orders.    COVID-19,APTIMA PANTHER,[facility] IN-HOUSE, NP/OP SWAB IN UTM/VTM/[facility]INE TRANSPORT MEDIA,24 HR TAT - Swab, Nasopharynx [[id number]]  (Normal) Collected: 04/15/21 0543    Specimen: Swab from Nasopharynx Updated: 04/15/21 1146     COVID19 Not Detected    Narrative:      Fact sheet for providers: https://www.fda.gov/media/039996/download     Fact sheet for patients: https://www.fda.gov/media/627331/download    Test performed by RT PCR.    POC Glucose Once [[id number]]  (Abnormal) Collected: 04/15/21 0803    Specimen: Blood Updated: 04/15/21 0805     Glucose 169 mg/dL     Protime-INR [[id number]]  (Normal) Collected: 04/15/21 0407    Specimen: Blood Updated: 04/15/21 0456     Protime 12.3 Seconds      INR 0.93    aPTT [[id number]]  (Abnormal) Collected: 04/15/21 0407    Specimen: Blood Updated: 04/15/21 0456     PTT 22.1 seconds     Comprehensive Metabolic Panel [[id number]]  (Abnormal) Collected: 04/15/21 0407    Specimen: Blood Updated: 04/15/21 0432     Glucose 207 mg/dL      BUN 12 mg/dL      Creatinine 0.76 mg/dL      Sodium 141 mmol/L      Potassium 3.8 mmol/L      Comment: Slight hemolysis detected by analyzer. Results may be affected.        Chloride 101 mmol/L      CO2 25.3 mmol/L      Calcium 9.7 mg/dL       Total Protein 7.3 g/dL      Albumin 4.50 g/dL      ALT (SGPT) 17 U/L      AST (SGOT) 15 U/L      Alkaline Phosphatase 77 U/L      Total Bilirubin 0.7 mg/dL      eGFR Non African Amer 120 mL/min/1.73      Globulin 2.8 gm/dL      A/G Ratio 1.6 g/dL      BUN/Creatinine Ratio 15.8     Anion Gap 14.7 mmol/L     Narrative:      GFR Normal >60  Chronic Kidney Disease <60  Kidney Failure <15      CBC & Differential [[id number]]  (Abnormal) Collected: 04/15/21 0407    Specimen: Blood Updated: 04/15/21 0422    Narrative:      The following orders were created for panel order CBC & Differential.  Procedure                               Abnormality         Status                     ---------                               -----------         ------                     CBC Auto Differential[[id number]]        Abnormal            Final result                 Please view results for these tests on the individual orders.    CBC Auto Differential [[id number]]  (Abnormal) Collected: 04/15/21 0407    Specimen: Blood Updated: 04/15/21 0422     WBC 13.83 10*3/mm3      RBC 4.45 10*6/mm3      Hemoglobin 14.2 g/dL      Hematocrit 40.2 %      MCV 90.3 fL      MCH 31.9 pg      MCHC 35.3 g/dL      RDW 13.4 %      RDW-SD 44.7 fl      MPV 10.8 fL      Platelets 301 10*3/mm3      Neutrophil % 77.7 %      Lymphocyte % 16.5 %      Monocyte % 5.1 %      Eosinophil % 0.1 %      Basophil % 0.3 %      Immature Grans % 0.3 %      Neutrophils, Absolute 10.75 10*3/mm3      Lymphocytes, Absolute 2.28 10*3/mm3      Monocytes, Absolute 0.71 10*3/mm3      Eosinophils, Absolute 0.01 10*3/mm3      Basophils, Absolute 0.04 10*3/mm3      Immature Grans, Absolute 0.04 10*3/mm3      nRBC 0.0 /100 WBC         Data Review:  Results from last 7 days   Lab Units 04/16/21  0749 04/15/21  0407 04/14/21  1831   SODIUM mmol/L 142 141 139   POTASSIUM mmol/L 3.4* 3.8 3.8   CHLORIDE mmol/L 103 101 100   CO2 mmol/L 24.9 25.3 27.3   BUN mg/dL 7 12 13   CREATININE mg/dL 0.68* 0.76  0.82   GLUCOSE mg/dL 133* 207* 214*   CALCIUM mg/dL 8.4* 9.7 9.9     Results from last 7 days   Lab Units 04/16/21  0749 04/15/21  2354 04/15/21  1601 04/15/21  0407 04/14/21  1831   WBC 10*3/mm3 9.09  --   --  13.83* 14.28*   HEMOGLOBIN g/dL 13.0 12.8* 12.9* 14.2 15.0   HEMATOCRIT % 39.1 38.4 38.4 40.2 41.9   PLATELETS 10*3/mm3 294  --   --  301 285             Lab Results   Lab Value Date/Time    TROPONINT <0.010 03/20/2021 1834         Results from last 7 days   Lab Units 04/15/21  0407 04/14/21  1831   ALK PHOS U/L 77 79   BILIRUBIN mg/dL 0.7 0.4   ALT (SGPT) U/L 17 20   AST (SGOT) U/L 15 15             Glucose   Date/Time Value Ref Range Status   04/16/2021 1053 158 (H) 70 - 130 mg/dL Final   04/16/2021 0731 115 70 - 130 mg/dL Final   04/15/2021 2032 161 (H) 70 - 130 mg/dL Final   04/15/2021 1705 119 70 - 130 mg/dL Final   04/15/2021 0803 169 (H) 70 - 130 mg/dL Final     Results from last 7 days   Lab Units 04/15/21  0407   INR  0.93       Past Medical History:   Diagnosis Date   • Anxiety    • Anxiety and depression    • Colon polyp 8/30/2017    1   • Depression    • Diabetes type 1, uncontrolled (CMS/HCC)     diagnosed at age 17   • Dyspepsia    • Family hx of colon cancer    • Gastroparesis    • GERD (gastroesophageal reflux disease)        Assessment:  Active Hospital Problems    Diagnosis  POA   • **Acute upper GI bleed [K92.2]  Yes   • Alcohol abuse [F10.10]  Yes   • Tobacco abuse [Z72.0]  Yes   • Gastroparesis [K31.84]  Yes   • Type 1 diabetes mellitus with hyperglycemia (CMS/HCC) [E10.65]  Yes   • Anxiety and depression [F41.9, F32.9]  Yes      Resolved Hospital Problems   No resolved problems to display.       Plan:  Continue with protonix and see if he can tolerate diet. Follow lab.    [place] E. Benavides, MD  4/16/2021  13:40 EDT

## 2021-04-16 NOTE — PLAN OF CARE
Problem: Adult Inpatient Plan of Care  Goal: Plan of Care Review  Outcome: Ongoing, Progressing  Flowsheets (Taken 4/16/2021 1515)  Plan of Care Reviewed With: patient  Outcome Summary: zofran given PRN, kishan some full liquids, carafate started, insulin per patients insulin pump, vss, will continue to monitor

## 2021-04-17 ENCOUNTER — READMISSION MANAGEMENT (OUTPATIENT)
Dept: CALL CENTER | Facility: HOSPITAL | Age: 31
End: 2021-04-17

## 2021-04-17 VITALS
WEIGHT: 133 LBS | OXYGEN SATURATION: 99 % | HEART RATE: 106 BPM | DIASTOLIC BLOOD PRESSURE: 90 MMHG | SYSTOLIC BLOOD PRESSURE: 130 MMHG | RESPIRATION RATE: 16 BRPM | BODY MASS INDEX: 19.04 KG/M2 | TEMPERATURE: 98.4 F | HEIGHT: 70 IN

## 2021-04-17 LAB
ANION GAP SERPL CALCULATED.3IONS-SCNC: 9.7 MMOL/L (ref 5–15)
BASOPHILS # BLD AUTO: 0.05 10*3/MM3 (ref 0–0.2)
BASOPHILS NFR BLD AUTO: 0.6 % (ref 0–1.5)
BUN SERPL-MCNC: 8 MG/DL (ref 6–20)
BUN/CREAT SERPL: 11.1 (ref 7–25)
CALCIUM SPEC-SCNC: 8.9 MG/DL (ref 8.6–10.5)
CHLORIDE SERPL-SCNC: 105 MMOL/L (ref 98–107)
CO2 SERPL-SCNC: 25.3 MMOL/L (ref 22–29)
CREAT SERPL-MCNC: 0.72 MG/DL (ref 0.76–1.27)
DEPRECATED RDW RBC AUTO: 42.7 FL (ref 37–54)
EOSINOPHIL # BLD AUTO: 0.06 10*3/MM3 (ref 0–0.4)
EOSINOPHIL NFR BLD AUTO: 0.7 % (ref 0.3–6.2)
ERYTHROCYTE [DISTWIDTH] IN BLOOD BY AUTOMATED COUNT: 13 % (ref 12.3–15.4)
GFR SERPL CREATININE-BSD FRML MDRD: 128 ML/MIN/1.73
GLUCOSE BLDC GLUCOMTR-MCNC: 133 MG/DL (ref 70–130)
GLUCOSE BLDC GLUCOMTR-MCNC: 255 MG/DL (ref 70–130)
GLUCOSE SERPL-MCNC: 128 MG/DL (ref 65–99)
HCT VFR BLD AUTO: 35.9 % (ref 37.5–51)
HCT VFR BLD AUTO: 38.2 % (ref 37.5–51)
HCT VFR BLD AUTO: 38.2 % (ref 37.5–51)
HGB BLD-MCNC: 11.8 G/DL (ref 13–17.7)
HGB BLD-MCNC: 13.2 G/DL (ref 13–17.7)
HGB BLD-MCNC: 13.2 G/DL (ref 13–17.7)
IMM GRANULOCYTES # BLD AUTO: 0.03 10*3/MM3 (ref 0–0.05)
IMM GRANULOCYTES NFR BLD AUTO: 0.4 % (ref 0–0.5)
LYMPHOCYTES # BLD AUTO: 3.07 10*3/MM3 (ref 0.7–3.1)
LYMPHOCYTES NFR BLD AUTO: 37.6 % (ref 19.6–45.3)
MCH RBC QN AUTO: 31.2 PG (ref 26.6–33)
MCHC RBC AUTO-ENTMCNC: 34.6 G/DL (ref 31.5–35.7)
MCV RBC AUTO: 90.3 FL (ref 79–97)
MONOCYTES # BLD AUTO: 0.85 10*3/MM3 (ref 0.1–0.9)
MONOCYTES NFR BLD AUTO: 10.4 % (ref 5–12)
NEUTROPHILS NFR BLD AUTO: 4.1 10*3/MM3 (ref 1.7–7)
NEUTROPHILS NFR BLD AUTO: 50.3 % (ref 42.7–76)
NRBC BLD AUTO-RTO: 0 /100 WBC (ref 0–0.2)
PLATELET # BLD AUTO: 272 10*3/MM3 (ref 140–450)
PMV BLD AUTO: 10.3 FL (ref 6–12)
POTASSIUM SERPL-SCNC: 3.4 MMOL/L (ref 3.5–5.2)
RBC # BLD AUTO: 4.23 10*6/MM3 (ref 4.14–5.8)
SODIUM SERPL-SCNC: 140 MMOL/L (ref 136–145)
WBC # BLD AUTO: 8.16 10*3/MM3 (ref 3.4–10.8)

## 2021-04-17 PROCEDURE — 85014 HEMATOCRIT: CPT | Performed by: NURSE PRACTITIONER

## 2021-04-17 PROCEDURE — 85025 COMPLETE CBC W/AUTO DIFF WBC: CPT | Performed by: HOSPITALIST

## 2021-04-17 PROCEDURE — 85018 HEMOGLOBIN: CPT | Performed by: NURSE PRACTITIONER

## 2021-04-17 PROCEDURE — 99232 SBSQ HOSP IP/OBS MODERATE 35: CPT | Performed by: INTERNAL MEDICINE

## 2021-04-17 PROCEDURE — 80048 BASIC METABOLIC PNL TOTAL CA: CPT | Performed by: HOSPITALIST

## 2021-04-17 PROCEDURE — 25010000002 METOCLOPRAMIDE PER 10 MG: Performed by: INTERNAL MEDICINE

## 2021-04-17 PROCEDURE — 82962 GLUCOSE BLOOD TEST: CPT

## 2021-04-17 PROCEDURE — 36415 COLL VENOUS BLD VENIPUNCTURE: CPT | Performed by: HOSPITALIST

## 2021-04-17 RX ORDER — SUCRALFATE 1 G/1
1 TABLET ORAL 4 TIMES DAILY
Qty: 120 TABLET | Refills: 0 | Status: SHIPPED | OUTPATIENT
Start: 2021-04-17 | End: 2021-04-28 | Stop reason: SDUPTHER

## 2021-04-17 RX ORDER — POTASSIUM CHLORIDE 750 MG/1
40 TABLET, FILM COATED, EXTENDED RELEASE ORAL ONCE
Status: COMPLETED | OUTPATIENT
Start: 2021-04-17 | End: 2021-04-17

## 2021-04-17 RX ORDER — PANTOPRAZOLE SODIUM 40 MG/1
40 TABLET, DELAYED RELEASE ORAL
Qty: 60 TABLET | Refills: 0 | Status: SHIPPED | OUTPATIENT
Start: 2021-04-17 | End: 2021-04-28 | Stop reason: SDUPTHER

## 2021-04-17 RX ADMIN — SODIUM CHLORIDE, PRESERVATIVE FREE 10 ML: 5 INJECTION INTRAVENOUS at 09:05

## 2021-04-17 RX ADMIN — METOCLOPRAMIDE HYDROCHLORIDE 10 MG: 5 INJECTION INTRAMUSCULAR; INTRAVENOUS at 09:04

## 2021-04-17 RX ADMIN — METOCLOPRAMIDE HYDROCHLORIDE 10 MG: 5 INJECTION INTRAMUSCULAR; INTRAVENOUS at 02:32

## 2021-04-17 RX ADMIN — PANTOPRAZOLE SODIUM 40 MG: 40 TABLET, DELAYED RELEASE ORAL at 05:57

## 2021-04-17 RX ADMIN — SODIUM CHLORIDE 100 ML/HR: 9 INJECTION, SOLUTION INTRAVENOUS at 09:04

## 2021-04-17 RX ADMIN — POTASSIUM CHLORIDE 40 MEQ: 750 TABLET, EXTENDED RELEASE ORAL at 15:51

## 2021-04-17 RX ADMIN — SUCRALFATE 1 G: 1 TABLET ORAL at 09:04

## 2021-04-17 NOTE — PLAN OF CARE
Goal Outcome Evaluation:     Progress: improving  Outcome Summary: VSS; tolerating full liquid well; pt c/o having chronic restless in legs, can not keep legs still while sitting, has to walk frequently to feel better, pt said he will talk to attending MD today; continue to monitor.

## 2021-04-17 NOTE — PROGRESS NOTES
Gastroenterology   Inpatient Progress Note    Reason for Follow Up:  Nausea, vomiting, abdominal pain    Subjective  Interval History:   Feels good today, ready to go home    Current Facility-Administered Medications:   •  acetaminophen (TYLENOL) tablet 650 mg, 650 mg, Oral, Q4H PRN **OR** acetaminophen (TYLENOL) 160 MG/5ML solution 650 mg, 650 mg, Oral, Q4H PRN **OR** acetaminophen (TYLENOL) suppository 650 mg, 650 mg, Rectal, Q4H PRN, Anupama Jones APRN  •  dextrose (D50W) 25 g/ 50mL Intravenous Solution 25 g, 25 g, Intravenous, Q15 Min PRN, Anupama Jones APRN  •  dextrose (GLUTOSE) oral gel 15 g, 15 g, Oral, Q15 Min PRN, Anupama Jnoes APRN  •  glucagon (human recombinant) (GLUCAGEN DIAGNOSTIC) injection 1 mg, 1 mg, Subcutaneous, Q15 Min PRN, Anupama Jones APRN  •  HYDROmorphone (DILAUDID) injection 0.5 mg, 0.5 mg, Intravenous, Q2H PRN, Yonatan Benavides MD, 0.5 mg at 04/16/21 2106  •  insulin lispro (ADMELOG) injection 0-7 Units, 0-7 Units, Subcutaneous, TID AC, Anupama Jones APRN  •  insulin patient supplied pump, , Subcutaneous, Continuous, Yonatan Benavides MD, Self Administered Via Pump at 04/15/21 1652  •  melatonin tablet 3 mg, 3 mg, Oral, Nightly PRN, Yonatan Benavides MD, 3 mg at 04/16/21 0110  •  metoclopramide (REGLAN) injection 10 mg, 10 mg, Intravenous, Q6H, Lena Aguayo MD, 10 mg at 04/17/21 0904  •  nitroglycerin (NITROSTAT) SL tablet 0.4 mg, 0.4 mg, Sublingual, Q5 Min PRN, Anupama Jones APRN  •  ondansetron (ZOFRAN) injection 4 mg, 4 mg, Intravenous, Q4H PRN, Lena Aguayo MD, 4 mg at 04/16/21 1254  •  pantoprazole (PROTONIX) EC tablet 40 mg, 40 mg, Oral, BID AC, Padmini Glover, APRN, 40 mg at 04/17/21 0557  •  [COMPLETED] Insert peripheral IV, , , Once **AND** sodium chloride 0.9 % flush 10 mL, 10 mL, Intravenous, PRN, Virgilio Toscano MD  •  sodium chloride 0.9 % flush 10 mL, 10 mL, Intravenous, Q12H, Anupama Jones, YUDELKA, 10 mL at 04/17/21  0905  •  sodium chloride 0.9 % flush 10 mL, 10 mL, Intravenous, PRN, Anupama Jones APRN, 10 mL at 04/15/21 0606  •  sodium chloride 0.9 % infusion, 100 mL/hr, Intravenous, Continuous, Anupama Jones APRN, Last Rate: 100 mL/hr at 04/17/21 0904, 100 mL/hr at 04/17/21 0904  •  sucralfate (CARAFATE) tablet 1 g, 1 g, Oral, BID, Padmini Glover APRN, 1 g at 04/17/21 0904  Review of Systems:   GI: negative    Objective     Vital Signs  Temp:  [98.1 °F (36.7 °C)-99 °F (37.2 °C)] 98.8 °F (37.1 °C)  Heart Rate:  [] 95  Resp:  [16-18] 16  BP: (131-165)/(85-98) 131/85  Body mass index is 19.08 kg/m².                  Physical Exam:              General: patient awake, alert and cooperative              Eyes: no scleral icterus              Skin: warm and dry, not jaundiced              Abdomen: soft, no TTP,               Psychiatric: Appropriate affect and behavior                Results Review:                I reviewed the patient's new clinical results.    Results from last 7 days   Lab Units 04/17/21  0615 04/17/21  0014 04/16/21  1555 04/16/21  0749 04/15/21  0407   WBC 10*3/mm3 8.16  --   --  9.09 13.83*   HEMOGLOBIN g/dL 13.2  13.2 11.8* 12.4* 13.0 14.2   HEMATOCRIT % 38.2  38.2 35.9* 37.3* 39.1 40.2   PLATELETS 10*3/mm3 272  --   --  294 301     Results from last 7 days   Lab Units 04/17/21  0615 04/16/21  0749 04/15/21  0407 04/14/21  1831   SODIUM mmol/L 140 142 141 139   POTASSIUM mmol/L 3.4* 3.4* 3.8 3.8   CHLORIDE mmol/L 105 103 101 100   CO2 mmol/L 25.3 24.9 25.3 27.3   BUN mg/dL 8 7 12 13   CREATININE mg/dL 0.72* 0.68* 0.76 0.82   CALCIUM mg/dL 8.9 8.4* 9.7 9.9   BILIRUBIN mg/dL  --   --  0.7 0.4   ALK PHOS U/L  --   --  77 79   ALT (SGPT) U/L  --   --  17 20   AST (SGOT) U/L  --   --  15 15   GLUCOSE mg/dL 128* 133* 207* 214*     Results from last 7 days   Lab Units 04/15/21  0407   INR  0.93     Lab Results   Lab Value Date/Time    LIPASE 23 04/14/2021 1831    LIPASE 12 (L) 03/20/2021  1834       Radiology:  No orders to display       Assessment/Plan     Patient Active Problem List   Diagnosis   • Type 1 diabetes mellitus with hyperglycemia (CMS/HCC)   • Fecal incontinence   • Erectile dysfunction   • Anxiety and depression   • Hyperplastic colonic polyp   • Gastroparesis   • Family history of colorectal cancer   • Sepsis without acute organ dysfunction (CMS/HCC)   • Alcohol abuse   • Tobacco abuse   • Acute upper GI bleed       Assessment:  1. Esophagitis, gastric erosions and abnormal small bowel appearance on recent EGD  2. Nausea  3. Gastroparesis  4. Abdominal pain  5. Acid reflux  6. Type 1 diabetes      Plan:  · Path from EGD reviewed, biopsies from SB non-specific, certainly not classic for celiac disease.  Await celiac serology  · For gastroparesis, continue metoclopramide, started yesterday  · Continue antiemetics as needed  · Recommend smaller more frequent meals and soft diet, encouraged oral intake as tolerated.  · Continue PPI and carafate    OK for DC home from GI standpoint with office f/u with Dr Aguayo    I discussed the patients findings and my recommendations with patient and nursing staff.            Aries Kaufman M.D.  [facility]  [address] [place] [address]  Office: [phone]

## 2021-04-17 NOTE — OUTREACH NOTE
Prep Survey      Responses   [facility] patient discharged from?  [place]   Is LACE score < 7 ?  No   Emergency Room discharge w/ pulse ox?  No   Eligibility  [facility]   Date of Admission  04/15/21   Date of Discharge  04/17/21   Discharge Disposition  Home or Self Care   Discharge diagnosis  Acute upper GI bleed    Does the patient have one of the following disease processes/diagnoses(primary or secondary)?  Other   Does the patient have Home health ordered?  No   Is there a DME ordered?  No   Prep survey completed?  Yes          Aundrea Jaime RN

## 2021-04-17 NOTE — DISCHARGE SUMMARY
PHYSICIAN DISCHARGE SUMMARY                                                                        [facility]    Patient Identification:  Name: Morro Blancas  Age: 30 y.o.  Sex: male  :  1990  MRN: [id number]  Primary Care Physician: Gale Banegas APRN    Admit date: 4/15/2021  Discharge date and time:2021  Discharged Condition: good    Discharge Diagnoses:  Active Hospital Problems    Diagnosis  POA    **Acute upper GI bleed [K92.2]  Yes    Alcohol abuse [F10.10]  Yes    Tobacco abuse [Z72.0]  Yes    Gastroparesis [K31.84]  Yes    Type 1 diabetes mellitus with hyperglycemia (CMS/HCC) [E10.65]  Yes    Anxiety and depression [F41.9, F32.9]  Yes      Resolved Hospital Problems   No resolved problems to display.    Underweight with BMI of 19.1      PMHX:   Past Medical History:   Diagnosis Date    Anxiety     Anxiety and depression     Colon polyp 2017    1    Depression     Diabetes type 1, uncontrolled (CMS/HCC)     diagnosed at age 17    Dyspepsia     Family hx of colon cancer     Gastroparesis     GERD (gastroesophageal reflux disease)      PSHX:   Past Surgical History:   Procedure Laterality Date    COLONOSCOPY N/A 2017    One 3 mm polyp in the sigmoid colon, Granularity in the terminal ileum    ENDOSCOPY N/A 4/15/2021    Procedure: ESOPHAGOGASTRODUODENOSCOPY with biopsies;  Surgeon: Da Fleming MD;  Location: [facility] ENDOSCOPY;  Service: Gastroenterology;  Laterality: N/A;  PRE:   nausea, vomiting, hematemesis  POST:  reflux esophagitis, erosive gastropathy, scalloped mucosa in duodenum ? celiac       Hospital Course: Morro Blancas   is a 30 y.o. male with history of diabetes, GERD, gastroparesis, depression, anxiety, and dyspepsia who presents to the hospital complaining of sudden onset of generalized abdominal pain with nausea and vomiting that occurred 4 to 5 hours prior to coming to hospital ER.   The patient states that the emesis is bloody.  The patient has a history of diabetic gastroparesis and was actually seen here earlier in the evening prior to admission for similar symptoms however there was no blood in the emesis at that particular point.  He complains of generalized abdominal pain described as an achy sensation that is nonradiating.  The patient denies history of esophageal varices or liver disease.  There currently no aggravating or alleviating factors.  The patient states that he did take his oral Reglan prior to ED arrival but vomited the medication back up so it did not take effect.  Symptoms are currently moderate to severe in intensity and have been worsening since onset.          The patient was admitted to hospital and seen by GI medicine.  Patient had Protonix drip and Carafate was started.  EGD was done which showed esophagitis and gastritis.  There are some concern patient might have celiac.  Biopsies were nonspecific.  Patient had some lab for celiac panel sent and is pending at discharge.  Patient will follow up with primary care in 1 week and continue with Protonix twice daily and Carafate.  Patient also follow-up with GI medicine.  We will continue with his insulin pump and other usual medicines.    Consults:      Consults       Date and Time Order Name Status Description    4/15/2021  5:40 AM Inpatient Gastroenterology Consult Completed     4/15/2021  5:21 AM [facility] (on-call MD unless specified) Details Completed     3/21/2021 10:11 AM Inpatient Gastroenterology Consult Completed     3/20/2021 10:01 PM [facility] (on-call MD unless specified) Details Completed           Results from last 7 days   Lab Units 04/17/21  0615   WBC 10*3/mm3 8.16   HEMOGLOBIN g/dL 13.2  13.2   HEMATOCRIT % 38.2  38.2   PLATELETS 10*3/mm3 272     Results from last 7 days   Lab Units 04/17/21  0615   SODIUM mmol/L 140   POTASSIUM mmol/L 3.4*   CHLORIDE mmol/L 105   CO2 mmol/L 25.3   BUN mg/dL 8   CREATININE mg/dL  0.72*   GLUCOSE mg/dL 128*   CALCIUM mg/dL 8.9     Significant Diagnostic Studies:   WBC   Date Value Ref Range Status   04/17/2021 8.16 3.40 - 10.80 10*3/mm3 Final     Hemoglobin   Date Value Ref Range Status   04/17/2021 13.2 13.0 - 17.7 g/dL Final   04/17/2021 13.2 13.0 - 17.7 g/dL Final     Hematocrit   Date Value Ref Range Status   04/17/2021 38.2 37.5 - 51.0 % Final   04/17/2021 38.2 37.5 - 51.0 % Final     Platelets   Date Value Ref Range Status   04/17/2021 272 140 - 450 10*3/mm3 Final     Sodium   Date Value Ref Range Status   04/17/2021 140 136 - 145 mmol/L Final     Potassium   Date Value Ref Range Status   04/17/2021 3.4 (L) 3.5 - 5.2 mmol/L Final     Chloride   Date Value Ref Range Status   04/17/2021 105 98 - 107 mmol/L Final     CO2   Date Value Ref Range Status   04/17/2021 25.3 22.0 - 29.0 mmol/L Final     BUN   Date Value Ref Range Status   04/17/2021 8 6 - 20 mg/dL Final     Creatinine   Date Value Ref Range Status   04/17/2021 0.72 (L) 0.76 - 1.27 mg/dL Final     Glucose   Date Value Ref Range Status   04/17/2021 128 (H) 65 - 99 mg/dL Final     Calcium   Date Value Ref Range Status   04/17/2021 8.9 8.6 - 10.5 mg/dL Final     Magnesium   Date Value Ref Range Status   04/14/2021 1.8 1.6 - 2.6 mg/dL Final     AST (SGOT)   Date Value Ref Range Status   04/15/2021 15 1 - 40 U/L Final     ALT (SGPT)   Date Value Ref Range Status   04/15/2021 17 1 - 41 U/L Final     Alkaline Phosphatase   Date Value Ref Range Status   04/15/2021 77 39 - 117 U/L Final     INR   Date Value Ref Range Status   04/15/2021 0.93 0.90 - 1.10 Final     No results found for: COLORU, CLARITYU, SPECGRAV, PHUR, PROTEINUR, GLUCOSEU, KETONESU, BLOODU, NITRITE, LEUKOCYTESUR, BILIRUBINUR, UROBILINOGEN, RBCUA, WBCUA, BACTERIA, UACOMMENT  No results found for: TROPONINT, TROPONINI, BNP  No components found for: HGBA1C;2  No components found for: TSH;2  Imaging Results (All)       None          Lab Results (last 7 days)       Procedure  Component Value Units Date/Time    POC Glucose Once [[id number]]  (Abnormal) Collected: 04/17/21 1127    Specimen: Blood Updated: 04/17/21 1132     Glucose 255 mg/dL     Basic Metabolic Panel [[id number]]  (Abnormal) Collected: 04/17/21 0615    Specimen: Blood Updated: 04/17/21 0726     Glucose 128 mg/dL      BUN 8 mg/dL      Creatinine 0.72 mg/dL      Sodium 140 mmol/L      Potassium 3.4 mmol/L      Chloride 105 mmol/L      CO2 25.3 mmol/L      Calcium 8.9 mg/dL      eGFR Non African Amer 128 mL/min/1.73      BUN/Creatinine Ratio 11.1     Anion Gap 9.7 mmol/L     Narrative:      GFR Normal >60  Chronic Kidney Disease <60  Kidney Failure <15      Hemoglobin & Hematocrit, Blood [[id number]]  (Normal) Collected: 04/17/21 0615    Specimen: Blood Updated: 04/17/21 0643     Hemoglobin 13.2 g/dL      Hematocrit 38.2 %     CBC & Differential [[id number]]  (Normal) Collected: 04/17/21 0615    Specimen: Blood Updated: 04/17/21 0643    Narrative:      The following orders were created for panel order CBC & Differential.  Procedure                               Abnormality         Status                     ---------                               -----------         ------                     CBC Auto Differential[[id number]]        Normal              Final result                 Please view results for these tests on the individual orders.    CBC Auto Differential [[id number]]  (Normal) Collected: 04/17/21 0615    Specimen: Blood Updated: 04/17/21 0643     WBC 8.16 10*3/mm3      RBC 4.23 10*6/mm3      Hemoglobin 13.2 g/dL      Hematocrit 38.2 %      MCV 90.3 fL      MCH 31.2 pg      MCHC 34.6 g/dL      RDW 13.0 %      RDW-SD 42.7 fl      MPV 10.3 fL      Platelets 272 10*3/mm3      Neutrophil % 50.3 %      Lymphocyte % 37.6 %      Monocyte % 10.4 %      Eosinophil % 0.7 %      Basophil % 0.6 %      Immature Grans % 0.4 %      Neutrophils, Absolute 4.10 10*3/mm3      Lymphocytes, Absolute 3.07 10*3/mm3      Monocytes, Absolute 0.85  10*3/mm3      Eosinophils, Absolute 0.06 10*3/mm3      Basophils, Absolute 0.05 10*3/mm3      Immature Grans, Absolute 0.03 10*3/mm3      nRBC 0.0 /100 WBC     POC Glucose Once [[id number]]  (Abnormal) Collected: 04/17/21 0632    Specimen: Blood Updated: 04/17/21 0633     Glucose 133 mg/dL     Hemoglobin & Hematocrit, Blood [[id number]]  (Abnormal) Collected: 04/17/21 0014    Specimen: Blood Updated: 04/17/21 0045     Hemoglobin 11.8 g/dL      Hematocrit 35.9 %     POC Glucose Once [[id number]]  (Abnormal) Collected: 04/16/21 2109    Specimen: Blood Updated: 04/16/21 2111     Glucose 139 mg/dL     POC Glucose Once [[id number]]  (Abnormal) Collected: 04/16/21 1620    Specimen: Blood Updated: 04/16/21 1626     Glucose 191 mg/dL     Hemoglobin & Hematocrit, Blood [[id number]]  (Abnormal) Collected: 04/16/21 1555    Specimen: Blood from Arm, Left Updated: 04/16/21 1611     Hemoglobin 12.4 g/dL      Hematocrit 37.3 %     Urease For H Pylori - Tissue, Gastric, Antrum [[id number]]  (Normal) Collected: 04/15/21 1304    Specimen: Tissue from Gastric, Antrum Updated: 04/16/21 1528     Urease Negative    Celiac Disease Panel [[id number]] Collected: 04/16/21 1307    Specimen: Blood from Arm, Left Updated: 04/16/21 1327    Tissue Pathology Exam [[id number]] Collected: 04/15/21 1303    Specimen: Tissue from Small Intestine; Tissue from Gastric, Body Updated: 04/16/21 1124     Case Report --     Surgical Pathology Report                         Case: [id number]                                  Authorizing Provider:  Da Fleming MD      Collected:           04/15/2021 01:03 PM          Ordering Location:     [facility]  Received:            04/15/2021 01:46 PM                                 ENDO SUITES                                                                  Pathologist:           Terry Canales MD                                                         Specimens:   1) - Small Intestine, changes of celiac                                                              2) - Gastric, Body, gastric erosion                                                         Final Diagnosis --     1. Small Intestinal Biopsy: Benign small intestinal mucosa with    A. Relatively normal villous architecture with minimally increased intraepithelial lymphocytes (see comment).   B. No active inflammation, granulomatous inflammation nor intestinal parasites identified.    2. Gastric Erosions Biopsy: Benign gastric mucosa with    A. Minimal chronic inflammation.   B. No H. pylori-like organisms identified by routine staining.   C. Focal reparative change noted, no dysplasia nor malignancy identified.    filipet/abdiele        Comment --     The biopsy from part 1 (clinically small intestine) shows normal villous architecture with minimally increased intraepithelial lymphocytes.  Rare lymphocytes are noted at the tips of villi.  The observed changes are subtle and relatively nonspecific, but could be seen with developing malabsorption syndrome including celiac disease.  Correlation with serology would be necessary to exclude celiac disease if clinically indicated.      buster/daniela        Gross Description --     1. The specimen is received in formalin labeled with the patient's name and further designated 'small intestine changes of celiac biopsy' are multiple small fragments of gray-tan tissue. The specimen is submitted for embedding as received.    2. The specimen is received in formalin labeled with the patient's name and further designated 'gastric erosion biopsy' is a small fragment of gray-tan tissue. The specimen is submitted for embedding as received.           Microscopic Description --     Performed, incorporated in diagnosis.       POC Glucose Once [[id number]]  (Abnormal) Collected: 04/16/21 1053    Specimen: Blood Updated: 04/16/21 1055     Glucose 158 mg/dL     CBC & Differential [[id number]]  (Normal) Collected: 04/16/21 0749    Specimen: Blood  from Arm, Left Updated: 04/16/21 0919    Narrative:      The following orders were created for panel order CBC & Differential.  Procedure                               Abnormality         Status                     ---------                               -----------         ------                     CBC Auto Differential[[id number]]        Normal              Final result                 Please view results for these tests on the individual orders.    CBC Auto Differential [[id number]]  (Normal) Collected: 04/16/21 0749    Specimen: Blood from Arm, Left Updated: 04/16/21 0919     WBC 9.09 10*3/mm3      RBC 4.25 10*6/mm3      Hemoglobin 13.0 g/dL      Hematocrit 39.1 %      MCV 92.0 fL      MCH 30.6 pg      MCHC 33.2 g/dL      RDW 13.6 %      RDW-SD 46.7 fl      MPV 10.8 fL      Platelets 294 10*3/mm3      Neutrophil % 61.2 %      Lymphocyte % 28.5 %      Monocyte % 9.5 %      Eosinophil % 0.3 %      Basophil % 0.3 %      Immature Grans % 0.2 %      Neutrophils, Absolute 5.56 10*3/mm3      Lymphocytes, Absolute 2.59 10*3/mm3      Monocytes, Absolute 0.86 10*3/mm3      Eosinophils, Absolute 0.03 10*3/mm3      Basophils, Absolute 0.03 10*3/mm3      Immature Grans, Absolute 0.02 10*3/mm3      nRBC 0.0 /100 WBC     Basic Metabolic Panel [[id number]]  (Abnormal) Collected: 04/16/21 0749    Specimen: Blood from Arm, Left Updated: 04/16/21 0919     Glucose 133 mg/dL      BUN 7 mg/dL      Creatinine 0.68 mg/dL      Sodium 142 mmol/L      Potassium 3.4 mmol/L      Chloride 103 mmol/L      CO2 24.9 mmol/L      Calcium 8.4 mg/dL      eGFR Non African Amer 137 mL/min/1.73      BUN/Creatinine Ratio 10.3     Anion Gap 14.1 mmol/L     Narrative:      GFR Normal >60  Chronic Kidney Disease <60  Kidney Failure <15      POC Glucose Once [[id number]]  (Normal) Collected: 04/16/21 0731    Specimen: Blood Updated: 04/16/21 0733     Glucose 115 mg/dL     Hemoglobin & Hematocrit, Blood [[id number]]  (Abnormal) Collected: 04/15/21 0761     Specimen: Blood Updated: 04/16/21 0034     Hemoglobin 12.8 g/dL      Hematocrit 38.4 %     POC Glucose Once [[id number]]  (Abnormal) Collected: 04/15/21 2032    Specimen: Blood Updated: 04/15/21 2034     Glucose 161 mg/dL     Fungus Smear - Brushing, Esophagus [[id number]] Collected: 04/15/21 1306    Specimen: Brushing from Esophagus Updated: 04/15/21 1833     Fungal Stain No fungal elements seen    POC Glucose Once [[id number]]  (Normal) Collected: 04/15/21 1705    Specimen: Blood Updated: 04/15/21 1707     Glucose 119 mg/dL     Hemoglobin & Hematocrit, Blood [[id number]]  (Abnormal) Collected: 04/15/21 1601    Specimen: Blood Updated: 04/15/21 1636     Hemoglobin 12.9 g/dL      Hematocrit 38.4 %     Fungus Culture - Brushing, Esophagus [[id number]] Collected: 04/15/21 1306    Specimen: Brushing from Esophagus Updated: 04/15/21 1527    Hemoglobin & Hematocrit, Blood [[id number]]  (Abnormal) Collected: 04/15/21 1418    Specimen: Blood Updated: 04/15/21 1522     Hemoglobin 12.8 g/dL      Hematocrit 36.2 %     COVID PRE-OP / PRE-PROCEDURE SCREENING ORDER (NO ISOLATION) - Swab, Nasopharynx [[id number]]  (Normal) Collected: 04/15/21 0543    Specimen: Swab from Nasopharynx Updated: 04/15/21 1146    Narrative:      The following orders were created for panel order COVID PRE-OP / PRE-PROCEDURE SCREENING ORDER (NO ISOLATION) - Swab, Nasopharynx.  Procedure                               Abnormality         Status                     ---------                               -----------         ------                     COVID-19,APTIMA PANTHER,...[[id number]]  Normal              Final result                 Please view results for these tests on the individual orders.    COVID-19,[facility], NP/OP SWAB IN UTM/VTM/SALINE TRANSPORT MEDIA,24 HR TAT - Swab, Nasopharynx [[id number]]  (Normal) Collected: 04/15/21 0543    Specimen: Swab from Nasopharynx Updated: 04/15/21 1146     COVID19 Not Detected    Narrative:      Fact  sheet for providers: https://www.fda.gov/media/861361/download     Fact sheet for patients: https://www.fda.gov/media/409025/download    Test performed by RT PCR.    POC Glucose Once [[id number]]  (Abnormal) Collected: 04/15/21 0803    Specimen: Blood Updated: 04/15/21 0805     Glucose 169 mg/dL     Protime-INR [[id number]]  (Normal) Collected: 04/15/21 0407    Specimen: Blood Updated: 04/15/21 0456     Protime 12.3 Seconds      INR 0.93    aPTT [[id number]]  (Abnormal) Collected: 04/15/21 0407    Specimen: Blood Updated: 04/15/21 0456     PTT 22.1 seconds     Comprehensive Metabolic Panel [[id number]]  (Abnormal) Collected: 04/15/21 0407    Specimen: Blood Updated: 04/15/21 0432     Glucose 207 mg/dL      BUN 12 mg/dL      Creatinine 0.76 mg/dL      Sodium 141 mmol/L      Potassium 3.8 mmol/L      Comment: Slight hemolysis detected by analyzer. Results may be affected.        Chloride 101 mmol/L      CO2 25.3 mmol/L      Calcium 9.7 mg/dL      Total Protein 7.3 g/dL      Albumin 4.50 g/dL      ALT (SGPT) 17 U/L      AST (SGOT) 15 U/L      Alkaline Phosphatase 77 U/L      Total Bilirubin 0.7 mg/dL      eGFR Non African Amer 120 mL/min/1.73      Globulin 2.8 gm/dL      A/G Ratio 1.6 g/dL      BUN/Creatinine Ratio 15.8     Anion Gap 14.7 mmol/L     Narrative:      GFR Normal >60  Chronic Kidney Disease <60  Kidney Failure <15      CBC & Differential [[id number]]  (Abnormal) Collected: 04/15/21 0407    Specimen: Blood Updated: 04/15/21 0422    Narrative:      The following orders were created for panel order CBC & Differential.  Procedure                               Abnormality         Status                     ---------                               -----------         ------                     CBC Auto Differential[[id number]]        Abnormal            Final result                 Please view results for these tests on the individual orders.    CBC Auto Differential [[id number]]  (Abnormal) Collected: 04/15/21 0407     "Specimen: Blood Updated: 04/15/21 0422     WBC 13.83 10*3/mm3      RBC 4.45 10*6/mm3      Hemoglobin 14.2 g/dL      Hematocrit 40.2 %      MCV 90.3 fL      MCH 31.9 pg      MCHC 35.3 g/dL      RDW 13.4 %      RDW-SD 44.7 fl      MPV 10.8 fL      Platelets 301 10*3/mm3      Neutrophil % 77.7 %      Lymphocyte % 16.5 %      Monocyte % 5.1 %      Eosinophil % 0.1 %      Basophil % 0.3 %      Immature Grans % 0.3 %      Neutrophils, Absolute 10.75 10*3/mm3      Lymphocytes, Absolute 2.28 10*3/mm3      Monocytes, Absolute 0.71 10*3/mm3      Eosinophils, Absolute 0.01 10*3/mm3      Basophils, Absolute 0.04 10*3/mm3      Immature Grans, Absolute 0.04 10*3/mm3      nRBC 0.0 /100 WBC           /90 (BP Location: Left arm, Patient Position: Standing)   Pulse 106   Temp 98.4 °F (36.9 °C) (Oral)   Resp 16   Ht 177.8 cm (70\")   Wt 60.3 kg (133 lb)   SpO2 99%   BMI 19.08 kg/m²     Discharge Exam:  General Appearance:    Alert, cooperative, no distress                          Head:    Normocephalic, without obvious abnormality, atraumatic                          Eyes:                            Throat:   Lips, tongue, gums normal                          Neck:   Supple, symmetrical, trachea midline, no JVD                        Lungs:     Clear to auscultation bilaterally, respirations unlabored                Chest Wall:    No tenderness or deformity                        Heart:    Regular rate and rhythm, S1 and S2 normal, no murmur,no  Rub or gallop                  Abdomen:     Soft, non-tender, bowel sounds active, no masses, no organomegaly                  Extremities:   Extremities normal, atraumatic, no cyanosis or edema                             Skin:   Skin is warm and dry,  no rashes or palpable lesions                  Neurologic:   no focal deficits noted     Disposition:  Home    Patient Instructions:      Discharge Medications        New Medications        Instructions Start Date   insulin  " patient supplied pump   1 Units, Subcutaneous, Continuous, Adjust as directed      pantoprazole 40 MG EC tablet  Commonly known as: PROTONIX   40 mg, Oral, 2 Times Daily Before Meals      sucralfate 1 g tablet  Commonly known as: Carafate   1 g, Oral, 4 Times Daily             Continue These Medications        Instructions Start Date   Dexcom G6 Transmitter misc   No dose, route, or frequency recorded.      Glucagon Emergency 1 MG kit   No dose, route, or frequency recorded.      insulin lispro 100 UNIT/ML injection  Commonly known as: humaLOG   Subcutaneous, 3 Times Daily Before Meals      metoclopramide 5 MG tablet  Commonly known as: Reglan   5 mg, Oral, 4 Times Daily Before Meals & Nightly PRN      promethazine 25 MG suppository  Commonly known as: PHENERGAN   25 mg, Rectal, Every 6 Hours PRN             Stop These Medications      Dexilant 60 MG capsule  Generic drug: dexlansoprazole            Future Appointments   Date Time Provider Department Center   7/28/2021  9:30 AM Gale Banegas APRN [facility]     Follow-up Information       Lena Aguayo MD Follow up.    Specialty: Gastroenterology  Contact information:  [facility] [place] [address]  [phone]               Gale Banegas APRN Follow up in 1 week(s).    Specialty: Family Medicine  Contact information:  [address] [place] [id number] [phone]                   Discharge Order (From admission, onward)       Start     Ordered    04/17/21 1537  Discharge patient  Once     Expected Discharge Date: 04/17/21    Discharge Disposition: Home or Self Care    Physician of Record for Attribution - Please select from Treatment Team: RE CARABALLO [[id number]]    Review needed by CMO to determine Physician of Record: No       Question Answer Comment   Physician of Record for Attribution - Please select from Treatment Team RE CARABALLO    Review needed by CMO to determine Physician of Record No        04/17/21  1540                    Total time spent discharging patient including evaluation,post hospitalization follow up,  medication and post hospitalization instructions and education total time exceeds 30 minutes.    Signed:  Yonatan Benavides MD  4/17/2021  15:40 EDT

## 2021-04-19 ENCOUNTER — TRANSITIONAL CARE MANAGEMENT TELEPHONE ENCOUNTER (OUTPATIENT)
Dept: CALL CENTER | Facility: HOSPITAL | Age: 31
End: 2021-04-19

## 2021-04-19 LAB
ENDOMYSIUM IGA SER QL: NEGATIVE
IGA SERPL-MCNC: 377 MG/DL (ref 90–386)
TTG IGA SER-ACNC: <2 U/ML (ref 0–3)

## 2021-04-19 NOTE — OUTREACH NOTE
Call Center TCM Note      Responses   [facility] patient discharged from?  [place]   Does the patient have one of the following disease processes/diagnoses(primary or secondary)?  Other   TCM attempt successful?  Yes [Julienne mother listed on verbal release ]   Call start time  1302   Call end time  1305   Discharge diagnosis  Acute upper GI bleed    Meds reviewed with patient/caregiver?  Yes   Is the patient having any side effects they believe may be caused by any medication additions or changes?  No   Does the patient have all medications ordered at discharge?  Yes   Is the patient taking all medications as directed (includes completed medication regime)?  Yes   Does the patient have a primary care provider?   Yes   Does the patient have an appointment with their PCP within 7 days of discharge?  No   Comments regarding PCP  Patient states he will call his PCP on his lunch break when he has his schedule in front of him   What is preventing the patient from scheduling follow up appointments within 7 days of discharge?  Haven't had time   Nursing Interventions  Educated patient on importance of making appointment, Advised patient to make appointment   Has the patient kept scheduled appointments due by today?  N/A   Has home health visited the patient within 72 hours of discharge?  N/A   Psychosocial issues?  No   Did the patient receive a copy of their discharge instructions?  Yes   Nursing interventions  Reviewed instructions with patient   What is the patient's perception of their health status since discharge?  Improving   Is the patient/caregiver able to teach back signs and symptoms related to disease process for when to call PCP?  Yes   Is the patient/caregiver able to teach back signs and symptoms related to disease process for when to call 911?  Yes   Is the patient/caregiver able to teach back the hierarchy of who to call/visit for symptoms/problems? PCP, Specialist, Home health nurse, Urgent Care,  ED, 911  Yes   If the patient is a current smoker, are they able to teach back resources for cessation?  [id number]   TCM call completed?  Yes          Lyudmila Toth RN    4/19/2021, 13:06 EDT

## 2021-04-19 NOTE — PROGRESS NOTES
Case Management Discharge Note      Final Note: Home denies any needs. Transport by private auto    Provided Post Acute Provider List?: N/A  N/A Provider List Comment: The patient was not provided with a HH/SNF list nor a print out of the HH/nursing home compare list from Medicare.gov as the patient currently denies any d/c needs.  Provided Post Acute Provider Quality & Resource List?: N/A  N/A Quality & Resource List Comment: The patient was not provided with a HH/SNF list nor a print out of the HH/nursing home compare list from Medicare.gov as the patient currently denies any d/c needs.    Selected Continued Care - Discharged on 4/17/2021 Admission date: 4/15/2021 - Discharge disposition: Home or Self Care    Destination    No services have been selected for the patient.              Durable Medical Equipment    No services have been selected for the patient.              Dialysis/Infusion    No services have been selected for the patient.              Home Medical Care    No services have been selected for the patient.              Therapy    No services have been selected for the patient.              Community Resources    No services have been selected for the patient.                  Transportation Services  Private: Car    Final Discharge Disposition Code: 01 - home or self-care

## 2021-04-20 ENCOUNTER — TELEPHONE (OUTPATIENT)
Dept: GASTROENTEROLOGY | Facility: CLINIC | Age: 31
End: 2021-04-20

## 2021-04-20 DIAGNOSIS — B37.81 CANDIDA INFECTION, ESOPHAGEAL (HCC): Primary | ICD-10-CM

## 2021-04-20 RX ORDER — FLUCONAZOLE 100 MG/1
TABLET ORAL
Qty: 23 TABLET | Refills: 0 | Status: SHIPPED | OUTPATIENT
Start: 2021-04-20 | End: 2021-05-23

## 2021-04-20 NOTE — TELEPHONE ENCOUNTER
----- Message from Morro Blancas sent at 4/17/2021 10:31 AM EDT -----  Regarding: Visit Follow-Up Question  Contact: [phone]  I have been trying to make an appointment with you but i cant seem to get the office to answer the phone. I was wondering if you had anything available for Tuesday April 20 around 11am. I want to talk to you about my most recent hospital visit and what my next steps are to prevent having to go to the hospital again

## 2021-04-20 NOTE — TELEPHONE ENCOUNTER
Called pt and advised that Ellyn [facility] does not have any openings until his appt day on 04/28.  Asked pt if this was ok. Pt states that this is fine and he is ok with waiting until his appt on 04/28/2021.

## 2021-04-26 ENCOUNTER — OFFICE VISIT (OUTPATIENT)
Dept: FAMILY MEDICINE CLINIC | Facility: CLINIC | Age: 31
End: 2021-04-26

## 2021-04-26 VITALS
BODY MASS INDEX: 19.61 KG/M2 | OXYGEN SATURATION: 99 % | DIASTOLIC BLOOD PRESSURE: 88 MMHG | WEIGHT: 137 LBS | HEART RATE: 97 BPM | SYSTOLIC BLOOD PRESSURE: 130 MMHG | TEMPERATURE: 97.5 F | HEIGHT: 70 IN

## 2021-04-26 DIAGNOSIS — F41.1 GAD (GENERALIZED ANXIETY DISORDER): ICD-10-CM

## 2021-04-26 DIAGNOSIS — F33.2 SEVERE EPISODE OF RECURRENT MAJOR DEPRESSIVE DISORDER, WITHOUT PSYCHOTIC FEATURES (HCC): Primary | ICD-10-CM

## 2021-04-26 PROCEDURE — 99214 OFFICE O/P EST MOD 30 MIN: CPT | Performed by: NURSE PRACTITIONER

## 2021-04-26 RX ORDER — ROPINIROLE 0.5 MG/1
0.5 TABLET, FILM COATED ORAL DAILY PRN
COMMUNITY
Start: 2021-04-25

## 2021-04-26 RX ORDER — INSULIN PUMP CONTROLLER
EACH MISCELLANEOUS
COMMUNITY
Start: 2021-04-23 | End: 2021-05-23 | Stop reason: SDUPTHER

## 2021-04-26 RX ORDER — ONDANSETRON 4 MG/1
4 TABLET, ORALLY DISINTEGRATING ORAL EVERY 8 HOURS PRN
COMMUNITY
Start: 2021-04-25

## 2021-04-26 RX ORDER — DULOXETIN HYDROCHLORIDE 30 MG/1
30 CAPSULE, DELAYED RELEASE ORAL DAILY
Qty: 90 CAPSULE | Refills: 1 | Status: SHIPPED | OUTPATIENT
Start: 2021-04-26 | End: 2021-06-07 | Stop reason: SDUPTHER

## 2021-04-26 NOTE — PROGRESS NOTES
Chief Complaint  Nicotine Dependence (requesting patches), Restless Legs Syndrome, and Depression    Subjective          Morro Blancas presents to [facility]  Mr. Blancas presents today to discuss his anxiety/depression. He reports that he was on Paxil two years ago for his anxiety/depression but stopped taking it because it made him feel numb. He reports that currently he feels very depressed, he can hold a job, wants to sleep all of the time and has been socially isolating himself from friends and family. He is very distressed.  He also has been in the [facility] x two admissions over the past 4 weeks with GI issues. He is scheduled to follow up with GI this week.     Depression  Visit Type: follow-up  Patient presents with the following symptoms: decreased concentration, depressed mood, excessive worry, fatigue, feelings of hopelessness, feelings of worthlessness, hypersomnia, impotence, irritability, malaise, memory impairment, nervousness/anxiety, palpitations (SOMETIMES) and restlessness.  Patient is not experiencing: shortness of breath.  Frequency of symptoms: constantly   Severity: interfering with daily activities       I have reviewed the patient's medical history in detail and updated the computerized patient record.    Current Outpatient Medications:   •  Continuous Blood Gluc Transmit (Dexcom G6 Transmitter) misc, , Disp: , Rfl:   •  fluconazole (DIFLUCAN) 100 MG tablet, Take two tablets day 1 then one tablet x 21 days. #23. No refills., Disp: 23 tablet, Rfl: 0  •  Glucagon, rDNA, (Glucagon Emergency) 1 MG kit, , Disp: , Rfl:   •  insulin lispro (humaLOG) 100 UNIT/ML injection, Inject  under the skin into the appropriate area as directed 3 (Three) Times a Day Before Meals., Disp: , Rfl:   •  insulin patient supplied pump, Inject 1 Units under the skin into the appropriate area as directed Continuous. Adjust as directed, Disp: , Rfl:   •  metoclopramide (Reglan) 5 MG tablet, Take  "1 tablet by mouth 4 (Four) Times a Day Before Meals & at Bedtime As Needed (nausea/vomiting/gatroparesis)., Disp: 20 tablet, Rfl: 0  •  ondansetron ODT (ZOFRAN-ODT) 4 MG disintegrating tablet, Take 4 mg by mouth., Disp: , Rfl:   •  pantoprazole (PROTONIX) 40 MG EC tablet, Take 1 tablet by mouth 2 (Two) Times a Day Before Meals for 30 days., Disp: 60 tablet, Rfl: 0  •  promethazine (PHENERGAN) 25 MG suppository, Insert 1 suppository into the rectum Every 6 (Six) Hours As Needed for Nausea or Vomiting., Disp: 20 suppository, Rfl: 0  •  rOPINIRole (REQUIP) 0.5 MG tablet, Take 0.5 mg by mouth 3 (Three) Times a Day., Disp: , Rfl:   •  sucralfate (Carafate) 1 g tablet, Take 1 tablet by mouth 4 (Four) Times a Day for 30 days., Disp: 120 tablet, Rfl: 0  •  DULoxetine (CYMBALTA) 30 MG capsule, Take 1 capsule by mouth Daily., Disp: 90 capsule, Rfl: 1  •  Insulin Disposable Pump (OmniPod Dash 5 Pack Pods) misc, , Disp: , Rfl:   Objective   Vital Signs:   /88 (BP Location: Right arm, Patient Position: Sitting, Cuff Size: Adult)   Pulse 97   Temp 97.5 °F (36.4 °C) (Infrared)   Ht 177.8 cm (70\")   Wt 62.1 kg (137 lb)   SpO2 99%   BMI 19.66 kg/m²     Physical Exam  Vitals reviewed.   Constitutional:       Appearance: Normal appearance.   Cardiovascular:      Rate and Rhythm: Normal rate and regular rhythm.      Pulses: Normal pulses.      Heart sounds: Normal heart sounds.   Pulmonary:      Effort: Pulmonary effort is normal.      Breath sounds: Normal breath sounds.   Neurological:      Mental Status: He is alert and oriented to person, place, and time.   Psychiatric:         Attention and Perception: Attention and perception normal.         Mood and Affect: Mood is depressed. Mood is not anxious.         Speech: Speech normal.         Behavior: Behavior normal. Behavior is cooperative.         Thought Content: Thought content normal.         Cognition and Memory: Cognition and memory normal.         Judgment: " Judgment normal.        Result Review :                 Assessment and Plan    Diagnoses and all orders for this visit:    1. Severe episode of recurrent major depressive disorder, without psychotic features (CMS/HCC) (Primary)    2. MATI (generalized anxiety disorder)    Other orders  -     DULoxetine (CYMBALTA) 30 MG capsule; Take 1 capsule by mouth Daily.  Dispense: 90 capsule; Refill: 1    Mr. Blancas presents today to discuss his anxiety/depression and resuming medication for management of his symptoms.  I will start him on Duloxetine 30 mg daily.   He is to follow up in 6 weeks on his anxiety/depression.     Follow Up   Return in about 6 weeks (around 6/7/2021).  Patient was given instructions and counseling regarding his condition or for health maintenance advice. Please see specific information pulled into the AVS if appropriate.

## 2021-04-27 ENCOUNTER — READMISSION MANAGEMENT (OUTPATIENT)
Dept: CALL CENTER | Facility: HOSPITAL | Age: 31
End: 2021-04-27

## 2021-04-27 NOTE — OUTREACH NOTE
Medical Week 2 Survey      Responses   [facility] patient discharged from?  [place]   Does the patient have one of the following disease processes/diagnoses(primary or secondary)?  Other   Week 2 attempt successful?  Yes   Call start time  1722   Discharge diagnosis  Acute upper GI bleed    Revoke  Readmitted [Patient states he was admitted to [facility] and was release on Sunday ]   Call end time  1724          Milagros Alexander LPN

## 2021-04-28 ENCOUNTER — OFFICE VISIT (OUTPATIENT)
Dept: GASTROENTEROLOGY | Facility: CLINIC | Age: 31
End: 2021-04-28

## 2021-04-28 VITALS — WEIGHT: 139.4 LBS | HEIGHT: 72 IN | BODY MASS INDEX: 18.88 KG/M2 | TEMPERATURE: 97.3 F

## 2021-04-28 DIAGNOSIS — K92.2 ACUTE UPPER GI BLEED: Primary | ICD-10-CM

## 2021-04-28 DIAGNOSIS — K21.01 GASTROESOPHAGEAL REFLUX DISEASE WITH ESOPHAGITIS AND HEMORRHAGE: ICD-10-CM

## 2021-04-28 DIAGNOSIS — K31.84 GASTROPARESIS: ICD-10-CM

## 2021-04-28 DIAGNOSIS — B37.0 ORAL CANDIDIASIS: ICD-10-CM

## 2021-04-28 DIAGNOSIS — K25.3 ACUTE GASTRIC EROSION: ICD-10-CM

## 2021-04-28 PROCEDURE — 99214 OFFICE O/P EST MOD 30 MIN: CPT | Performed by: NURSE PRACTITIONER

## 2021-04-28 RX ORDER — METOCLOPRAMIDE 5 MG/1
5 TABLET ORAL
Qty: 120 TABLET | Refills: 3 | Status: SHIPPED | OUTPATIENT
Start: 2021-04-28 | End: 2021-10-19

## 2021-04-28 RX ORDER — PANTOPRAZOLE SODIUM 40 MG/1
40 TABLET, DELAYED RELEASE ORAL
Qty: 60 TABLET | Refills: 5 | Status: SHIPPED | OUTPATIENT
Start: 2021-04-28 | End: 2021-10-25

## 2021-04-28 RX ORDER — SUCRALFATE 1 G/1
1 TABLET ORAL 2 TIMES DAILY
Qty: 60 TABLET | Refills: 3 | Status: SHIPPED | OUTPATIENT
Start: 2021-04-28 | End: 2021-08-26

## 2021-04-28 NOTE — PROGRESS NOTES
Chief Complaint   Patient presents with   • Hospital Follow Up Visit       Morro Blancas is a  30 y.o. male here for a hospital follow up visit for GI bleed.    HPI  30-year-old male presents today for hospital follow-up visit for acute GI bleed.  He is a patient of Dr. Aguayo.  He was last seen by me in the office on 11/4/2020.  He was seen by our service about his health [facility] to most recent times this year 1 from 3/21 through 3/22/2021 and most recently on 4/15 through 4/17/2021.  This last hospital stay he was having severe abdominal pain and nausea and underwent EGD on 4/15/2021.  He was found to have esophagitis bleeding gastric erosions.  Path was negative.  He was put on Protonix 40 mg twice daily and Carafate 4 times daily.  He was also found to have thrush and was started on Diflucan.  He does have a history of gastroparesis and this was acting up in the hospital.  He was started on Reglan 5 mg 3 times a day before meals and before bedtime.  He is happy to report since getting home from the hospital he is doing so much better.  He is taking the Reglan and he denies any unwanted side effects at this time.  No signs of tardive dyskinesia.  He tells me he is taking it 4 times a day and it is really working well for him.  He reports his bowels are moving well also.  No nausea no vomiting.  He is not needing to take the Phenergan.  GERD seems better on Protonix 40 mg twice daily and Carafate 4 times daily.  He tells me he needs refills on all of his meds today.  He denies any dysphagia, reflux, abdominal pain, nausea and vomiting, diarrhea, constipation, to bleeding or melena.  Admits his appetite is good and his weight is stable.  His next colonoscopy will be due in 2023.  Past Medical History:   Diagnosis Date   • Anxiety    • Anxiety and depression    • Colon polyp 8/30/2017    1   • Depression    • Diabetes type 1, uncontrolled (CMS/HCC)     diagnosed at age 17   • Dyspepsia    • Family hx of  colon cancer    • Gastroparesis    • GERD (gastroesophageal reflux disease)        Past Surgical History:   Procedure Laterality Date   • COLONOSCOPY N/A 8/30/2017    One 3 mm polyp in the sigmoid colon, Granularity in the terminal ileum   • ENDOSCOPY N/A 4/15/2021    Procedure: ESOPHAGOGASTRODUODENOSCOPY with biopsies;  Surgeon: Da Fleming MD;  Location: [facility] ENDOSCOPY;  Service: Gastroenterology;  Laterality: N/A;  PRE:   nausea, vomiting, hematemesis  POST:  reflux esophagitis, erosive gastropathy, scalloped mucosa in duodenum ? celiac       Scheduled Meds:    Continuous Infusions:No current facility-administered medications for this visit.      PRN Meds:.    No Known Allergies    Social History     Socioeconomic History   • Marital status: Significant Other     Spouse name: Not on file   • Number of children: Not on file   • Years of education: Not on file   • Highest education level: Not on file   Tobacco Use   • Smoking status: Current Every Day Smoker     Packs/day: 0.50     Years: 15.00     Pack years: 7.50     Types: Cigarettes     Start date: 2005   • Smokeless tobacco: Never Used   Vaping Use   • Vaping Use: Never used   Substance and Sexual Activity   • Alcohol use: Yes     Comment: 3-4 beers on weekend   • Drug use: Yes     Frequency: 7.0 times per week     Types: Marijuana     Comment: DAILY   • Sexual activity: Defer       Family History   Problem Relation Age of Onset   • Cancer Mother    • Hypertension Father    • Depression Paternal Uncle    • Hypertension Maternal Grandmother    • Irritable bowel syndrome Maternal Grandmother    • Colon cancer Maternal Grandmother    • Hypertension Maternal Grandfather    • Alcohol abuse Maternal Grandfather    • Hypertension Paternal Grandmother    • Cancer Paternal Grandmother    • COPD Paternal Grandmother    • Depression Paternal Grandmother    • Heart disease Paternal Grandfather    • Hypertension Paternal Grandfather        Review of Systems    Constitutional: Negative for appetite change, chills, diaphoresis, fatigue, fever and unexpected weight change.   HENT: Negative for nosebleeds, postnasal drip, sore throat, trouble swallowing and voice change.    Respiratory: Negative for cough, choking, chest tightness, shortness of breath and wheezing.    Cardiovascular: Negative for chest pain, palpitations and leg swelling.   Gastrointestinal: Negative for abdominal distention, abdominal pain, anal bleeding, blood in stool, constipation, diarrhea, nausea, rectal pain and vomiting.   Endocrine: Negative for polydipsia, polyphagia and polyuria.   Musculoskeletal: Negative for gait problem.   Skin: Negative for rash and wound.   Allergic/Immunologic: Negative for food allergies.   Neurological: Negative for dizziness, speech difficulty and light-headedness.   Psychiatric/Behavioral: Negative for confusion, self-injury, sleep disturbance and suicidal ideas.       Vitals:    04/28/21 1403   Temp: 97.3 °F (36.3 °C)       Physical Exam  Constitutional:       General: He is not in acute distress.     Appearance: He is well-developed. He is not ill-appearing.   HENT:      Head: Normocephalic.   Eyes:      Pupils: Pupils are equal, round, and reactive to light.   Cardiovascular:      Rate and Rhythm: Normal rate and regular rhythm.      Heart sounds: Normal heart sounds.   Pulmonary:      Effort: Pulmonary effort is normal.      Breath sounds: Normal breath sounds.   Abdominal:      General: Bowel sounds are normal. There is no distension.      Palpations: Abdomen is soft. There is no mass.      Tenderness: There is no abdominal tenderness. There is no guarding or rebound.      Hernia: No hernia is present.   Musculoskeletal:         General: Normal range of motion.   Skin:     General: Skin is warm and dry.   Neurological:      Mental Status: He is alert and oriented to person, place, and time.   Psychiatric:         Speech: Speech normal.         Behavior: Behavior  normal.         Judgment: Judgment normal.         No radiology results for the last 7 days     Assessment and plan     1. Acute upper GI bleed    2. Gastroesophageal reflux disease with esophagitis and hemorrhage    3. Acute gastric erosion    4. Gastroparesis    5. Oral candidiasis    Reviewed hospital records with him today.  Overall he is doing so much better.  Bowels are moving well.  No signs of GI bleeding.  Gastroparesis seems very well controlled on Reglan 5 mg 4 times a day.  No signs of tardive dyskinesia.  No unwanted side effects.  Continue gastroparesis diet.  GERD seems well controlled on Protonix 40 mg twice daily.  Continue Carafate as needed.  Continue GERD precautions.  Patient has finished the Diflucan for his thrush and is feeling much better.  Reviewed most recent labs with him today.  Labs on discharge were much more stable.  Patient to call the office with any issues.  Patient to follow-up with me in 1 month.  Patient to go ahead make follow-up with Dr. Aguayo in 4 months.  Patient is agreeable to the plan.

## 2021-04-30 ENCOUNTER — TELEPHONE (OUTPATIENT)
Dept: GASTROENTEROLOGY | Facility: CLINIC | Age: 31
End: 2021-04-30

## 2021-04-30 NOTE — TELEPHONE ENCOUNTER
PA for Pantoprazole sent to [facility] via [facility]. Waiting plan decision.

## 2021-05-16 LAB — FUNGUS WND CULT: ABNORMAL

## 2021-05-18 ENCOUNTER — TELEPHONE (OUTPATIENT)
Dept: GASTROENTEROLOGY | Facility: CLINIC | Age: 31
End: 2021-05-18

## 2021-05-23 ENCOUNTER — HOSPITAL ENCOUNTER (INPATIENT)
Facility: HOSPITAL | Age: 31
LOS: 5 days | Discharge: HOME | End: 2021-05-28
Attending: EMERGENCY MEDICINE | Admitting: INTERNAL MEDICINE

## 2021-05-23 ENCOUNTER — APPOINTMENT (OUTPATIENT)
Dept: CT IMAGING | Facility: HOSPITAL | Age: 31
End: 2021-05-23

## 2021-05-23 DIAGNOSIS — E10.65 TYPE 1 DIABETES MELLITUS WITH HYPERGLYCEMIA (HCC): Primary | ICD-10-CM

## 2021-05-23 DIAGNOSIS — D72.829 LEUKOCYTOSIS, UNSPECIFIED TYPE: ICD-10-CM

## 2021-05-23 DIAGNOSIS — E10.65 HYPERGLYCEMIA DUE TO TYPE 1 DIABETES MELLITUS (HCC): ICD-10-CM

## 2021-05-23 DIAGNOSIS — R11.2 NON-INTRACTABLE VOMITING WITH NAUSEA, UNSPECIFIED VOMITING TYPE: ICD-10-CM

## 2021-05-23 LAB
ALBUMIN SERPL-MCNC: 4.9 G/DL (ref 3.5–5.2)
ALBUMIN/GLOB SERPL: 1.6 G/DL
ALP SERPL-CCNC: 90 U/L (ref 39–117)
ALT SERPL W P-5'-P-CCNC: 17 U/L (ref 1–41)
ANION GAP SERPL CALCULATED.3IONS-SCNC: 21.7 MMOL/L (ref 5–15)
ARTERIAL PATENCY WRIST A: POSITIVE
AST SERPL-CCNC: 25 U/L (ref 1–40)
ATMOSPHERIC PRESS: 753.8 MMHG
BACTERIA UR QL AUTO: ABNORMAL /HPF
BASE EXCESS BLDA CALC-SCNC: 3 MMOL/L (ref 0–2)
BASOPHILS # BLD AUTO: 0.03 10*3/MM3 (ref 0–0.2)
BASOPHILS NFR BLD AUTO: 0.2 % (ref 0–1.5)
BDY SITE: ABNORMAL
BILIRUB SERPL-MCNC: 1.1 MG/DL (ref 0–1.2)
BILIRUB UR QL STRIP: NEGATIVE
BUN SERPL-MCNC: 16 MG/DL (ref 6–20)
BUN/CREAT SERPL: 13 (ref 7–25)
CALCIUM SPEC-SCNC: 9.9 MG/DL (ref 8.6–10.5)
CHLORIDE SERPL-SCNC: 92 MMOL/L (ref 98–107)
CLARITY UR: CLEAR
CO2 SERPL-SCNC: 25.3 MMOL/L (ref 22–29)
COLOR UR: YELLOW
CREAT SERPL-MCNC: 1.23 MG/DL (ref 0.76–1.27)
D-LACTATE SERPL-SCNC: 2 MMOL/L (ref 0.5–2)
D-LACTATE SERPL-SCNC: 3.1 MMOL/L (ref 0.5–2)
DEPRECATED RDW RBC AUTO: 45.2 FL (ref 37–54)
EOSINOPHIL # BLD AUTO: 0 10*3/MM3 (ref 0–0.4)
EOSINOPHIL NFR BLD AUTO: 0 % (ref 0.3–6.2)
ERYTHROCYTE [DISTWIDTH] IN BLOOD BY AUTOMATED COUNT: 13.5 % (ref 12.3–15.4)
GAS FLOW AIRWAY: 1.5 LPM
GFR SERPL CREATININE-BSD FRML MDRD: 69 ML/MIN/1.73
GLOBULIN UR ELPH-MCNC: 3.1 GM/DL
GLUCOSE BLDC GLUCOMTR-MCNC: 306 MG/DL (ref 70–130)
GLUCOSE BLDC GLUCOMTR-MCNC: 337 MG/DL (ref 70–130)
GLUCOSE BLDC GLUCOMTR-MCNC: 453 MG/DL (ref 70–130)
GLUCOSE BLDC GLUCOMTR-MCNC: 460 MG/DL (ref 70–130)
GLUCOSE BLDC GLUCOMTR-MCNC: 492 MG/DL (ref 70–130)
GLUCOSE SERPL-MCNC: 298 MG/DL (ref 65–99)
GLUCOSE UR STRIP-MCNC: ABNORMAL MG/DL
HCO3 BLDA-SCNC: 28.4 MMOL/L (ref 22–28)
HCT VFR BLD AUTO: 46.3 % (ref 37.5–51)
HGB BLD-MCNC: 15.6 G/DL (ref 13–17.7)
HGB UR QL STRIP.AUTO: ABNORMAL
HOLD SPECIMEN: NORMAL
HOLD SPECIMEN: NORMAL
HYALINE CASTS UR QL AUTO: ABNORMAL /LPF
KETONES UR QL STRIP: ABNORMAL
LEUKOCYTE ESTERASE UR QL STRIP.AUTO: NEGATIVE
LIPASE SERPL-CCNC: 15 U/L (ref 13–60)
LYMPHOCYTES # BLD AUTO: 1.54 10*3/MM3 (ref 0.7–3.1)
LYMPHOCYTES NFR BLD AUTO: 7.8 % (ref 19.6–45.3)
MAGNESIUM SERPL-MCNC: 1.8 MG/DL (ref 1.6–2.6)
MCH RBC QN AUTO: 31.1 PG (ref 26.6–33)
MCHC RBC AUTO-ENTMCNC: 33.7 G/DL (ref 31.5–35.7)
MCV RBC AUTO: 92.2 FL (ref 79–97)
MODALITY: ABNORMAL
MONOCYTES # BLD AUTO: 0.83 10*3/MM3 (ref 0.1–0.9)
MONOCYTES NFR BLD AUTO: 4.2 % (ref 5–12)
NEUTROPHILS NFR BLD AUTO: 17.33 10*3/MM3 (ref 1.7–7)
NEUTROPHILS NFR BLD AUTO: 87.3 % (ref 42.7–76)
NITRITE UR QL STRIP: NEGATIVE
OSMOLALITY SERPL: 305 MOSM/KG (ref 275–300)
PCO2 BLDA: 45.6 MM HG (ref 35–45)
PH BLDA: 7.4 PH UNITS (ref 7.35–7.45)
PH UR STRIP.AUTO: 8.5 [PH] (ref 5–8)
PLATELET # BLD AUTO: 288 10*3/MM3 (ref 140–450)
PMV BLD AUTO: 11 FL (ref 6–12)
PO2 BLDA: 109 MM HG (ref 80–100)
POTASSIUM SERPL-SCNC: 4.5 MMOL/L (ref 3.5–5.2)
PROT SERPL-MCNC: 8 G/DL (ref 6–8.5)
PROT UR QL STRIP: ABNORMAL
RBC # BLD AUTO: 5.02 10*6/MM3 (ref 4.14–5.8)
RBC # UR: ABNORMAL /HPF
REF LAB TEST METHOD: ABNORMAL
SAO2 % BLDCOA: 98.2 % (ref 92–99)
SARS-COV-2 ORF1AB RESP QL NAA+PROBE: NOT DETECTED
SODIUM SERPL-SCNC: 139 MMOL/L (ref 136–145)
SP GR UR STRIP: >=1.03 (ref 1–1.03)
SQUAMOUS #/AREA URNS HPF: ABNORMAL /HPF
TOTAL RATE: 20 BREATHS/MINUTE
UROBILINOGEN UR QL STRIP: ABNORMAL
WBC # BLD AUTO: 19.82 10*3/MM3 (ref 3.4–10.8)
WBC UR QL AUTO: ABNORMAL /HPF
WHOLE BLOOD HOLD SPECIMEN: NORMAL
WHOLE BLOOD HOLD SPECIMEN: NORMAL

## 2021-05-23 PROCEDURE — 82962 GLUCOSE BLOOD TEST: CPT

## 2021-05-23 PROCEDURE — 85025 COMPLETE CBC W/AUTO DIFF WBC: CPT | Performed by: NURSE PRACTITIONER

## 2021-05-23 PROCEDURE — 81001 URINALYSIS AUTO W/SCOPE: CPT | Performed by: NURSE PRACTITIONER

## 2021-05-23 PROCEDURE — U0004 COV-19 TEST NON-CDC HGH THRU: HCPCS | Performed by: INTERNAL MEDICINE

## 2021-05-23 PROCEDURE — 83690 ASSAY OF LIPASE: CPT | Performed by: NURSE PRACTITIONER

## 2021-05-23 PROCEDURE — 80053 COMPREHEN METABOLIC PANEL: CPT | Performed by: NURSE PRACTITIONER

## 2021-05-23 PROCEDURE — 83930 ASSAY OF BLOOD OSMOLALITY: CPT | Performed by: NURSE PRACTITIONER

## 2021-05-23 PROCEDURE — 74177 CT ABD & PELVIS W/CONTRAST: CPT

## 2021-05-23 PROCEDURE — 83735 ASSAY OF MAGNESIUM: CPT | Performed by: NURSE PRACTITIONER

## 2021-05-23 PROCEDURE — 36600 WITHDRAWAL OF ARTERIAL BLOOD: CPT

## 2021-05-23 PROCEDURE — 63710000001 INSULIN LISPRO (HUMAN) PER 5 UNITS: Performed by: INTERNAL MEDICINE

## 2021-05-23 PROCEDURE — 99284 EMERGENCY DEPT VISIT MOD MDM: CPT

## 2021-05-23 PROCEDURE — 25010000002 IOPAMIDOL 61 % SOLUTION: Performed by: EMERGENCY MEDICINE

## 2021-05-23 PROCEDURE — 85007 BL SMEAR W/DIFF WBC COUNT: CPT | Performed by: NURSE PRACTITIONER

## 2021-05-23 PROCEDURE — 25010000002 METOCLOPRAMIDE PER 10 MG: Performed by: INTERNAL MEDICINE

## 2021-05-23 PROCEDURE — 83605 ASSAY OF LACTIC ACID: CPT | Performed by: NURSE PRACTITIONER

## 2021-05-23 PROCEDURE — 87040 BLOOD CULTURE FOR BACTERIA: CPT | Performed by: NURSE PRACTITIONER

## 2021-05-23 PROCEDURE — 25010000002 ONDANSETRON PER 1 MG: Performed by: NURSE PRACTITIONER

## 2021-05-23 PROCEDURE — 82803 BLOOD GASES ANY COMBINATION: CPT

## 2021-05-23 PROCEDURE — 63710000001 INSULIN GLARGINE PER 5 UNITS: Performed by: INTERNAL MEDICINE

## 2021-05-23 PROCEDURE — 25010000002 PROCHLORPERAZINE 10 MG/2ML SOLUTION: Performed by: NURSE PRACTITIONER

## 2021-05-23 PROCEDURE — 25010000002 ONDANSETRON PER 1 MG: Performed by: INTERNAL MEDICINE

## 2021-05-23 PROCEDURE — 63710000001 INSULIN REGULAR HUMAN PER 5 UNITS: Performed by: NURSE PRACTITIONER

## 2021-05-23 RX ORDER — METOCLOPRAMIDE 5 MG/1
5 TABLET ORAL EVERY 6 HOURS PRN
Status: DISCONTINUED | OUTPATIENT
Start: 2021-05-23 | End: 2021-05-24 | Stop reason: ALTCHOICE

## 2021-05-23 RX ORDER — SODIUM CHLORIDE 9 MG/ML
125 INJECTION, SOLUTION INTRAVENOUS CONTINUOUS
Status: DISCONTINUED | OUTPATIENT
Start: 2021-05-23 | End: 2021-05-28 | Stop reason: HOSPADM

## 2021-05-23 RX ORDER — ONDANSETRON 2 MG/ML
4 INJECTION INTRAMUSCULAR; INTRAVENOUS ONCE
Status: COMPLETED | OUTPATIENT
Start: 2021-05-23 | End: 2021-05-23

## 2021-05-23 RX ORDER — SUCRALFATE 1 G/1
1 TABLET ORAL
Status: DISCONTINUED | OUTPATIENT
Start: 2021-05-23 | End: 2021-05-28 | Stop reason: HOSPADM

## 2021-05-23 RX ORDER — INSULIN PUMP CONTROLLER
0.85 EACH MISCELLANEOUS
COMMUNITY

## 2021-05-23 RX ORDER — ONDANSETRON 2 MG/ML
4 INJECTION INTRAMUSCULAR; INTRAVENOUS EVERY 6 HOURS PRN
Status: DISCONTINUED | OUTPATIENT
Start: 2021-05-23 | End: 2021-05-24

## 2021-05-23 RX ORDER — SODIUM CHLORIDE 0.9 % (FLUSH) 0.9 %
10 SYRINGE (ML) INJECTION AS NEEDED
Status: DISCONTINUED | OUTPATIENT
Start: 2021-05-23 | End: 2021-05-28 | Stop reason: HOSPADM

## 2021-05-23 RX ORDER — PANTOPRAZOLE SODIUM 40 MG/1
40 TABLET, DELAYED RELEASE ORAL
Status: DISCONTINUED | OUTPATIENT
Start: 2021-05-23 | End: 2021-05-25

## 2021-05-23 RX ORDER — METOCLOPRAMIDE HYDROCHLORIDE 5 MG/ML
5 INJECTION INTRAMUSCULAR; INTRAVENOUS EVERY 6 HOURS PRN
Status: DISCONTINUED | OUTPATIENT
Start: 2021-05-23 | End: 2021-05-24 | Stop reason: ALTCHOICE

## 2021-05-23 RX ORDER — METOCLOPRAMIDE 5 MG/1
5 TABLET ORAL
Status: DISCONTINUED | OUTPATIENT
Start: 2021-05-23 | End: 2021-05-23

## 2021-05-23 RX ORDER — INSULIN LISPRO 100 [IU]/ML
4 INJECTION, SOLUTION INTRAVENOUS; SUBCUTANEOUS
Status: DISCONTINUED | OUTPATIENT
Start: 2021-05-23 | End: 2021-05-28 | Stop reason: HOSPADM

## 2021-05-23 RX ORDER — INSULIN LISPRO 100 [IU]/ML
0-7 INJECTION, SOLUTION INTRAVENOUS; SUBCUTANEOUS
Status: DISCONTINUED | OUTPATIENT
Start: 2021-05-24 | End: 2021-05-23

## 2021-05-23 RX ORDER — DULOXETIN HYDROCHLORIDE 30 MG/1
30 CAPSULE, DELAYED RELEASE ORAL DAILY
Status: DISCONTINUED | OUTPATIENT
Start: 2021-05-23 | End: 2021-05-28 | Stop reason: HOSPADM

## 2021-05-23 RX ORDER — ROPINIROLE 0.5 MG/1
0.5 TABLET, FILM COATED ORAL DAILY PRN
Status: DISCONTINUED | OUTPATIENT
Start: 2021-05-23 | End: 2021-05-28 | Stop reason: HOSPADM

## 2021-05-23 RX ORDER — INSULIN LISPRO 100 [IU]/ML
0-7 INJECTION, SOLUTION INTRAVENOUS; SUBCUTANEOUS
Status: DISCONTINUED | OUTPATIENT
Start: 2021-05-23 | End: 2021-05-28 | Stop reason: HOSPADM

## 2021-05-23 RX ORDER — NICOTINE POLACRILEX 4 MG
15 LOZENGE BUCCAL
Status: DISCONTINUED | OUTPATIENT
Start: 2021-05-23 | End: 2021-05-28 | Stop reason: HOSPADM

## 2021-05-23 RX ORDER — INSULIN GLARGINE 100 [IU]/ML
10 INJECTION, SOLUTION SUBCUTANEOUS NIGHTLY
Status: DISCONTINUED | OUTPATIENT
Start: 2021-05-23 | End: 2021-05-24

## 2021-05-23 RX ORDER — LOPERAMIDE HYDROCHLORIDE 2 MG/1
4 CAPSULE ORAL 4 TIMES DAILY PRN
Status: DISCONTINUED | OUTPATIENT
Start: 2021-05-23 | End: 2021-05-28 | Stop reason: HOSPADM

## 2021-05-23 RX ORDER — DEXTROSE MONOHYDRATE 25 G/50ML
25 INJECTION, SOLUTION INTRAVENOUS
Status: DISCONTINUED | OUTPATIENT
Start: 2021-05-23 | End: 2021-05-28 | Stop reason: HOSPADM

## 2021-05-23 RX ORDER — PROCHLORPERAZINE EDISYLATE 5 MG/ML
5 INJECTION INTRAMUSCULAR; INTRAVENOUS ONCE
Status: COMPLETED | OUTPATIENT
Start: 2021-05-23 | End: 2021-05-23

## 2021-05-23 RX ADMIN — ONDANSETRON 4 MG: 2 INJECTION INTRAMUSCULAR; INTRAVENOUS at 20:44

## 2021-05-23 RX ADMIN — SODIUM CHLORIDE 1000 ML: 9 INJECTION, SOLUTION INTRAVENOUS at 14:01

## 2021-05-23 RX ADMIN — SODIUM CHLORIDE 1000 ML: 9 INJECTION, SOLUTION INTRAVENOUS at 15:05

## 2021-05-23 RX ADMIN — SODIUM CHLORIDE 75 ML/HR: 9 INJECTION, SOLUTION INTRAVENOUS at 19:59

## 2021-05-23 RX ADMIN — ONDANSETRON 4 MG: 2 INJECTION INTRAMUSCULAR; INTRAVENOUS at 13:34

## 2021-05-23 RX ADMIN — INSULIN HUMAN 3 UNITS: 100 INJECTION, SOLUTION PARENTERAL at 15:45

## 2021-05-23 RX ADMIN — IOPAMIDOL 85 ML: 612 INJECTION, SOLUTION INTRAVENOUS at 15:25

## 2021-05-23 RX ADMIN — INSULIN GLARGINE 10 UNITS: 100 INJECTION, SOLUTION SUBCUTANEOUS at 20:44

## 2021-05-23 RX ADMIN — INSULIN LISPRO 7 UNITS: 100 INJECTION, SOLUTION INTRAVENOUS; SUBCUTANEOUS at 20:44

## 2021-05-23 RX ADMIN — METOCLOPRAMIDE HYDROCHLORIDE 5 MG: 5 INJECTION INTRAMUSCULAR; INTRAVENOUS at 20:52

## 2021-05-23 RX ADMIN — PROCHLORPERAZINE EDISYLATE 5 MG: 5 INJECTION INTRAMUSCULAR; INTRAVENOUS at 16:33

## 2021-05-23 RX ADMIN — INSULIN LISPRO 4 UNITS: 100 INJECTION, SOLUTION INTRAVENOUS; SUBCUTANEOUS at 20:44

## 2021-05-24 PROBLEM — D72.829 LEUKOCYTOSIS: Status: ACTIVE | Noted: 2021-05-24

## 2021-05-24 LAB
GLUCOSE BLDC GLUCOMTR-MCNC: 174 MG/DL (ref 70–130)
GLUCOSE BLDC GLUCOMTR-MCNC: 203 MG/DL (ref 70–130)
GLUCOSE BLDC GLUCOMTR-MCNC: 278 MG/DL (ref 70–130)
GLUCOSE BLDC GLUCOMTR-MCNC: 314 MG/DL (ref 70–130)
GLUCOSE BLDC GLUCOMTR-MCNC: 314 MG/DL (ref 70–130)

## 2021-05-24 PROCEDURE — 82962 GLUCOSE BLOOD TEST: CPT

## 2021-05-24 PROCEDURE — 99253 IP/OBS CNSLTJ NEW/EST LOW 45: CPT | Performed by: INTERNAL MEDICINE

## 2021-05-24 PROCEDURE — 25010000002 METOCLOPRAMIDE PER 10 MG: Performed by: INTERNAL MEDICINE

## 2021-05-24 PROCEDURE — 63710000001 INSULIN GLARGINE PER 5 UNITS: Performed by: HOSPITALIST

## 2021-05-24 PROCEDURE — 25010000002 ONDANSETRON PER 1 MG: Performed by: INTERNAL MEDICINE

## 2021-05-24 PROCEDURE — 63710000001 INSULIN LISPRO (HUMAN) PER 5 UNITS: Performed by: INTERNAL MEDICINE

## 2021-05-24 RX ORDER — INSULIN GLARGINE 100 [IU]/ML
15 INJECTION, SOLUTION SUBCUTANEOUS NIGHTLY
Status: DISCONTINUED | OUTPATIENT
Start: 2021-05-24 | End: 2021-05-25

## 2021-05-24 RX ORDER — ONDANSETRON 2 MG/ML
4 INJECTION INTRAMUSCULAR; INTRAVENOUS EVERY 4 HOURS
Status: COMPLETED | OUTPATIENT
Start: 2021-05-24 | End: 2021-05-26

## 2021-05-24 RX ORDER — METOCLOPRAMIDE HYDROCHLORIDE 5 MG/ML
10 INJECTION INTRAMUSCULAR; INTRAVENOUS EVERY 6 HOURS
Status: DISCONTINUED | OUTPATIENT
Start: 2021-05-24 | End: 2021-05-28 | Stop reason: HOSPADM

## 2021-05-24 RX ADMIN — INSULIN LISPRO 4 UNITS: 100 INJECTION, SOLUTION INTRAVENOUS; SUBCUTANEOUS at 12:12

## 2021-05-24 RX ADMIN — INSULIN LISPRO 4 UNITS: 100 INJECTION, SOLUTION INTRAVENOUS; SUBCUTANEOUS at 08:12

## 2021-05-24 RX ADMIN — DULOXETINE HYDROCHLORIDE 30 MG: 30 CAPSULE, DELAYED RELEASE ORAL at 08:12

## 2021-05-24 RX ADMIN — METOCLOPRAMIDE HYDROCHLORIDE 10 MG: 5 INJECTION INTRAMUSCULAR; INTRAVENOUS at 17:03

## 2021-05-24 RX ADMIN — INSULIN GLARGINE 15 UNITS: 100 INJECTION, SOLUTION SUBCUTANEOUS at 21:12

## 2021-05-24 RX ADMIN — INSULIN LISPRO 5 UNITS: 100 INJECTION, SOLUTION INTRAVENOUS; SUBCUTANEOUS at 21:11

## 2021-05-24 RX ADMIN — SODIUM CHLORIDE 75 ML/HR: 9 INJECTION, SOLUTION INTRAVENOUS at 09:46

## 2021-05-24 RX ADMIN — INSULIN LISPRO 5 UNITS: 100 INJECTION, SOLUTION INTRAVENOUS; SUBCUTANEOUS at 12:12

## 2021-05-24 RX ADMIN — SUCRALFATE 1 G: 1 TABLET ORAL at 08:12

## 2021-05-24 RX ADMIN — ONDANSETRON 4 MG: 2 INJECTION INTRAMUSCULAR; INTRAVENOUS at 21:11

## 2021-05-24 RX ADMIN — PANTOPRAZOLE SODIUM 40 MG: 40 TABLET, DELAYED RELEASE ORAL at 08:12

## 2021-05-24 RX ADMIN — ONDANSETRON 4 MG: 2 INJECTION INTRAMUSCULAR; INTRAVENOUS at 17:03

## 2021-05-25 LAB
ANION GAP SERPL CALCULATED.3IONS-SCNC: 15.2 MMOL/L (ref 5–15)
BASOPHILS # BLD AUTO: 0.02 10*3/MM3 (ref 0–0.2)
BASOPHILS NFR BLD AUTO: 0.1 % (ref 0–1.5)
BUN SERPL-MCNC: 14 MG/DL (ref 6–20)
BUN/CREAT SERPL: 19.2 (ref 7–25)
CALCIUM SPEC-SCNC: 8.3 MG/DL (ref 8.6–10.5)
CHLORIDE SERPL-SCNC: 98 MMOL/L (ref 98–107)
CO2 SERPL-SCNC: 22.8 MMOL/L (ref 22–29)
CREAT SERPL-MCNC: 0.73 MG/DL (ref 0.76–1.27)
DEPRECATED RDW RBC AUTO: 43.2 FL (ref 37–54)
EOSINOPHIL # BLD AUTO: 0 10*3/MM3 (ref 0–0.4)
EOSINOPHIL NFR BLD AUTO: 0 % (ref 0.3–6.2)
ERYTHROCYTE [DISTWIDTH] IN BLOOD BY AUTOMATED COUNT: 13.1 % (ref 12.3–15.4)
GFR SERPL CREATININE-BSD FRML MDRD: 126 ML/MIN/1.73
GLUCOSE BLDC GLUCOMTR-MCNC: 106 MG/DL (ref 70–130)
GLUCOSE BLDC GLUCOMTR-MCNC: 139 MG/DL (ref 70–130)
GLUCOSE BLDC GLUCOMTR-MCNC: 150 MG/DL (ref 70–130)
GLUCOSE BLDC GLUCOMTR-MCNC: 184 MG/DL (ref 70–130)
GLUCOSE BLDC GLUCOMTR-MCNC: 246 MG/DL (ref 70–130)
GLUCOSE SERPL-MCNC: 264 MG/DL (ref 65–99)
HCT VFR BLD AUTO: 37.7 % (ref 37.5–51)
HGB BLD-MCNC: 12.8 G/DL (ref 13–17.7)
IMM GRANULOCYTES # BLD AUTO: 0.05 10*3/MM3 (ref 0–0.05)
IMM GRANULOCYTES NFR BLD AUTO: 0.3 % (ref 0–0.5)
LYMPHOCYTES # BLD AUTO: 1.77 10*3/MM3 (ref 0.7–3.1)
LYMPHOCYTES NFR BLD AUTO: 10 % (ref 19.6–45.3)
MCH RBC QN AUTO: 30.9 PG (ref 26.6–33)
MCHC RBC AUTO-ENTMCNC: 34 G/DL (ref 31.5–35.7)
MCV RBC AUTO: 91.1 FL (ref 79–97)
MONOCYTES # BLD AUTO: 1.15 10*3/MM3 (ref 0.1–0.9)
MONOCYTES NFR BLD AUTO: 6.5 % (ref 5–12)
NEUTROPHILS NFR BLD AUTO: 14.74 10*3/MM3 (ref 1.7–7)
NEUTROPHILS NFR BLD AUTO: 83.1 % (ref 42.7–76)
NRBC BLD AUTO-RTO: 0 /100 WBC (ref 0–0.2)
PLATELET # BLD AUTO: 271 10*3/MM3 (ref 140–450)
PMV BLD AUTO: 10.6 FL (ref 6–12)
POTASSIUM SERPL-SCNC: 3.8 MMOL/L (ref 3.5–5.2)
RBC # BLD AUTO: 4.14 10*6/MM3 (ref 4.14–5.8)
SODIUM SERPL-SCNC: 136 MMOL/L (ref 136–145)
WBC # BLD AUTO: 17.73 10*3/MM3 (ref 3.4–10.8)

## 2021-05-25 PROCEDURE — 25010000002 PROMETHAZINE PER 50 MG: Performed by: INTERNAL MEDICINE

## 2021-05-25 PROCEDURE — 82962 GLUCOSE BLOOD TEST: CPT

## 2021-05-25 PROCEDURE — 80048 BASIC METABOLIC PNL TOTAL CA: CPT | Performed by: HOSPITALIST

## 2021-05-25 PROCEDURE — 25010000002 METOCLOPRAMIDE PER 10 MG: Performed by: INTERNAL MEDICINE

## 2021-05-25 PROCEDURE — 63710000001 INSULIN GLARGINE PER 5 UNITS: Performed by: HOSPITALIST

## 2021-05-25 PROCEDURE — 25010000002 HYDRALAZINE PER 20 MG: Performed by: HOSPITALIST

## 2021-05-25 PROCEDURE — 85025 COMPLETE CBC W/AUTO DIFF WBC: CPT | Performed by: HOSPITALIST

## 2021-05-25 PROCEDURE — 25010000002 ONDANSETRON PER 1 MG: Performed by: INTERNAL MEDICINE

## 2021-05-25 PROCEDURE — 63710000001 INSULIN LISPRO (HUMAN) PER 5 UNITS: Performed by: INTERNAL MEDICINE

## 2021-05-25 PROCEDURE — 99232 SBSQ HOSP IP/OBS MODERATE 35: CPT | Performed by: INTERNAL MEDICINE

## 2021-05-25 RX ORDER — HYDRALAZINE HYDROCHLORIDE 20 MG/ML
10 INJECTION INTRAMUSCULAR; INTRAVENOUS EVERY 6 HOURS PRN
Status: DISCONTINUED | OUTPATIENT
Start: 2021-05-25 | End: 2021-05-28 | Stop reason: HOSPADM

## 2021-05-25 RX ORDER — INSULIN GLARGINE 100 [IU]/ML
20 INJECTION, SOLUTION SUBCUTANEOUS NIGHTLY
Status: DISCONTINUED | OUTPATIENT
Start: 2021-05-25 | End: 2021-05-28 | Stop reason: HOSPADM

## 2021-05-25 RX ORDER — PANTOPRAZOLE SODIUM 40 MG/10ML
40 INJECTION, POWDER, LYOPHILIZED, FOR SOLUTION INTRAVENOUS
Status: DISCONTINUED | OUTPATIENT
Start: 2021-05-25 | End: 2021-05-28

## 2021-05-25 RX ADMIN — ONDANSETRON 4 MG: 2 INJECTION INTRAMUSCULAR; INTRAVENOUS at 11:54

## 2021-05-25 RX ADMIN — ONDANSETRON 4 MG: 2 INJECTION INTRAMUSCULAR; INTRAVENOUS at 04:56

## 2021-05-25 RX ADMIN — PANTOPRAZOLE SODIUM 40 MG: 40 INJECTION, POWDER, FOR SOLUTION INTRAVENOUS at 16:31

## 2021-05-25 RX ADMIN — PANTOPRAZOLE SODIUM 40 MG: 40 INJECTION, POWDER, FOR SOLUTION INTRAVENOUS at 09:42

## 2021-05-25 RX ADMIN — METOCLOPRAMIDE HYDROCHLORIDE 10 MG: 5 INJECTION INTRAMUSCULAR; INTRAVENOUS at 00:58

## 2021-05-25 RX ADMIN — PROMETHAZINE HYDROCHLORIDE 12.5 MG: 25 INJECTION INTRAMUSCULAR; INTRAVENOUS at 18:41

## 2021-05-25 RX ADMIN — INSULIN LISPRO 4 UNITS: 100 INJECTION, SOLUTION INTRAVENOUS; SUBCUTANEOUS at 16:31

## 2021-05-25 RX ADMIN — METOCLOPRAMIDE HYDROCHLORIDE 10 MG: 5 INJECTION INTRAMUSCULAR; INTRAVENOUS at 11:54

## 2021-05-25 RX ADMIN — METOCLOPRAMIDE HYDROCHLORIDE 10 MG: 5 INJECTION INTRAMUSCULAR; INTRAVENOUS at 06:25

## 2021-05-25 RX ADMIN — INSULIN GLARGINE 20 UNITS: 100 INJECTION, SOLUTION SUBCUTANEOUS at 21:19

## 2021-05-25 RX ADMIN — ONDANSETRON 4 MG: 2 INJECTION INTRAMUSCULAR; INTRAVENOUS at 21:20

## 2021-05-25 RX ADMIN — SODIUM CHLORIDE 125 ML/HR: 9 INJECTION, SOLUTION INTRAVENOUS at 19:48

## 2021-05-25 RX ADMIN — INSULIN LISPRO 3 UNITS: 100 INJECTION, SOLUTION INTRAVENOUS; SUBCUTANEOUS at 08:13

## 2021-05-25 RX ADMIN — ONDANSETRON 4 MG: 2 INJECTION INTRAMUSCULAR; INTRAVENOUS at 18:12

## 2021-05-25 RX ADMIN — HYDRALAZINE HYDROCHLORIDE 10 MG: 20 INJECTION INTRAMUSCULAR; INTRAVENOUS at 16:28

## 2021-05-25 RX ADMIN — PROMETHAZINE HYDROCHLORIDE 12.5 MG: 25 INJECTION INTRAMUSCULAR; INTRAVENOUS at 09:42

## 2021-05-25 RX ADMIN — INSULIN LISPRO 4 UNITS: 100 INJECTION, SOLUTION INTRAVENOUS; SUBCUTANEOUS at 08:12

## 2021-05-25 RX ADMIN — SODIUM CHLORIDE, PRESERVATIVE FREE 10 ML: 5 INJECTION INTRAVENOUS at 21:20

## 2021-05-25 RX ADMIN — ONDANSETRON 4 MG: 2 INJECTION INTRAMUSCULAR; INTRAVENOUS at 08:13

## 2021-05-25 RX ADMIN — INSULIN LISPRO 2 UNITS: 100 INJECTION, SOLUTION INTRAVENOUS; SUBCUTANEOUS at 21:20

## 2021-05-25 RX ADMIN — INSULIN LISPRO 2 UNITS: 100 INJECTION, SOLUTION INTRAVENOUS; SUBCUTANEOUS at 11:54

## 2021-05-25 RX ADMIN — METOCLOPRAMIDE HYDROCHLORIDE 10 MG: 5 INJECTION INTRAMUSCULAR; INTRAVENOUS at 18:12

## 2021-05-25 RX ADMIN — ONDANSETRON 4 MG: 2 INJECTION INTRAMUSCULAR; INTRAVENOUS at 00:58

## 2021-05-25 RX ADMIN — SODIUM CHLORIDE 125 ML/HR: 9 INJECTION, SOLUTION INTRAVENOUS at 11:43

## 2021-05-25 RX ADMIN — INSULIN LISPRO 4 UNITS: 100 INJECTION, SOLUTION INTRAVENOUS; SUBCUTANEOUS at 11:54

## 2021-05-26 PROBLEM — F12.10 MARIJUANA ABUSE: Status: ACTIVE | Noted: 2021-05-26

## 2021-05-26 LAB
ANION GAP SERPL CALCULATED.3IONS-SCNC: 10 MMOL/L (ref 5–15)
BASOPHILS # BLD AUTO: 0.02 10*3/MM3 (ref 0–0.2)
BASOPHILS NFR BLD AUTO: 0.2 % (ref 0–1.5)
BUN SERPL-MCNC: 9 MG/DL (ref 6–20)
BUN/CREAT SERPL: 13 (ref 7–25)
CALCIUM SPEC-SCNC: 8.1 MG/DL (ref 8.6–10.5)
CHLORIDE SERPL-SCNC: 102 MMOL/L (ref 98–107)
CO2 SERPL-SCNC: 26 MMOL/L (ref 22–29)
CREAT SERPL-MCNC: 0.69 MG/DL (ref 0.76–1.27)
DEPRECATED RDW RBC AUTO: 41.1 FL (ref 37–54)
EOSINOPHIL # BLD AUTO: 0.01 10*3/MM3 (ref 0–0.4)
EOSINOPHIL NFR BLD AUTO: 0.1 % (ref 0.3–6.2)
ERYTHROCYTE [DISTWIDTH] IN BLOOD BY AUTOMATED COUNT: 12.9 % (ref 12.3–15.4)
GFR SERPL CREATININE-BSD FRML MDRD: 135 ML/MIN/1.73
GLUCOSE BLDC GLUCOMTR-MCNC: 129 MG/DL (ref 70–130)
GLUCOSE BLDC GLUCOMTR-MCNC: 149 MG/DL (ref 70–130)
GLUCOSE BLDC GLUCOMTR-MCNC: 215 MG/DL (ref 70–130)
GLUCOSE BLDC GLUCOMTR-MCNC: 229 MG/DL (ref 70–130)
GLUCOSE SERPL-MCNC: 120 MG/DL (ref 65–99)
HCT VFR BLD AUTO: 34 % (ref 37.5–51)
HGB BLD-MCNC: 11.8 G/DL (ref 13–17.7)
IMM GRANULOCYTES # BLD AUTO: 0.03 10*3/MM3 (ref 0–0.05)
IMM GRANULOCYTES NFR BLD AUTO: 0.3 % (ref 0–0.5)
LYMPHOCYTES # BLD AUTO: 1.91 10*3/MM3 (ref 0.7–3.1)
LYMPHOCYTES NFR BLD AUTO: 19 % (ref 19.6–45.3)
MCH RBC QN AUTO: 30.6 PG (ref 26.6–33)
MCHC RBC AUTO-ENTMCNC: 34.7 G/DL (ref 31.5–35.7)
MCV RBC AUTO: 88.1 FL (ref 79–97)
MONOCYTES # BLD AUTO: 0.97 10*3/MM3 (ref 0.1–0.9)
MONOCYTES NFR BLD AUTO: 9.7 % (ref 5–12)
NEUTROPHILS NFR BLD AUTO: 7.09 10*3/MM3 (ref 1.7–7)
NEUTROPHILS NFR BLD AUTO: 70.7 % (ref 42.7–76)
NRBC BLD AUTO-RTO: 0 /100 WBC (ref 0–0.2)
PLATELET # BLD AUTO: 265 10*3/MM3 (ref 140–450)
PMV BLD AUTO: 10 FL (ref 6–12)
POTASSIUM SERPL-SCNC: 3.4 MMOL/L (ref 3.5–5.2)
RBC # BLD AUTO: 3.86 10*6/MM3 (ref 4.14–5.8)
SODIUM SERPL-SCNC: 138 MMOL/L (ref 136–145)
WBC # BLD AUTO: 10.03 10*3/MM3 (ref 3.4–10.8)

## 2021-05-26 PROCEDURE — 63710000001 INSULIN GLARGINE PER 5 UNITS: Performed by: HOSPITALIST

## 2021-05-26 PROCEDURE — 82962 GLUCOSE BLOOD TEST: CPT

## 2021-05-26 PROCEDURE — 25010000002 ONDANSETRON PER 1 MG: Performed by: INTERNAL MEDICINE

## 2021-05-26 PROCEDURE — 25010000002 PROMETHAZINE PER 50 MG: Performed by: INTERNAL MEDICINE

## 2021-05-26 PROCEDURE — 80048 BASIC METABOLIC PNL TOTAL CA: CPT | Performed by: INTERNAL MEDICINE

## 2021-05-26 PROCEDURE — 63710000001 INSULIN LISPRO (HUMAN) PER 5 UNITS: Performed by: INTERNAL MEDICINE

## 2021-05-26 PROCEDURE — 25010000002 METOCLOPRAMIDE PER 10 MG: Performed by: INTERNAL MEDICINE

## 2021-05-26 PROCEDURE — 99232 SBSQ HOSP IP/OBS MODERATE 35: CPT | Performed by: INTERNAL MEDICINE

## 2021-05-26 PROCEDURE — 85025 COMPLETE CBC W/AUTO DIFF WBC: CPT | Performed by: INTERNAL MEDICINE

## 2021-05-26 RX ORDER — POTASSIUM CHLORIDE 750 MG/1
40 TABLET, FILM COATED, EXTENDED RELEASE ORAL ONCE
Status: COMPLETED | OUTPATIENT
Start: 2021-05-26 | End: 2021-05-26

## 2021-05-26 RX ADMIN — SUCRALFATE 1 G: 1 TABLET ORAL at 18:24

## 2021-05-26 RX ADMIN — SUCRALFATE 1 G: 1 TABLET ORAL at 08:50

## 2021-05-26 RX ADMIN — SODIUM CHLORIDE 125 ML/HR: 9 INJECTION, SOLUTION INTRAVENOUS at 14:10

## 2021-05-26 RX ADMIN — INSULIN LISPRO 4 UNITS: 100 INJECTION, SOLUTION INTRAVENOUS; SUBCUTANEOUS at 08:50

## 2021-05-26 RX ADMIN — POTASSIUM CHLORIDE 40 MEQ: 750 TABLET, EXTENDED RELEASE ORAL at 18:24

## 2021-05-26 RX ADMIN — METOCLOPRAMIDE HYDROCHLORIDE 10 MG: 5 INJECTION INTRAMUSCULAR; INTRAVENOUS at 18:25

## 2021-05-26 RX ADMIN — INSULIN LISPRO 4 UNITS: 100 INJECTION, SOLUTION INTRAVENOUS; SUBCUTANEOUS at 12:23

## 2021-05-26 RX ADMIN — INSULIN LISPRO 4 UNITS: 100 INJECTION, SOLUTION INTRAVENOUS; SUBCUTANEOUS at 18:25

## 2021-05-26 RX ADMIN — DULOXETINE HYDROCHLORIDE 30 MG: 30 CAPSULE, DELAYED RELEASE ORAL at 08:50

## 2021-05-26 RX ADMIN — SODIUM CHLORIDE 125 ML/HR: 9 INJECTION, SOLUTION INTRAVENOUS at 23:23

## 2021-05-26 RX ADMIN — ONDANSETRON 4 MG: 2 INJECTION INTRAMUSCULAR; INTRAVENOUS at 01:10

## 2021-05-26 RX ADMIN — METOCLOPRAMIDE HYDROCHLORIDE 10 MG: 5 INJECTION INTRAMUSCULAR; INTRAVENOUS at 05:55

## 2021-05-26 RX ADMIN — SUCRALFATE 1 G: 1 TABLET ORAL at 21:40

## 2021-05-26 RX ADMIN — INSULIN LISPRO 3 UNITS: 100 INJECTION, SOLUTION INTRAVENOUS; SUBCUTANEOUS at 21:40

## 2021-05-26 RX ADMIN — INSULIN LISPRO 3 UNITS: 100 INJECTION, SOLUTION INTRAVENOUS; SUBCUTANEOUS at 18:25

## 2021-05-26 RX ADMIN — ONDANSETRON 4 MG: 2 INJECTION INTRAMUSCULAR; INTRAVENOUS at 05:55

## 2021-05-26 RX ADMIN — METOCLOPRAMIDE HYDROCHLORIDE 10 MG: 5 INJECTION INTRAMUSCULAR; INTRAVENOUS at 12:22

## 2021-05-26 RX ADMIN — PANTOPRAZOLE SODIUM 40 MG: 40 INJECTION, POWDER, FOR SOLUTION INTRAVENOUS at 18:25

## 2021-05-26 RX ADMIN — PANTOPRAZOLE SODIUM 40 MG: 40 INJECTION, POWDER, FOR SOLUTION INTRAVENOUS at 08:50

## 2021-05-26 RX ADMIN — METOCLOPRAMIDE HYDROCHLORIDE 10 MG: 5 INJECTION INTRAMUSCULAR; INTRAVENOUS at 00:25

## 2021-05-26 RX ADMIN — PROMETHAZINE HYDROCHLORIDE 12.5 MG: 25 INJECTION INTRAMUSCULAR; INTRAVENOUS at 15:07

## 2021-05-26 RX ADMIN — INSULIN GLARGINE 20 UNITS: 100 INJECTION, SOLUTION SUBCUTANEOUS at 21:39

## 2021-05-26 RX ADMIN — ONDANSETRON 4 MG: 2 INJECTION INTRAMUSCULAR; INTRAVENOUS at 08:50

## 2021-05-27 LAB
ANION GAP SERPL CALCULATED.3IONS-SCNC: 8 MMOL/L (ref 5–15)
BASOPHILS # BLD AUTO: 0.03 10*3/MM3 (ref 0–0.2)
BASOPHILS NFR BLD AUTO: 0.4 % (ref 0–1.5)
BUN SERPL-MCNC: 7 MG/DL (ref 6–20)
BUN/CREAT SERPL: 10.1 (ref 7–25)
CALCIUM SPEC-SCNC: 8.2 MG/DL (ref 8.6–10.5)
CHLORIDE SERPL-SCNC: 104 MMOL/L (ref 98–107)
CO2 SERPL-SCNC: 28 MMOL/L (ref 22–29)
CREAT SERPL-MCNC: 0.69 MG/DL (ref 0.76–1.27)
DEPRECATED RDW RBC AUTO: 40.8 FL (ref 37–54)
EOSINOPHIL # BLD AUTO: 0.03 10*3/MM3 (ref 0–0.4)
EOSINOPHIL NFR BLD AUTO: 0.4 % (ref 0.3–6.2)
ERYTHROCYTE [DISTWIDTH] IN BLOOD BY AUTOMATED COUNT: 12.8 % (ref 12.3–15.4)
GFR SERPL CREATININE-BSD FRML MDRD: 135 ML/MIN/1.73
GLUCOSE BLDC GLUCOMTR-MCNC: 126 MG/DL (ref 70–130)
GLUCOSE BLDC GLUCOMTR-MCNC: 148 MG/DL (ref 70–130)
GLUCOSE BLDC GLUCOMTR-MCNC: 173 MG/DL (ref 70–130)
GLUCOSE BLDC GLUCOMTR-MCNC: 182 MG/DL (ref 70–130)
GLUCOSE BLDC GLUCOMTR-MCNC: 221 MG/DL (ref 70–130)
GLUCOSE SERPL-MCNC: 185 MG/DL (ref 65–99)
HCT VFR BLD AUTO: 34.6 % (ref 37.5–51)
HGB BLD-MCNC: 12 G/DL (ref 13–17.7)
IMM GRANULOCYTES # BLD AUTO: 0.03 10*3/MM3 (ref 0–0.05)
IMM GRANULOCYTES NFR BLD AUTO: 0.4 % (ref 0–0.5)
LYMPHOCYTES # BLD AUTO: 2.49 10*3/MM3 (ref 0.7–3.1)
LYMPHOCYTES NFR BLD AUTO: 32 % (ref 19.6–45.3)
MAGNESIUM SERPL-MCNC: 1.9 MG/DL (ref 1.6–2.6)
MCH RBC QN AUTO: 30.7 PG (ref 26.6–33)
MCHC RBC AUTO-ENTMCNC: 34.7 G/DL (ref 31.5–35.7)
MCV RBC AUTO: 88.5 FL (ref 79–97)
MONOCYTES # BLD AUTO: 0.86 10*3/MM3 (ref 0.1–0.9)
MONOCYTES NFR BLD AUTO: 11.1 % (ref 5–12)
NEUTROPHILS NFR BLD AUTO: 4.34 10*3/MM3 (ref 1.7–7)
NEUTROPHILS NFR BLD AUTO: 55.7 % (ref 42.7–76)
NRBC BLD AUTO-RTO: 0 /100 WBC (ref 0–0.2)
PLATELET # BLD AUTO: 281 10*3/MM3 (ref 140–450)
PMV BLD AUTO: 10 FL (ref 6–12)
POTASSIUM SERPL-SCNC: 3.2 MMOL/L (ref 3.5–5.2)
POTASSIUM SERPL-SCNC: 4.1 MMOL/L (ref 3.5–5.2)
RBC # BLD AUTO: 3.91 10*6/MM3 (ref 4.14–5.8)
SODIUM SERPL-SCNC: 140 MMOL/L (ref 136–145)
WBC # BLD AUTO: 7.78 10*3/MM3 (ref 3.4–10.8)

## 2021-05-27 PROCEDURE — 25010000002 METOCLOPRAMIDE PER 10 MG: Performed by: INTERNAL MEDICINE

## 2021-05-27 PROCEDURE — 82962 GLUCOSE BLOOD TEST: CPT

## 2021-05-27 PROCEDURE — 84132 ASSAY OF SERUM POTASSIUM: CPT | Performed by: HOSPITALIST

## 2021-05-27 PROCEDURE — 63710000001 INSULIN LISPRO (HUMAN) PER 5 UNITS: Performed by: INTERNAL MEDICINE

## 2021-05-27 PROCEDURE — 80048 BASIC METABOLIC PNL TOTAL CA: CPT | Performed by: HOSPITALIST

## 2021-05-27 PROCEDURE — 85025 COMPLETE CBC W/AUTO DIFF WBC: CPT | Performed by: HOSPITALIST

## 2021-05-27 PROCEDURE — 83735 ASSAY OF MAGNESIUM: CPT | Performed by: HOSPITALIST

## 2021-05-27 PROCEDURE — 63710000001 INSULIN GLARGINE PER 5 UNITS: Performed by: HOSPITALIST

## 2021-05-27 PROCEDURE — 99232 SBSQ HOSP IP/OBS MODERATE 35: CPT | Performed by: INTERNAL MEDICINE

## 2021-05-27 RX ORDER — POTASSIUM CHLORIDE 750 MG/1
40 TABLET, FILM COATED, EXTENDED RELEASE ORAL AS NEEDED
Status: DISCONTINUED | OUTPATIENT
Start: 2021-05-27 | End: 2021-05-28 | Stop reason: HOSPADM

## 2021-05-27 RX ORDER — POTASSIUM CHLORIDE 1.5 G/1.77G
40 POWDER, FOR SOLUTION ORAL AS NEEDED
Status: DISCONTINUED | OUTPATIENT
Start: 2021-05-27 | End: 2021-05-28 | Stop reason: HOSPADM

## 2021-05-27 RX ADMIN — SODIUM CHLORIDE 125 ML/HR: 9 INJECTION, SOLUTION INTRAVENOUS at 07:05

## 2021-05-27 RX ADMIN — SUCRALFATE 1 G: 1 TABLET ORAL at 06:45

## 2021-05-27 RX ADMIN — METOCLOPRAMIDE HYDROCHLORIDE 10 MG: 5 INJECTION INTRAMUSCULAR; INTRAVENOUS at 12:24

## 2021-05-27 RX ADMIN — INSULIN LISPRO 4 UNITS: 100 INJECTION, SOLUTION INTRAVENOUS; SUBCUTANEOUS at 17:30

## 2021-05-27 RX ADMIN — DULOXETINE HYDROCHLORIDE 30 MG: 30 CAPSULE, DELAYED RELEASE ORAL at 08:47

## 2021-05-27 RX ADMIN — INSULIN LISPRO 4 UNITS: 100 INJECTION, SOLUTION INTRAVENOUS; SUBCUTANEOUS at 12:26

## 2021-05-27 RX ADMIN — INSULIN LISPRO 4 UNITS: 100 INJECTION, SOLUTION INTRAVENOUS; SUBCUTANEOUS at 08:41

## 2021-05-27 RX ADMIN — METOCLOPRAMIDE HYDROCHLORIDE 10 MG: 5 INJECTION INTRAMUSCULAR; INTRAVENOUS at 23:55

## 2021-05-27 RX ADMIN — PANTOPRAZOLE SODIUM 40 MG: 40 INJECTION, POWDER, FOR SOLUTION INTRAVENOUS at 06:46

## 2021-05-27 RX ADMIN — INSULIN LISPRO 2 UNITS: 100 INJECTION, SOLUTION INTRAVENOUS; SUBCUTANEOUS at 08:40

## 2021-05-27 RX ADMIN — SUCRALFATE 1 G: 1 TABLET ORAL at 21:17

## 2021-05-27 RX ADMIN — SUCRALFATE 1 G: 1 TABLET ORAL at 12:24

## 2021-05-27 RX ADMIN — SUCRALFATE 1 G: 1 TABLET ORAL at 17:30

## 2021-05-27 RX ADMIN — INSULIN LISPRO 3 UNITS: 100 INJECTION, SOLUTION INTRAVENOUS; SUBCUTANEOUS at 12:24

## 2021-05-27 RX ADMIN — METOCLOPRAMIDE HYDROCHLORIDE 10 MG: 5 INJECTION INTRAMUSCULAR; INTRAVENOUS at 00:25

## 2021-05-27 RX ADMIN — POTASSIUM CHLORIDE 40 MEQ: 1.5 POWDER, FOR SOLUTION ORAL at 12:24

## 2021-05-27 RX ADMIN — PANTOPRAZOLE SODIUM 40 MG: 40 INJECTION, POWDER, FOR SOLUTION INTRAVENOUS at 17:30

## 2021-05-27 RX ADMIN — INSULIN GLARGINE 20 UNITS: 100 INJECTION, SOLUTION SUBCUTANEOUS at 21:17

## 2021-05-27 RX ADMIN — METOCLOPRAMIDE HYDROCHLORIDE 10 MG: 5 INJECTION INTRAMUSCULAR; INTRAVENOUS at 17:30

## 2021-05-27 RX ADMIN — METOCLOPRAMIDE HYDROCHLORIDE 10 MG: 5 INJECTION INTRAMUSCULAR; INTRAVENOUS at 06:45

## 2021-05-27 RX ADMIN — POTASSIUM CHLORIDE 40 MEQ: 1.5 POWDER, FOR SOLUTION ORAL at 08:40

## 2021-05-27 RX ADMIN — SODIUM CHLORIDE 125 ML/HR: 9 INJECTION, SOLUTION INTRAVENOUS at 23:47

## 2021-05-28 ENCOUNTER — READMISSION MANAGEMENT (OUTPATIENT)
Dept: CALL CENTER | Facility: HOSPITAL | Age: 31
End: 2021-05-28

## 2021-05-28 VITALS
RESPIRATION RATE: 18 BRPM | SYSTOLIC BLOOD PRESSURE: 160 MMHG | WEIGHT: 130.5 LBS | HEART RATE: 100 BPM | HEIGHT: 72 IN | BODY MASS INDEX: 17.67 KG/M2 | TEMPERATURE: 98.2 F | OXYGEN SATURATION: 94 % | DIASTOLIC BLOOD PRESSURE: 103 MMHG

## 2021-05-28 LAB
ANION GAP SERPL CALCULATED.3IONS-SCNC: 11.2 MMOL/L (ref 5–15)
BACTERIA SPEC AEROBE CULT: NORMAL
BACTERIA SPEC AEROBE CULT: NORMAL
BASOPHILS # BLD AUTO: 0.04 10*3/MM3 (ref 0–0.2)
BASOPHILS NFR BLD AUTO: 0.5 % (ref 0–1.5)
BUN SERPL-MCNC: 5 MG/DL (ref 6–20)
BUN/CREAT SERPL: 7.6 (ref 7–25)
CALCIUM SPEC-SCNC: 9 MG/DL (ref 8.6–10.5)
CHLORIDE SERPL-SCNC: 106 MMOL/L (ref 98–107)
CO2 SERPL-SCNC: 24.8 MMOL/L (ref 22–29)
CREAT SERPL-MCNC: 0.66 MG/DL (ref 0.76–1.27)
DEPRECATED RDW RBC AUTO: 42.9 FL (ref 37–54)
EOSINOPHIL # BLD AUTO: 0.08 10*3/MM3 (ref 0–0.4)
EOSINOPHIL NFR BLD AUTO: 0.9 % (ref 0.3–6.2)
ERYTHROCYTE [DISTWIDTH] IN BLOOD BY AUTOMATED COUNT: 13.3 % (ref 12.3–15.4)
GFR SERPL CREATININE-BSD FRML MDRD: 142 ML/MIN/1.73
GLUCOSE BLDC GLUCOMTR-MCNC: 242 MG/DL (ref 70–130)
GLUCOSE BLDC GLUCOMTR-MCNC: 68 MG/DL (ref 70–130)
GLUCOSE BLDC GLUCOMTR-MCNC: 74 MG/DL (ref 70–130)
GLUCOSE SERPL-MCNC: 65 MG/DL (ref 65–99)
HCT VFR BLD AUTO: 37.1 % (ref 37.5–51)
HGB BLD-MCNC: 12.6 G/DL (ref 13–17.7)
IMM GRANULOCYTES # BLD AUTO: 0.02 10*3/MM3 (ref 0–0.05)
IMM GRANULOCYTES NFR BLD AUTO: 0.2 % (ref 0–0.5)
LYMPHOCYTES # BLD AUTO: 3.54 10*3/MM3 (ref 0.7–3.1)
LYMPHOCYTES NFR BLD AUTO: 40.2 % (ref 19.6–45.3)
MCH RBC QN AUTO: 30 PG (ref 26.6–33)
MCHC RBC AUTO-ENTMCNC: 34 G/DL (ref 31.5–35.7)
MCV RBC AUTO: 88.3 FL (ref 79–97)
MONOCYTES # BLD AUTO: 0.97 10*3/MM3 (ref 0.1–0.9)
MONOCYTES NFR BLD AUTO: 11 % (ref 5–12)
NEUTROPHILS NFR BLD AUTO: 4.15 10*3/MM3 (ref 1.7–7)
NEUTROPHILS NFR BLD AUTO: 47.2 % (ref 42.7–76)
NRBC BLD AUTO-RTO: 0 /100 WBC (ref 0–0.2)
PLATELET # BLD AUTO: 334 10*3/MM3 (ref 140–450)
PMV BLD AUTO: 10.3 FL (ref 6–12)
POTASSIUM SERPL-SCNC: 3.2 MMOL/L (ref 3.5–5.2)
RBC # BLD AUTO: 4.2 10*6/MM3 (ref 4.14–5.8)
SODIUM SERPL-SCNC: 142 MMOL/L (ref 136–145)
WBC # BLD AUTO: 8.8 10*3/MM3 (ref 3.4–10.8)

## 2021-05-28 PROCEDURE — 80048 BASIC METABOLIC PNL TOTAL CA: CPT | Performed by: HOSPITALIST

## 2021-05-28 PROCEDURE — 99232 SBSQ HOSP IP/OBS MODERATE 35: CPT | Performed by: INTERNAL MEDICINE

## 2021-05-28 PROCEDURE — 63710000001 INSULIN LISPRO (HUMAN) PER 5 UNITS: Performed by: INTERNAL MEDICINE

## 2021-05-28 PROCEDURE — 82962 GLUCOSE BLOOD TEST: CPT

## 2021-05-28 PROCEDURE — 25010000002 METOCLOPRAMIDE PER 10 MG: Performed by: INTERNAL MEDICINE

## 2021-05-28 PROCEDURE — 85025 COMPLETE CBC W/AUTO DIFF WBC: CPT | Performed by: HOSPITALIST

## 2021-05-28 RX ORDER — PANTOPRAZOLE SODIUM 40 MG/1
40 TABLET, DELAYED RELEASE ORAL 2 TIMES DAILY
Status: DISCONTINUED | OUTPATIENT
Start: 2021-05-28 | End: 2021-05-28 | Stop reason: HOSPADM

## 2021-05-28 RX ADMIN — DULOXETINE HYDROCHLORIDE 30 MG: 30 CAPSULE, DELAYED RELEASE ORAL at 08:13

## 2021-05-28 RX ADMIN — INSULIN LISPRO 3 UNITS: 100 INJECTION, SOLUTION INTRAVENOUS; SUBCUTANEOUS at 08:13

## 2021-05-28 RX ADMIN — SUCRALFATE 1 G: 1 TABLET ORAL at 11:55

## 2021-05-28 RX ADMIN — METOCLOPRAMIDE HYDROCHLORIDE 10 MG: 5 INJECTION INTRAMUSCULAR; INTRAVENOUS at 06:42

## 2021-05-28 RX ADMIN — METOCLOPRAMIDE HYDROCHLORIDE 10 MG: 5 INJECTION INTRAMUSCULAR; INTRAVENOUS at 11:55

## 2021-05-28 RX ADMIN — PANTOPRAZOLE SODIUM 40 MG: 40 INJECTION, POWDER, FOR SOLUTION INTRAVENOUS at 06:43

## 2021-05-28 RX ADMIN — INSULIN LISPRO 4 UNITS: 100 INJECTION, SOLUTION INTRAVENOUS; SUBCUTANEOUS at 11:35

## 2021-05-28 RX ADMIN — SODIUM CHLORIDE 125 ML/HR: 9 INJECTION, SOLUTION INTRAVENOUS at 07:50

## 2021-05-28 RX ADMIN — INSULIN LISPRO 4 UNITS: 100 INJECTION, SOLUTION INTRAVENOUS; SUBCUTANEOUS at 08:14

## 2021-05-28 RX ADMIN — SUCRALFATE 1 G: 1 TABLET ORAL at 06:43

## 2021-05-29 NOTE — OUTREACH NOTE
Prep Survey      Responses   [facility] facility patient discharged from?  [place]   Is LACE score < 7 ?  No   Emergency Room discharge w/ pulse ox?  No   Eligibility  [facility]   Date of Admission  05/23/21   Date of Discharge  05/28/21   Discharge Disposition  Home or Self Care   Discharge diagnosis  Non-intractable N/V, T1DM - new insulin pump   Does the patient have one of the following disease processes/diagnoses(primary or secondary)?  Other   Does the patient have Home health ordered?  No   Is there a DME ordered?  No   Prep survey completed?  Yes          Gabriela Herring RN

## 2021-06-01 ENCOUNTER — TELEPHONE (OUTPATIENT)
Dept: FAMILY MEDICINE CLINIC | Facility: CLINIC | Age: 31
End: 2021-06-01

## 2021-06-01 ENCOUNTER — TRANSITIONAL CARE MANAGEMENT TELEPHONE ENCOUNTER (OUTPATIENT)
Dept: CALL CENTER | Facility: HOSPITAL | Age: 31
End: 2021-06-01

## 2021-06-01 NOTE — OUTREACH NOTE
Call Center TCM Note      Responses   [facility] patient discharged from?  [place]   Does the patient have one of the following disease processes/diagnoses(primary or secondary)?  Other   TCM attempt successful?  Yes   Call start time  1527   Call end time  1529   Discharge diagnosis  Non-intractable N/V, T1DM - new insulin pump   Meds reviewed with patient/caregiver?  Yes   Is the patient having any side effects they believe may be caused by any medication additions or changes?  No   Does the patient have all medications ordered at discharge?  N/A   Is the patient taking all medications as directed (includes completed medication regime)?  Yes   Comments regarding appointments  Office visit with PCP Gale Banegas, [place] 6/7/21 @ 4:30pm.   Does the patient have a primary care provider?   Yes   Does the patient have an appointment with their PCP within 7 days of discharge?  Greater than 7 days   Nursing Interventions  Verified appointment date/time/provider   Has the patient kept scheduled appointments due by today?  N/A   Has home health visited the patient within 72 hours of discharge?  N/A   Psychosocial issues?  No   Did the patient receive a copy of their discharge instructions?  Yes   Nursing interventions  Reviewed instructions with patient   What is the patient's perception of their health status since discharge?  Improving   Is the patient/caregiver able to teach back signs and symptoms related to disease process for when to call PCP?  Yes   Is the patient/caregiver able to teach back signs and symptoms related to disease process for when to call 911?  Yes   Is the patient/caregiver able to teach back the hierarchy of who to call/visit for symptoms/problems? PCP, Specialist, Home health nurse, Urgent Care, ED, 911  Yes   If the patient is a current smoker, are they able to teach back resources for cessation?  Smoking cessation medications, [phone]   TCM call completed?  Yes          Sofia  CHINO Ramírez    6/1/2021, 15:31 EDT

## 2021-06-01 NOTE — OUTREACH NOTE
Call Center TCM Note      Responses   [facility] [place] patient discharged from?  [place]   Does the patient have one of the following disease processes/diagnoses(primary or secondary)?  Other   TCM attempt successful?  No   Unsuccessful attempts  Attempt 1          Sofia Ramírez RN    6/1/2021, 12:55 EDT

## 2021-06-02 ENCOUNTER — TELEPHONE (OUTPATIENT)
Dept: GASTROENTEROLOGY | Facility: CLINIC | Age: 31
End: 2021-06-02

## 2021-06-02 NOTE — TELEPHONE ENCOUNTER
I have tentatively scheduled him for July 8 at noon-please see if this works for him.  Please also let him know that I put in a referral to the [facility] motility department.  If he has not heard anything from them soon, we may also refer to [facility].  Continue Reglan until he sees me in clinic

## 2021-06-02 NOTE — TELEPHONE ENCOUNTER
Per conversation with Lyla with GI motility clinic once referral and records are received they go thru physician review process and should receive a call to schedule in approximately 4 weeks--records and referral faxed [phone]

## 2021-06-02 NOTE — TELEPHONE ENCOUNTER
----- Message from Lena Aguayo MD sent at 5/26/2021 12:43 PM EDT -----  Regarding: UL referral  Can you please start the referral process to the [facility] motility clinic for consideration of gastric stimulator?  Diagnosis diabetic gastroparesis

## 2021-06-07 ENCOUNTER — OFFICE VISIT (OUTPATIENT)
Dept: FAMILY MEDICINE CLINIC | Facility: CLINIC | Age: 31
End: 2021-06-07

## 2021-06-07 VITALS
WEIGHT: 133 LBS | HEIGHT: 72 IN | BODY MASS INDEX: 18.01 KG/M2 | HEART RATE: 97 BPM | DIASTOLIC BLOOD PRESSURE: 58 MMHG | OXYGEN SATURATION: 100 % | SYSTOLIC BLOOD PRESSURE: 98 MMHG

## 2021-06-07 DIAGNOSIS — Z09 HOSPITAL DISCHARGE FOLLOW-UP: ICD-10-CM

## 2021-06-07 DIAGNOSIS — F41.9 ANXIETY AND DEPRESSION: ICD-10-CM

## 2021-06-07 DIAGNOSIS — F32.A ANXIETY AND DEPRESSION: ICD-10-CM

## 2021-06-07 DIAGNOSIS — E10.65 TYPE 1 DIABETES MELLITUS WITH HYPERGLYCEMIA (HCC): Primary | ICD-10-CM

## 2021-06-07 DIAGNOSIS — R11.2 NON-INTRACTABLE VOMITING WITH NAUSEA: ICD-10-CM

## 2021-06-07 PROCEDURE — 99214 OFFICE O/P EST MOD 30 MIN: CPT | Performed by: NURSE PRACTITIONER

## 2021-06-07 RX ORDER — PROCHLORPERAZINE 25 MG/1
SUPPOSITORY RECTAL
COMMUNITY
Start: 2021-05-24

## 2021-06-07 RX ORDER — CHLORPHENIR/PHENYLEPH/ASPIRIN 2-7.8-325
1 TABLET, EFFERVESCENT ORAL AS NEEDED
Qty: 50 STRIP | Refills: 3 | Status: SHIPPED | OUTPATIENT
Start: 2021-06-07 | End: 2022-09-16

## 2021-06-07 RX ORDER — MULTIPLE VITAMINS W/ MINERALS TAB 9MG-400MCG
1 TAB ORAL DAILY
COMMUNITY

## 2021-06-07 RX ORDER — DULOXETIN HYDROCHLORIDE 30 MG/1
30 CAPSULE, DELAYED RELEASE ORAL DAILY
Qty: 90 CAPSULE | Refills: 3 | Status: SHIPPED | OUTPATIENT
Start: 2021-06-07 | End: 2022-08-29 | Stop reason: SDUPTHER

## 2021-06-07 NOTE — PROGRESS NOTES
Chief Complaint  Transitional Care Management and Vomiting (hospital follow up)    Subjective          Morro Blancas presents to [facility]  Mr. Blancas presents today to follow up on a recent hospital admission for vomiting. He reports he was discharged home on 5/28/21 and has been doing ok since being home. He does not have an appetite and sometimes has to make himself eat. He is eating a soft diet and is only eating once a day. He states he has an appointment next month with both his gastrointestinal provider and endocrinology. He states the concern is ' is the GI issues making his glucose elevated or is it the elevated glucose making causing his GI issues.'  He is also here today to follow up on his anxiety/depression. He reports that since starting the duloxetine his anxiety/depression is improving. He has actually made contact with friends to re-establish social relationships.     I have reviewed the patient's medical history in detail and updated the computerized patient record.    Current Outpatient Medications:   •  Continuous Blood Gluc Sensor (Dexcom G6 Sensor), , Disp: , Rfl:   •  DULoxetine (CYMBALTA) 30 MG capsule, Take 1 capsule by mouth Daily., Disp: 90 capsule, Rfl: 3  •  Glucagon, rDNA, (Glucagon Emergency) 1 MG kit, As Needed., Disp: , Rfl:   •  Insulin Disposable Pump (OmniPod Dash 5 Pack Pods) misc, 0.85 Units. Basal rate recently decreased 1.5 u/hr to 0.85 u/hr  1 unit per 12 carb Correction factor 1unit=45mg/dL Goal 150 , Disp: , Rfl:   •  metoclopramide (Reglan) 5 MG tablet, Take 1 tablet by mouth 4 (Four) Times a Day Before Meals & at Bedtime As Needed (nausea/vomiting/gatroparesis)., Disp: 120 tablet, Rfl: 3  •  multivitamin with minerals tablet tablet, Take 1 tablet by mouth Daily. Chewable gummie, Disp: , Rfl:   •  ondansetron ODT (ZOFRAN-ODT) 4 MG disintegrating tablet, Take 4 mg by mouth Every 8 (Eight) Hours As Needed., Disp: , Rfl:   •  pantoprazole  "(PROTONIX) 40 MG EC tablet, Take 1 tablet by mouth 2 (Two) Times a Day Before Meals for 180 days., Disp: 60 tablet, Rfl: 5  •  promethazine (PHENERGAN) 25 MG suppository, Insert 1 suppository into the rectum Every 6 (Six) Hours As Needed for Nausea or Vomiting., Disp: 20 suppository, Rfl: 0  •  rOPINIRole (REQUIP) 0.5 MG tablet, Take 0.5 mg by mouth Daily As Needed., Disp: , Rfl:   •  sucralfate (Carafate) 1 g tablet, Take 1 tablet by mouth 2 (two) times a day for 120 days., Disp: 60 tablet, Rfl: 3  •  Acetone, Urine, Test (Ketone Test) strip, 1 strip by In Vitro route As Needed (nausea, vomiting with elevated glucose levels)., Disp: 50 strip, Rfl: 3     Objective   Vital Signs:   BP 98/58 (BP Location: Right arm, Patient Position: Sitting, Cuff Size: Adult)   Pulse 97   Ht 182.9 cm (72\")   Wt 60.3 kg (133 lb)   SpO2 100%   BMI 18.04 kg/m²       Physical Exam  Vitals reviewed.   Cardiovascular:      Rate and Rhythm: Normal rate and regular rhythm.      Pulses: Normal pulses.      Heart sounds: Normal heart sounds.   Pulmonary:      Effort: Pulmonary effort is normal.      Breath sounds: Normal breath sounds.   Skin:     General: Skin is warm and dry.   Neurological:      Mental Status: He is alert and oriented to person, place, and time.   Psychiatric:      Comments: No acute distress        Result Review :{Labs  Result Review  Imaging  Med Tab  Media  Procedures :23}     Common labs    Common Labsle 5/26/21 5/26/21 5/27/21 5/27/21 5/27/21 5/28/21 5/28/21    0501 0501 [id number] 0453 [id number]   Glucose  120 (A)  185 (A)   65   BUN  9  7   5 (A)   Creatinine  0.69 (A)  0.69 (A)   0.66 (A)   eGFR Non African Am  135  135   142   Sodium  138  140   142   Potassium  3.4 (A)  3.2 (A) 4.1  3.2 (A)   Chloride  102  104   106   Calcium  8.1 (A)  8.2 (A)   9.0   WBC 10.03  7.78   8.80    Hemoglobin 11.8 (A)  12.0 (A)   12.6 (A)    Hematocrit 34.0 (A)  34.6 (A)   37.1 (A)    Platelets 265  281   334    (A) Abnormal " value            Data reviewed: Recent hospitalization notes 5/26/21          Assessment and Plan    Diagnoses and all orders for this visit:    1. Type 1 diabetes mellitus with hyperglycemia (CMS/[facility]) (Primary)  -     Acetone, Urine, Test (Ketone Test) strip; 1 strip by In Vitro route As Needed (nausea, vomiting with elevated glucose levels).  Dispense: 50 strip; Refill: 3    2. Anxiety and depression  -     DULoxetine (CYMBALTA) 30 MG capsule; Take 1 capsule by mouth Daily.  Dispense: 90 capsule; Refill: 3    3. Hospital discharge follow-up    4. Non-intractable vomiting with nausea    Mr. Blancas presents today for a TCM visit to follow up on a recent hospitalization for nausea and vomiting. We discussed the affects that vomiting can have on his diabetes including signs/symptoms of ketoacidosis. He reports he has a sliding scale insulin dosing to use if he is having ketones in his urine.  We reviewed when and how to use the sliding scale to prevent ketoacidosis when ketones are in his urine using ketone test strips. We discussed that if he has ketones in his urine and his glucose is elevated he also need to eat something with sugar such as a non-diet soft drink because his body is breaking down his muscle for a source of energy which is cause the ketosis and the elevated glucose. Adding another source of glucose per food will help to decrease the ketones. He states he understands and did not know what to do. He thought he should not eat because his glucose was elevated.   I have reviewed and reconciled his medications with him today.  He is to follow up with GI and endocrinology as scheduled.  He is scheduled to see me in July and I will follow up with him again at that time.     Follow Up   Return if symptoms worsen or fail to improve, for Next scheduled follow up.  Patient was given instructions and counseling regarding his condition or for health maintenance advice. Please see specific information pulled into  the AVS if appropriate.

## 2021-07-03 ENCOUNTER — HOSPITAL ENCOUNTER (EMERGENCY)
Facility: HOSPITAL | Age: 31
Discharge: ANOTHER HOSPITAL | End: 2021-07-04
Attending: EMERGENCY MEDICINE | Admitting: EMERGENCY MEDICINE

## 2021-07-03 DIAGNOSIS — R73.9 HYPERGLYCEMIA: ICD-10-CM

## 2021-07-03 DIAGNOSIS — E10.10 TYPE 1 DIABETES MELLITUS WITH KETOACIDOSIS WITHOUT COMA (HCC): ICD-10-CM

## 2021-07-03 DIAGNOSIS — E86.0 DEHYDRATION: Primary | ICD-10-CM

## 2021-07-03 LAB
ALBUMIN SERPL-MCNC: 5.1 G/DL (ref 3.5–5.2)
ALBUMIN/GLOB SERPL: 1.7 G/DL
ALP SERPL-CCNC: 83 U/L (ref 39–117)
ALT SERPL W P-5'-P-CCNC: 16 U/L (ref 1–41)
ANION GAP SERPL CALCULATED.3IONS-SCNC: 20.5 MMOL/L (ref 5–15)
ANION GAP SERPL CALCULATED.3IONS-SCNC: 22.1 MMOL/L (ref 5–15)
ARTERIAL PATENCY WRIST A: POSITIVE
AST SERPL-CCNC: 16 U/L (ref 1–40)
BACTERIA UR QL AUTO: ABNORMAL /HPF
BASE EXCESS BLDA CALC-SCNC: -7.2 MMOL/L (ref -2–2)
BASOPHILS # BLD AUTO: 0.04 10*3/MM3 (ref 0–0.2)
BASOPHILS NFR BLD AUTO: 0.3 % (ref 0–1.5)
BDY SITE: ABNORMAL
BILIRUB SERPL-MCNC: 1 MG/DL (ref 0–1.2)
BILIRUB UR QL STRIP: NEGATIVE
BUN SERPL-MCNC: 29 MG/DL (ref 6–20)
BUN SERPL-MCNC: 29 MG/DL (ref 6–20)
BUN/CREAT SERPL: 29 (ref 7–25)
BUN/CREAT SERPL: 30.2 (ref 7–25)
CALCIUM SPEC-SCNC: 10.3 MG/DL (ref 8.6–10.5)
CALCIUM SPEC-SCNC: 8.6 MG/DL (ref 8.6–10.5)
CHLORIDE SERPL-SCNC: 100 MMOL/L (ref 98–107)
CHLORIDE SERPL-SCNC: 97 MMOL/L (ref 98–107)
CLARITY UR: CLEAR
CO2 SERPL-SCNC: 17.5 MMOL/L (ref 22–29)
CO2 SERPL-SCNC: 17.9 MMOL/L (ref 22–29)
COHGB MFR BLD: 1 % (ref 0–1.5)
COLOR UR: YELLOW
CREAT SERPL-MCNC: 0.96 MG/DL (ref 0.76–1.27)
CREAT SERPL-MCNC: 1 MG/DL (ref 0.76–1.27)
DEPRECATED RDW RBC AUTO: 37.8 FL (ref 37–54)
EOSINOPHIL # BLD AUTO: 0 10*3/MM3 (ref 0–0.4)
EOSINOPHIL NFR BLD AUTO: 0 % (ref 0.3–6.2)
ERYTHROCYTE [DISTWIDTH] IN BLOOD BY AUTOMATED COUNT: 12.3 % (ref 12.3–15.4)
FHHB: 3.4 % (ref 0–5)
GAS FLOW AIRWAY: 0 LPM
GFR SERPL CREATININE-BSD FRML MDRD: 88 ML/MIN/1.73
GFR SERPL CREATININE-BSD FRML MDRD: 92 ML/MIN/1.73
GLOBULIN UR ELPH-MCNC: 3 GM/DL
GLUCOSE BLDC GLUCOMTR-MCNC: 209 MG/DL (ref 70–130)
GLUCOSE BLDC GLUCOMTR-MCNC: 276 MG/DL (ref 70–130)
GLUCOSE SERPL-MCNC: 225 MG/DL (ref 65–99)
GLUCOSE SERPL-MCNC: 296 MG/DL (ref 65–99)
GLUCOSE UR STRIP-MCNC: ABNORMAL MG/DL
HCO3 BLDA-SCNC: 18.1 MMOL/L (ref 22–26)
HCT VFR BLD AUTO: 42.2 % (ref 37.5–51)
HGB BLD-MCNC: 15 G/DL (ref 13–17.7)
HGB BLDA-MCNC: 12.9 G/DL (ref 13.8–16.4)
HGB UR QL STRIP.AUTO: ABNORMAL
HOLD SPECIMEN: NORMAL
HOLD SPECIMEN: NORMAL
HYALINE CASTS UR QL AUTO: ABNORMAL /LPF
IMM GRANULOCYTES # BLD AUTO: 0.04 10*3/MM3 (ref 0–0.05)
IMM GRANULOCYTES NFR BLD AUTO: 0.3 % (ref 0–0.5)
INHALED O2 CONCENTRATION: ABNORMAL %
KETONES UR QL STRIP: ABNORMAL
LACTATE BLDA-SCNC: ABNORMAL MMOL/L
LEUKOCYTE ESTERASE UR QL STRIP.AUTO: NEGATIVE
LIPASE SERPL-CCNC: 16 U/L (ref 13–60)
LYMPHOCYTES # BLD AUTO: 1.47 10*3/MM3 (ref 0.7–3.1)
LYMPHOCYTES NFR BLD AUTO: 9.8 % (ref 19.6–45.3)
MCH RBC QN AUTO: 30.3 PG (ref 26.6–33)
MCHC RBC AUTO-ENTMCNC: 35.5 G/DL (ref 31.5–35.7)
MCV RBC AUTO: 85.3 FL (ref 79–97)
METHGB BLD QL: 0.1 % (ref 0–1.5)
MODALITY: ABNORMAL
MONOCYTES # BLD AUTO: 0.3 10*3/MM3 (ref 0.1–0.9)
MONOCYTES NFR BLD AUTO: 2 % (ref 5–12)
NEUTROPHILS NFR BLD AUTO: 13.08 10*3/MM3 (ref 1.7–7)
NEUTROPHILS NFR BLD AUTO: 87.6 % (ref 42.7–76)
NITRITE UR QL STRIP: NEGATIVE
NOTE: ABNORMAL
NRBC BLD AUTO-RTO: 0 /100 WBC (ref 0–0.2)
OXYHGB MFR BLDV: 95.5 % (ref 94–99)
PCO2 BLDA: 35.9 MM HG (ref 35–45)
PEEP RESPIRATORY: ABNORMAL CM[H2O]
PH BLDA: 7.32 PH UNITS (ref 7.35–7.45)
PH UR STRIP.AUTO: 5.5 [PH] (ref 5–8)
PLATELET # BLD AUTO: 355 10*3/MM3 (ref 140–450)
PMV BLD AUTO: 11 FL (ref 6–12)
PO2 BLD: ABNORMAL MM[HG]
PO2 BLDA: 95.1 MM HG (ref 80–100)
POTASSIUM SERPL-SCNC: 4.4 MMOL/L (ref 3.5–5.2)
POTASSIUM SERPL-SCNC: 4.4 MMOL/L (ref 3.5–5.2)
PROT SERPL-MCNC: 8.1 G/DL (ref 6–8.5)
PROT UR QL STRIP: ABNORMAL
RBC # BLD AUTO: 4.95 10*6/MM3 (ref 4.14–5.8)
RBC # UR: ABNORMAL /HPF
REF LAB TEST METHOD: ABNORMAL
SAO2 % BLDCOA: 96.6 % (ref 95–99)
SET MECH RESP RATE: ABNORMAL
SODIUM SERPL-SCNC: 137 MMOL/L (ref 136–145)
SODIUM SERPL-SCNC: 138 MMOL/L (ref 136–145)
SP GR UR STRIP: 1.03 (ref 1–1.03)
SQUAMOUS #/AREA URNS HPF: ABNORMAL /HPF
UROBILINOGEN UR QL STRIP: ABNORMAL
VENTILATOR MODE: ABNORMAL
WBC # BLD AUTO: 14.93 10*3/MM3 (ref 3.4–10.8)
WBC UR QL AUTO: ABNORMAL /HPF
WHOLE BLOOD HOLD SPECIMEN: NORMAL

## 2021-07-03 PROCEDURE — 36415 COLL VENOUS BLD VENIPUNCTURE: CPT

## 2021-07-03 PROCEDURE — 25010000002 ONDANSETRON PER 1 MG: Performed by: EMERGENCY MEDICINE

## 2021-07-03 PROCEDURE — 80053 COMPREHEN METABOLIC PANEL: CPT

## 2021-07-03 PROCEDURE — 82805 BLOOD GASES W/O2 SATURATION: CPT | Performed by: EMERGENCY MEDICINE

## 2021-07-03 PROCEDURE — 96361 HYDRATE IV INFUSION ADD-ON: CPT

## 2021-07-03 PROCEDURE — 25010000002 LORAZEPAM PER 2 MG: Performed by: EMERGENCY MEDICINE

## 2021-07-03 PROCEDURE — 83050 HGB METHEMOGLOBIN QUAN: CPT | Performed by: EMERGENCY MEDICINE

## 2021-07-03 PROCEDURE — 96374 THER/PROPH/DIAG INJ IV PUSH: CPT

## 2021-07-03 PROCEDURE — 25010000002 HALOPERIDOL LACTATE PER 5 MG: Performed by: EMERGENCY MEDICINE

## 2021-07-03 PROCEDURE — 36600 WITHDRAWAL OF ARTERIAL BLOOD: CPT | Performed by: EMERGENCY MEDICINE

## 2021-07-03 PROCEDURE — 85025 COMPLETE CBC W/AUTO DIFF WBC: CPT

## 2021-07-03 PROCEDURE — 81001 URINALYSIS AUTO W/SCOPE: CPT | Performed by: EMERGENCY MEDICINE

## 2021-07-03 PROCEDURE — 82375 ASSAY CARBOXYHB QUANT: CPT | Performed by: EMERGENCY MEDICINE

## 2021-07-03 PROCEDURE — 83690 ASSAY OF LIPASE: CPT

## 2021-07-03 PROCEDURE — 63710000001 INSULIN REGULAR HUMAN PER 5 UNITS: Performed by: EMERGENCY MEDICINE

## 2021-07-03 PROCEDURE — 96375 TX/PRO/DX INJ NEW DRUG ADDON: CPT

## 2021-07-03 PROCEDURE — 99283 EMERGENCY DEPT VISIT LOW MDM: CPT

## 2021-07-03 PROCEDURE — 82962 GLUCOSE BLOOD TEST: CPT

## 2021-07-03 RX ORDER — DEXTROSE, SODIUM CHLORIDE, AND POTASSIUM CHLORIDE 5; .45; .15 G/100ML; G/100ML; G/100ML
150 INJECTION INTRAVENOUS CONTINUOUS PRN
Status: DISCONTINUED | OUTPATIENT
Start: 2021-07-03 | End: 2021-07-04 | Stop reason: HOSPADM

## 2021-07-03 RX ORDER — DEXTROSE, SODIUM CHLORIDE, AND POTASSIUM CHLORIDE 5; .9; .15 G/100ML; G/100ML; G/100ML
150 INJECTION INTRAVENOUS CONTINUOUS PRN
Status: DISCONTINUED | OUTPATIENT
Start: 2021-07-03 | End: 2021-07-04 | Stop reason: HOSPADM

## 2021-07-03 RX ORDER — NICOTINE POLACRILEX 4 MG
15 LOZENGE BUCCAL
Status: DISCONTINUED | OUTPATIENT
Start: 2021-07-03 | End: 2021-07-04 | Stop reason: HOSPADM

## 2021-07-03 RX ORDER — POTASSIUM CHLORIDE 7.45 MG/ML
10 INJECTION INTRAVENOUS CONTINUOUS PRN
Status: DISCONTINUED | OUTPATIENT
Start: 2021-07-03 | End: 2021-07-04 | Stop reason: HOSPADM

## 2021-07-03 RX ORDER — LORAZEPAM 2 MG/ML
1 INJECTION INTRAMUSCULAR ONCE
Status: COMPLETED | OUTPATIENT
Start: 2021-07-03 | End: 2021-07-03

## 2021-07-03 RX ORDER — SODIUM CHLORIDE AND POTASSIUM CHLORIDE 150; 450 MG/100ML; MG/100ML
250 INJECTION, SOLUTION INTRAVENOUS CONTINUOUS PRN
Status: DISCONTINUED | OUTPATIENT
Start: 2021-07-03 | End: 2021-07-04 | Stop reason: HOSPADM

## 2021-07-03 RX ORDER — DEXTROSE MONOHYDRATE 25 G/50ML
10-50 INJECTION, SOLUTION INTRAVENOUS
Status: DISCONTINUED | OUTPATIENT
Start: 2021-07-03 | End: 2021-07-04 | Stop reason: HOSPADM

## 2021-07-03 RX ORDER — SODIUM CHLORIDE 450 MG/100ML
250 INJECTION, SOLUTION INTRAVENOUS CONTINUOUS PRN
Status: DISCONTINUED | OUTPATIENT
Start: 2021-07-03 | End: 2021-07-04 | Stop reason: HOSPADM

## 2021-07-03 RX ORDER — FAMOTIDINE 10 MG/ML
20 INJECTION, SOLUTION INTRAVENOUS ONCE
Status: COMPLETED | OUTPATIENT
Start: 2021-07-03 | End: 2021-07-03

## 2021-07-03 RX ORDER — ONDANSETRON 2 MG/ML
4 INJECTION INTRAMUSCULAR; INTRAVENOUS ONCE
Status: COMPLETED | OUTPATIENT
Start: 2021-07-03 | End: 2021-07-03

## 2021-07-03 RX ORDER — SODIUM CHLORIDE 0.9 % (FLUSH) 0.9 %
3 SYRINGE (ML) INJECTION EVERY 12 HOURS SCHEDULED
Status: DISCONTINUED | OUTPATIENT
Start: 2021-07-03 | End: 2021-07-04 | Stop reason: HOSPADM

## 2021-07-03 RX ORDER — HALOPERIDOL 5 MG/ML
2 INJECTION INTRAMUSCULAR ONCE
Status: COMPLETED | OUTPATIENT
Start: 2021-07-03 | End: 2021-07-03

## 2021-07-03 RX ORDER — DEXTROSE MONOHYDRATE 100 MG/ML
50-250 INJECTION, SOLUTION INTRAVENOUS
Status: DISCONTINUED | OUTPATIENT
Start: 2021-07-03 | End: 2021-07-04 | Stop reason: HOSPADM

## 2021-07-03 RX ORDER — SODIUM CHLORIDE 0.9 % (FLUSH) 0.9 %
10 SYRINGE (ML) INJECTION AS NEEDED
Status: DISCONTINUED | OUTPATIENT
Start: 2021-07-03 | End: 2021-07-04 | Stop reason: HOSPADM

## 2021-07-03 RX ORDER — SODIUM CHLORIDE AND POTASSIUM CHLORIDE 150; 900 MG/100ML; MG/100ML
250 INJECTION, SOLUTION INTRAVENOUS CONTINUOUS PRN
Status: DISCONTINUED | OUTPATIENT
Start: 2021-07-03 | End: 2021-07-04 | Stop reason: HOSPADM

## 2021-07-03 RX ORDER — DEXTROSE AND SODIUM CHLORIDE 5; .45 G/100ML; G/100ML
150 INJECTION, SOLUTION INTRAVENOUS CONTINUOUS PRN
Status: DISCONTINUED | OUTPATIENT
Start: 2021-07-03 | End: 2021-07-04 | Stop reason: HOSPADM

## 2021-07-03 RX ORDER — DEXTROSE AND SODIUM CHLORIDE 5; .9 G/100ML; G/100ML
150 INJECTION, SOLUTION INTRAVENOUS CONTINUOUS PRN
Status: DISCONTINUED | OUTPATIENT
Start: 2021-07-03 | End: 2021-07-04 | Stop reason: HOSPADM

## 2021-07-03 RX ORDER — SODIUM CHLORIDE 9 MG/ML
250 INJECTION, SOLUTION INTRAVENOUS CONTINUOUS PRN
Status: DISCONTINUED | OUTPATIENT
Start: 2021-07-03 | End: 2021-07-04 | Stop reason: HOSPADM

## 2021-07-03 RX ADMIN — SODIUM CHLORIDE, PRESERVATIVE FREE 10 ML: 5 INJECTION INTRAVENOUS at 19:10

## 2021-07-03 RX ADMIN — SODIUM CHLORIDE 1000 ML: 9 INJECTION, SOLUTION INTRAVENOUS at 21:20

## 2021-07-03 RX ADMIN — FAMOTIDINE 20 MG: 10 INJECTION INTRAVENOUS at 19:10

## 2021-07-03 RX ADMIN — HALOPERIDOL LACTATE 2 MG: 5 INJECTION, SOLUTION INTRAMUSCULAR at 19:10

## 2021-07-03 RX ADMIN — INSULIN HUMAN 7 UNITS: 100 INJECTION, SOLUTION PARENTERAL at 21:20

## 2021-07-03 RX ADMIN — SODIUM CHLORIDE 1000 ML: 9 INJECTION, SOLUTION INTRAVENOUS at 23:14

## 2021-07-03 RX ADMIN — SODIUM CHLORIDE 1000 ML: 9 INJECTION, SOLUTION INTRAVENOUS at 23:13

## 2021-07-03 RX ADMIN — ONDANSETRON 4 MG: 2 INJECTION INTRAMUSCULAR; INTRAVENOUS at 19:10

## 2021-07-03 RX ADMIN — LORAZEPAM 1 MG: 2 INJECTION INTRAMUSCULAR; INTRAVENOUS at 19:10

## 2021-07-03 RX ADMIN — SODIUM CHLORIDE, PRESERVATIVE FREE 3 ML: 5 INJECTION INTRAVENOUS at 23:16

## 2021-07-03 RX ADMIN — SODIUM CHLORIDE 1000 ML: 9 INJECTION, SOLUTION INTRAVENOUS at 19:15

## 2021-07-03 RX ADMIN — INSULIN HUMAN 3 UNITS/HR: 1 INJECTION, SOLUTION INTRAVENOUS at 23:15

## 2021-07-03 RX ADMIN — SODIUM CHLORIDE, PRESERVATIVE FREE 10 ML: 5 INJECTION INTRAVENOUS at 19:17

## 2021-07-03 NOTE — ED PROVIDER NOTES
Time: 20:41 EDT  Arrived by: private vehicle  Chief Complaint: abdominal pain/nausea  History provided by: patient and family  History is limited by: N/A    History of Present Illness:  Patient is a 30 y.o. year old male that presents to the emergency department with upper abdominal pain and nausea. Pt family reports that pt has had 5 previous ED visits for similar symptoms.       Nausea  The primary symptoms include abdominal pain and nausea. Primary symptoms do not include fever, vomiting, diarrhea, dysuria or myalgias. The illness began more than 7 days ago. The onset was gradual. The problem has not changed since onset.  The abdominal pain began more than 2 days ago. The abdominal pain has been unchanged since its onset. Pain location: upper. The abdominal pain does not radiate. The severity of the abdominal pain is 7/10. The abdominal pain is relieved by nothing.   Nausea began more than 1 week ago.   The illness does not include chills.           Similar Symptoms Previously: yes  Recently seen: yes      Patient Care Team  Primary Care Provider: Gale Banegas APRN      Past Medical History:     No Known Allergies  Past Medical History:   Diagnosis Date   • Anxiety    • Anxiety and depression    • Colon polyp 8/30/2017    1   • Depression    • Diabetes type 1, uncontrolled (CMS/HCC)     diagnosed at age 17   • Dyspepsia    • Family hx of colon cancer    • Gastroparesis    • GERD (gastroesophageal reflux disease)      Past Surgical History:   Procedure Laterality Date   • COLONOSCOPY N/A 8/30/2017    One 3 mm polyp in the sigmoid colon, Granularity in the terminal ileum   • ENDOSCOPY N/A 4/15/2021    Procedure: ESOPHAGOGASTRODUODENOSCOPY with biopsies;  Surgeon: Da Fleming MD;  Location: [facility] ENDOSCOPY;  Service: Gastroenterology;  Laterality: N/A;  PRE:   nausea, vomiting, hematemesis  POST:  reflux esophagitis, erosive gastropathy, scalloped mucosa in duodenum ? celiac     Family History   Problem  Relation Age of Onset   • Cancer Mother    • Hypertension Father    • Depression Paternal Uncle    • Hypertension Maternal Grandmother    • Irritable bowel syndrome Maternal Grandmother    • Colon cancer Maternal Grandmother    • Hypertension Maternal Grandfather    • Alcohol abuse Maternal Grandfather    • Hypertension Paternal Grandmother    • Cancer Paternal Grandmother    • COPD Paternal Grandmother    • Depression Paternal Grandmother    • Heart disease Paternal Grandfather    • Hypertension Paternal Grandfather        Home Medications:  Prior to Admission medications    Medication Sig Start Date End Date Taking? Authorizing Provider   Acetone, Urine, Test (Ketone Test) strip 1 strip by In Vitro route As Needed (nausea, vomiting with elevated glucose levels). 6/7/21   Gale Banegas APRN   Continuous Blood Gluc Sensor (Dexcom G6 Sensor)  5/24/21   Mynor Douglass MD   DULoxetine (CYMBALTA) 30 MG capsule Take 1 capsule by mouth Daily. 6/7/21   Gale Banegas APRN   Glucagon, rDNA, (Glucagon Emergency) 1 MG kit As Needed. 1/25/21   Mynor Douglass MD   Insulin Disposable Pump (OmniPod Dash 5 Pack Pods) misc 0.85 Units. Basal rate recently decreased 1.5 u/hr to 0.85 u/hr   1 unit per 12 carb  Correction factor 1unit=45mg/dL  Goal 150     Mynor Douglass MD   metoclopramide (Reglan) 5 MG tablet Take 1 tablet by mouth 4 (Four) Times a Day Before Meals & at Bedtime As Needed (nausea/vomiting/gatroparesis). 4/28/21   Ellyn Chaudhari APRN   multivitamin with minerals tablet tablet Take 1 tablet by mouth Daily. Chewable gummie    Mynor Douglass MD   ondansetron ODT (ZOFRAN-ODT) 4 MG disintegrating tablet Take 4 mg by mouth Every 8 (Eight) Hours As Needed. 4/25/21   Mynor Douglass MD   pantoprazole (PROTONIX) 40 MG EC tablet Take 1 tablet by mouth 2 (Two) Times a Day Before Meals for 180 days. 4/28/21 10/25/21  Ellyn Chaudhari APRN   promethazine (PHENERGAN) 25 MG  "suppository Insert 1 suppository into the rectum Every 6 (Six) Hours As Needed for Nausea or Vomiting. 4/14/21   Morro Kwan MD   rOPINIRole (REQUIP) 0.5 MG tablet Take 0.5 mg by mouth Daily As Needed. 4/25/21   Provider, MD Mynor   sucralfate (Carafate) 1 g tablet Take 1 tablet by mouth 2 (two) times a day for 120 days. 4/28/21 8/26/21  Watson, YUDELKA Yang        Social History:   PT  reports that he has been smoking cigarettes. He started smoking about 16 years ago. He has a 7.50 pack-year smoking history. He has never used smokeless tobacco. He reports current alcohol use. He reports current drug use. Frequency: 7.00 times per week. Drug: Marijuana.    Record Review:  I have reviewed the patient's records in "[employer].     Review of Systems  Review of Systems   Constitutional: Negative for chills and fever.   HENT: Negative for congestion, rhinorrhea and sore throat.    Eyes: Negative for pain and visual disturbance.   Respiratory: Negative for apnea, cough, chest tightness and shortness of breath.    Cardiovascular: Negative for chest pain and palpitations.   Gastrointestinal: Positive for abdominal pain and nausea. Negative for diarrhea and vomiting.   Genitourinary: Negative for difficulty urinating and dysuria.   Musculoskeletal: Negative for joint swelling and myalgias.   Skin: Negative for color change.   Neurological: Negative for seizures and headaches.   Psychiatric/Behavioral: Negative.    All other systems reviewed and are negative.       Physical Exam  /88   Pulse 99   Temp 97.8 °F (36.6 °C) (Oral)   Resp 20   Ht 175.3 cm (69\")   Wt 58.1 kg (128 lb 1.4 oz)   SpO2 98%   BMI 18.92 kg/m²     Physical Exam  Vitals and nursing note reviewed.   Constitutional:       General: He is not in acute distress.     Appearance: Normal appearance. He is not toxic-appearing.   HENT:      Head: Normocephalic and atraumatic.      Jaw: There is normal jaw occlusion.   Eyes:      General: Lids " "are normal.      Extraocular Movements: Extraocular movements intact.      Conjunctiva/sclera: Conjunctivae normal.      Pupils: Pupils are equal, round, and reactive to light.   Cardiovascular:      Rate and Rhythm: Normal rate and regular rhythm.      Pulses: Normal pulses.      Heart sounds: Normal heart sounds.   Pulmonary:      Effort: Pulmonary effort is normal. No respiratory distress.      Breath sounds: Normal breath sounds. No wheezing or rhonchi.   Abdominal:      General: Abdomen is flat. There is no distension.      Palpations: Abdomen is soft.      Tenderness: There is no abdominal tenderness. There is no guarding or rebound.   Musculoskeletal:         General: Normal range of motion.      Cervical back: Normal range of motion and neck supple.      Right lower leg: No edema.      Left lower leg: No edema.   Skin:     General: Skin is warm and dry.   Neurological:      Mental Status: He is alert and oriented to person, place, and time. Mental status is at baseline.   Psychiatric:         Mood and Affect: Mood normal.                  ED Course  /88   Pulse 99   Temp 97.8 °F (36.6 °C) (Oral)   Resp 20   Ht 175.3 cm (69\")   Wt 58.1 kg (128 lb 1.4 oz)   SpO2 98%   BMI 18.92 kg/m²   Results for orders placed or performed during the hospital encounter of 07/03/21   Comprehensive Metabolic Panel    Specimen: Blood   Result Value Ref Range    Glucose 225 (H) 65 - 99 mg/dL    BUN 29 (H) 6 - 20 mg/dL    Creatinine 1.00 0.76 - 1.27 mg/dL    Sodium 137 136 - 145 mmol/L    Potassium 4.4 3.5 - 5.2 mmol/L    Chloride 97 (L) 98 - 107 mmol/L    CO2 17.9 (L) 22.0 - 29.0 mmol/L    Calcium 10.3 8.6 - 10.5 mg/dL    Total Protein 8.1 6.0 - 8.5 g/dL    Albumin 5.10 3.50 - 5.20 g/dL    ALT (SGPT) 16 1 - 41 U/L    AST (SGOT) 16 1 - 40 U/L    Alkaline Phosphatase 83 39 - 117 U/L    Total Bilirubin 1.0 0.0 - 1.2 mg/dL    eGFR Non African Amer 88 >60 mL/min/1.73    Globulin 3.0 gm/dL    A/G Ratio 1.7 g/dL    " BUN/Creatinine Ratio 29.0 (H) 7.0 - 25.0    Anion Gap 22.1 (H) 5.0 - 15.0 mmol/L   Lipase    Specimen: Blood   Result Value Ref Range    Lipase 16 13 - 60 U/L   Urinalysis With Microscopic If Indicated (No Culture) - Urine, Clean Catch    Specimen: Urine, Clean Catch   Result Value Ref Range    Color, UA Yellow Yellow, Straw    Appearance, UA Clear Clear    pH, UA 5.5 5.0 - 8.0    Specific Gravity, UA 1.029 1.005 - 1.030    Glucose,  mg/dL (2+) (A) Negative    Ketones, UA >=160 mg/dL (4+) (A) Negative    Bilirubin, UA Negative Negative    Blood, UA Moderate (2+) (A) Negative    Protein,  mg/dL (2+) (A) Negative    Leuk Esterase, UA Negative Negative    Nitrite, UA Negative Negative    Urobilinogen, UA 1.0 E.U./dL 0.2 - 1.0 E.U./dL   CBC Auto Differential    Specimen: Blood   Result Value Ref Range    WBC 14.93 (H) 3.40 - 10.80 10*3/mm3    RBC 4.95 4.14 - 5.80 10*6/mm3    Hemoglobin 15.0 13.0 - 17.7 g/dL    Hematocrit 42.2 37.5 - 51.0 %    MCV 85.3 79.0 - 97.0 fL    MCH 30.3 26.6 - 33.0 pg    MCHC 35.5 31.5 - 35.7 g/dL    RDW 12.3 12.3 - 15.4 %    RDW-SD 37.8 37.0 - 54.0 fl    MPV 11.0 6.0 - 12.0 fL    Platelets 355 140 - 450 10*3/mm3    Neutrophil % 87.6 (H) 42.7 - 76.0 %    Lymphocyte % 9.8 (L) 19.6 - 45.3 %    Monocyte % 2.0 (L) 5.0 - 12.0 %    Eosinophil % 0.0 (L) 0.3 - 6.2 %    Basophil % 0.3 0.0 - 1.5 %    Immature Grans % 0.3 0.0 - 0.5 %    Neutrophils, Absolute 13.08 (H) 1.70 - 7.00 10*3/mm3    Lymphocytes, Absolute 1.47 0.70 - 3.10 10*3/mm3    Monocytes, Absolute 0.30 0.10 - 0.90 10*3/mm3    Eosinophils, Absolute 0.00 0.00 - 0.40 10*3/mm3    Basophils, Absolute 0.04 0.00 - 0.20 10*3/mm3    Immature Grans, Absolute 0.04 0.00 - 0.05 10*3/mm3    nRBC 0.0 0.0 - 0.2 /100 WBC   Urinalysis, Microscopic Only - Urine, Clean Catch    Specimen: Urine, Clean Catch   Result Value Ref Range    RBC, UA 6-12 (A) None Seen /HPF    WBC, UA 0-2 (A) None Seen /HPF    Bacteria, UA None Seen None Seen /HPF    Squamous  Epithelial Cells, UA 0-2 None Seen, 0-2 /HPF    Hyaline Casts, UA 3-6 None Seen /LPF    Methodology Automated Microscopy    Green Top (Gel)   Result Value Ref Range    Extra Tube Hold for add-ons.    Lavender Top   Result Value Ref Range    Extra Tube hold for add-on    Gold Top - SST   Result Value Ref Range    Extra Tube Hold for add-ons.      Medications   sodium chloride 0.9 % flush 10 mL (10 mL Intravenous Given 7/3/21 1917)   insulin regular (humuLIN R,novoLIN R) injection 7 Units (has no administration in time range)   famotidine (PEPCID) injection 20 mg (20 mg Intravenous Given 7/3/21 1910)   ondansetron (ZOFRAN) injection 4 mg (4 mg Intravenous Given 7/3/21 1910)   haloperidol lactate (HALDOL) injection 2 mg (2 mg Intravenous Given 7/3/21 1910)   LORazepam (ATIVAN) injection 1 mg (1 mg Intravenous Given 7/3/21 1910)   sodium chloride 0.9 % bolus 1,000 mL (1,000 mL Intravenous New Bag 7/3/21 1915)     No results found.        Medical Decision Making:                     MDM  Number of Diagnoses or Management Options  Dehydration  Diagnosis management comments: The patient´s CBC was reviewed and shows no abnormalities of critical concern. Of note, there is no anemia requiring a blood transfusion and the platelet count is acceptable.  CMP shows a blood glucoseOf 225, anion gap of 22, and a bicarb of 17.  Patient was given insulin and fluids in the emergency department.  Patient states he is still unable to tolerate p.o.  Patient does have greater than 160 ketones in the urine.  Case was [facility] [place].    Total Critical Care time of 35 minutes. Total critical care time documented does not include time spent on separately billed procedures for services of nurses or physician assistants. I personally saw and examined the patient. I have reviewed all diagnostic interpretations and treatment plans as written. I was present for the key portions of any procedures  performed and the inclusive time noted in any critical care statement. Critical care time includes patient management by me, time spent at the patients bedside,  time to review lab and imaging results, discussing patient care, documentation in the medical record, and time spent with family or caregiver.       Amount and/or Complexity of Data Reviewed  Clinical lab tests: reviewed  Tests in the medicine section of CPT®: ordered and reviewed  Discussion of test results with the performing providers: yes  Obtain history from someone other than the patient: yes (spouse)  Review and summarize past medical records: yes  Discuss the patient with other providers: yes    Risk of Complications, Morbidity, and/or Mortality  Presenting problems: moderate  Management options: moderate    Critical Care  Total time providing critical care: 30-74 minutes       Final diagnoses:   Dehydration   Hyperglycemia   Type 1 diabetes mellitus with ketoacidosis without coma (CMS/[facility])        Disposition:  ED Disposition     ED Disposition Condition Comment    Transfer to Another Facility             Documentation assistance provided by Gretta Acosta MD acting as scribe for Gretta Acosta MD. Information recorded by the scribe was done at my direction and has been verified and validated by me.        Mercy Fuentes  07/03/21 1851       Gretta Acosta MD  07/03/21 2046

## 2021-07-04 ENCOUNTER — HOSPITAL ENCOUNTER (INPATIENT)
Facility: HOSPITAL | Age: 31
LOS: 2 days | Discharge: HOME | End: 2021-07-06
Attending: HOSPITALIST | Admitting: INTERNAL MEDICINE

## 2021-07-04 VITALS
HEIGHT: 69 IN | DIASTOLIC BLOOD PRESSURE: 65 MMHG | HEART RATE: 106 BPM | WEIGHT: 128.09 LBS | RESPIRATION RATE: 14 BRPM | OXYGEN SATURATION: 96 % | SYSTOLIC BLOOD PRESSURE: 114 MMHG | TEMPERATURE: 97.8 F | BODY MASS INDEX: 18.97 KG/M2

## 2021-07-04 LAB
ACETONE BLD QL: ABNORMAL
ALBUMIN SERPL-MCNC: 3.6 G/DL (ref 3.5–5.2)
ALBUMIN SERPL-MCNC: 3.6 G/DL (ref 3.5–5.2)
ALBUMIN/GLOB SERPL: 1.6 G/DL
ALBUMIN/GLOB SERPL: 1.7 G/DL
ALP SERPL-CCNC: 54 U/L (ref 39–117)
ALP SERPL-CCNC: 59 U/L (ref 39–117)
ALT SERPL W P-5'-P-CCNC: 10 U/L (ref 1–41)
ALT SERPL W P-5'-P-CCNC: 12 U/L (ref 1–41)
ANION GAP SERPL CALCULATED.3IONS-SCNC: 10.1 MMOL/L (ref 5–15)
ANION GAP SERPL CALCULATED.3IONS-SCNC: 12.5 MMOL/L (ref 5–15)
ANION GAP SERPL CALCULATED.3IONS-SCNC: 15 MMOL/L (ref 5–15)
ANION GAP SERPL CALCULATED.3IONS-SCNC: 15.3 MMOL/L (ref 5–15)
ANION GAP SERPL CALCULATED.3IONS-SCNC: 17.3 MMOL/L (ref 5–15)
ANION GAP SERPL CALCULATED.3IONS-SCNC: 7.5 MMOL/L (ref 5–15)
AST SERPL-CCNC: 11 U/L (ref 1–40)
AST SERPL-CCNC: 12 U/L (ref 1–40)
BASOPHILS # BLD AUTO: 0.02 10*3/MM3 (ref 0–0.2)
BASOPHILS # BLD AUTO: 0.02 10*3/MM3 (ref 0–0.2)
BASOPHILS NFR BLD AUTO: 0.1 % (ref 0–1.5)
BASOPHILS NFR BLD AUTO: 0.1 % (ref 0–1.5)
BILIRUB SERPL-MCNC: 0.5 MG/DL (ref 0–1.2)
BILIRUB SERPL-MCNC: 0.5 MG/DL (ref 0–1.2)
BUN SERPL-MCNC: 10 MG/DL (ref 6–20)
BUN SERPL-MCNC: 12 MG/DL (ref 6–20)
BUN SERPL-MCNC: 15 MG/DL (ref 6–20)
BUN SERPL-MCNC: 19 MG/DL (ref 6–20)
BUN SERPL-MCNC: 19 MG/DL (ref 6–20)
BUN SERPL-MCNC: 25 MG/DL (ref 6–20)
BUN/CREAT SERPL: 13.9 (ref 7–25)
BUN/CREAT SERPL: 15.4 (ref 7–25)
BUN/CREAT SERPL: 19.2 (ref 7–25)
BUN/CREAT SERPL: 24.7 (ref 7–25)
BUN/CREAT SERPL: 25.7 (ref 7–25)
BUN/CREAT SERPL: 28.7 (ref 7–25)
CALCIUM SPEC-SCNC: 7.4 MG/DL (ref 8.6–10.5)
CALCIUM SPEC-SCNC: 7.4 MG/DL (ref 8.6–10.5)
CALCIUM SPEC-SCNC: 7.5 MG/DL (ref 8.6–10.5)
CALCIUM SPEC-SCNC: 7.5 MG/DL (ref 8.6–10.5)
CALCIUM SPEC-SCNC: 7.6 MG/DL (ref 8.6–10.5)
CALCIUM SPEC-SCNC: 7.7 MG/DL (ref 8.6–10.5)
CHLORIDE SERPL-SCNC: 107 MMOL/L (ref 98–107)
CHLORIDE SERPL-SCNC: 107 MMOL/L (ref 98–107)
CHLORIDE SERPL-SCNC: 108 MMOL/L (ref 98–107)
CHLORIDE SERPL-SCNC: 109 MMOL/L (ref 98–107)
CHLORIDE SERPL-SCNC: 110 MMOL/L (ref 98–107)
CHLORIDE SERPL-SCNC: 110 MMOL/L (ref 98–107)
CO2 SERPL-SCNC: 13.7 MMOL/L (ref 22–29)
CO2 SERPL-SCNC: 17.7 MMOL/L (ref 22–29)
CO2 SERPL-SCNC: 18 MMOL/L (ref 22–29)
CO2 SERPL-SCNC: 18.5 MMOL/L (ref 22–29)
CO2 SERPL-SCNC: 20.9 MMOL/L (ref 22–29)
CO2 SERPL-SCNC: 21.5 MMOL/L (ref 22–29)
CREAT SERPL-MCNC: 0.72 MG/DL (ref 0.76–1.27)
CREAT SERPL-MCNC: 0.74 MG/DL (ref 0.76–1.27)
CREAT SERPL-MCNC: 0.77 MG/DL (ref 0.76–1.27)
CREAT SERPL-MCNC: 0.78 MG/DL (ref 0.76–1.27)
CREAT SERPL-MCNC: 0.78 MG/DL (ref 0.76–1.27)
CREAT SERPL-MCNC: 0.87 MG/DL (ref 0.76–1.27)
DEPRECATED RDW RBC AUTO: 41.7 FL (ref 37–54)
DEPRECATED RDW RBC AUTO: 42.5 FL (ref 37–54)
EOSINOPHIL # BLD AUTO: 0 10*3/MM3 (ref 0–0.4)
EOSINOPHIL # BLD AUTO: 0 10*3/MM3 (ref 0–0.4)
EOSINOPHIL NFR BLD AUTO: 0 % (ref 0.3–6.2)
EOSINOPHIL NFR BLD AUTO: 0 % (ref 0.3–6.2)
ERYTHROCYTE [DISTWIDTH] IN BLOOD BY AUTOMATED COUNT: 12.7 % (ref 12.3–15.4)
ERYTHROCYTE [DISTWIDTH] IN BLOOD BY AUTOMATED COUNT: 13 % (ref 12.3–15.4)
GFR SERPL CREATININE-BSD FRML MDRD: 103 ML/MIN/1.73
GFR SERPL CREATININE-BSD FRML MDRD: 117 ML/MIN/1.73
GFR SERPL CREATININE-BSD FRML MDRD: 117 ML/MIN/1.73
GFR SERPL CREATININE-BSD FRML MDRD: 119 ML/MIN/1.73
GFR SERPL CREATININE-BSD FRML MDRD: 124 ML/MIN/1.73
GFR SERPL CREATININE-BSD FRML MDRD: 128 ML/MIN/1.73
GLOBULIN UR ELPH-MCNC: 2.1 GM/DL
GLOBULIN UR ELPH-MCNC: 2.2 GM/DL
GLUCOSE BLDC GLUCOMTR-MCNC: 120 MG/DL (ref 70–130)
GLUCOSE BLDC GLUCOMTR-MCNC: 131 MG/DL (ref 70–130)
GLUCOSE BLDC GLUCOMTR-MCNC: 134 MG/DL (ref 70–130)
GLUCOSE BLDC GLUCOMTR-MCNC: 135 MG/DL (ref 70–130)
GLUCOSE BLDC GLUCOMTR-MCNC: 136 MG/DL (ref 70–130)
GLUCOSE BLDC GLUCOMTR-MCNC: 141 MG/DL (ref 70–130)
GLUCOSE BLDC GLUCOMTR-MCNC: 144 MG/DL (ref 70–130)
GLUCOSE BLDC GLUCOMTR-MCNC: 165 MG/DL (ref 70–130)
GLUCOSE BLDC GLUCOMTR-MCNC: 166 MG/DL (ref 70–130)
GLUCOSE BLDC GLUCOMTR-MCNC: 177 MG/DL (ref 70–130)
GLUCOSE BLDC GLUCOMTR-MCNC: 187 MG/DL (ref 70–130)
GLUCOSE BLDC GLUCOMTR-MCNC: 192 MG/DL (ref 70–130)
GLUCOSE BLDC GLUCOMTR-MCNC: 193 MG/DL (ref 70–130)
GLUCOSE BLDC GLUCOMTR-MCNC: 202 MG/DL (ref 70–130)
GLUCOSE BLDC GLUCOMTR-MCNC: 224 MG/DL (ref 70–130)
GLUCOSE BLDC GLUCOMTR-MCNC: 224 MG/DL (ref 70–130)
GLUCOSE BLDC GLUCOMTR-MCNC: 228 MG/DL (ref 70–130)
GLUCOSE BLDC GLUCOMTR-MCNC: 242 MG/DL (ref 70–130)
GLUCOSE BLDC GLUCOMTR-MCNC: 248 MG/DL (ref 70–130)
GLUCOSE SERPL-MCNC: 146 MG/DL (ref 65–99)
GLUCOSE SERPL-MCNC: 147 MG/DL (ref 65–99)
GLUCOSE SERPL-MCNC: 148 MG/DL (ref 65–99)
GLUCOSE SERPL-MCNC: 198 MG/DL (ref 65–99)
GLUCOSE SERPL-MCNC: 198 MG/DL (ref 65–99)
GLUCOSE SERPL-MCNC: 247 MG/DL (ref 65–99)
HBA1C MFR BLD: 7.8 % (ref 4.8–5.6)
HCT VFR BLD AUTO: 34.2 % (ref 37.5–51)
HCT VFR BLD AUTO: 35 % (ref 37.5–51)
HGB BLD-MCNC: 11.6 G/DL (ref 13–17.7)
HGB BLD-MCNC: 11.8 G/DL (ref 13–17.7)
IMM GRANULOCYTES # BLD AUTO: 0.04 10*3/MM3 (ref 0–0.05)
IMM GRANULOCYTES # BLD AUTO: 0.05 10*3/MM3 (ref 0–0.05)
IMM GRANULOCYTES NFR BLD AUTO: 0.3 % (ref 0–0.5)
IMM GRANULOCYTES NFR BLD AUTO: 0.3 % (ref 0–0.5)
LYMPHOCYTES # BLD AUTO: 1 10*3/MM3 (ref 0.7–3.1)
LYMPHOCYTES # BLD AUTO: 2.12 10*3/MM3 (ref 0.7–3.1)
LYMPHOCYTES NFR BLD AUTO: 14.8 % (ref 19.6–45.3)
LYMPHOCYTES NFR BLD AUTO: 7.3 % (ref 19.6–45.3)
MAGNESIUM SERPL-MCNC: 1.5 MG/DL (ref 1.6–2.6)
MAGNESIUM SERPL-MCNC: 1.7 MG/DL (ref 1.6–2.6)
MAGNESIUM SERPL-MCNC: 1.8 MG/DL (ref 1.6–2.6)
MAGNESIUM SERPL-MCNC: 1.8 MG/DL (ref 1.6–2.6)
MCH RBC QN AUTO: 30.4 PG (ref 26.6–33)
MCH RBC QN AUTO: 30.5 PG (ref 26.6–33)
MCHC RBC AUTO-ENTMCNC: 33.1 G/DL (ref 31.5–35.7)
MCHC RBC AUTO-ENTMCNC: 34.5 G/DL (ref 31.5–35.7)
MCV RBC AUTO: 88.4 FL (ref 79–97)
MCV RBC AUTO: 91.6 FL (ref 79–97)
MONOCYTES # BLD AUTO: 0.27 10*3/MM3 (ref 0.1–0.9)
MONOCYTES # BLD AUTO: 0.94 10*3/MM3 (ref 0.1–0.9)
MONOCYTES NFR BLD AUTO: 2 % (ref 5–12)
MONOCYTES NFR BLD AUTO: 6.6 % (ref 5–12)
NEUTROPHILS NFR BLD AUTO: 11.2 10*3/MM3 (ref 1.7–7)
NEUTROPHILS NFR BLD AUTO: 12.36 10*3/MM3 (ref 1.7–7)
NEUTROPHILS NFR BLD AUTO: 78.2 % (ref 42.7–76)
NEUTROPHILS NFR BLD AUTO: 90.3 % (ref 42.7–76)
NRBC BLD AUTO-RTO: 0 /100 WBC (ref 0–0.2)
NRBC BLD AUTO-RTO: 0 /100 WBC (ref 0–0.2)
PHOSPHATE SERPL-MCNC: 0.9 MG/DL (ref 2.5–4.5)
PHOSPHATE SERPL-MCNC: 1.1 MG/DL (ref 2.5–4.5)
PHOSPHATE SERPL-MCNC: 1.3 MG/DL (ref 2.5–4.5)
PHOSPHATE SERPL-MCNC: 1.7 MG/DL (ref 2.5–4.5)
PHOSPHATE SERPL-MCNC: 2.4 MG/DL (ref 2.5–4.5)
PHOSPHATE SERPL-MCNC: 3.1 MG/DL (ref 2.5–4.5)
PLATELET # BLD AUTO: 253 10*3/MM3 (ref 140–450)
PLATELET # BLD AUTO: 269 10*3/MM3 (ref 140–450)
PMV BLD AUTO: 10.9 FL (ref 6–12)
PMV BLD AUTO: 11.5 FL (ref 6–12)
POTASSIUM SERPL-SCNC: 3.8 MMOL/L (ref 3.5–5.2)
POTASSIUM SERPL-SCNC: 3.8 MMOL/L (ref 3.5–5.2)
POTASSIUM SERPL-SCNC: 3.9 MMOL/L (ref 3.5–5.2)
POTASSIUM SERPL-SCNC: 4 MMOL/L (ref 3.5–5.2)
POTASSIUM SERPL-SCNC: 4 MMOL/L (ref 3.5–5.2)
POTASSIUM SERPL-SCNC: 4.2 MMOL/L (ref 3.5–5.2)
PROT SERPL-MCNC: 5.7 G/DL (ref 6–8.5)
PROT SERPL-MCNC: 5.8 G/DL (ref 6–8.5)
RBC # BLD AUTO: 3.82 10*6/MM3 (ref 4.14–5.8)
RBC # BLD AUTO: 3.87 10*6/MM3 (ref 4.14–5.8)
SODIUM SERPL-SCNC: 138 MMOL/L (ref 136–145)
SODIUM SERPL-SCNC: 138 MMOL/L (ref 136–145)
SODIUM SERPL-SCNC: 139 MMOL/L (ref 136–145)
SODIUM SERPL-SCNC: 140 MMOL/L (ref 136–145)
SODIUM SERPL-SCNC: 141 MMOL/L (ref 136–145)
SODIUM SERPL-SCNC: 143 MMOL/L (ref 136–145)
WBC # BLD AUTO: 13.69 10*3/MM3 (ref 3.4–10.8)
WBC # BLD AUTO: 14.33 10*3/MM3 (ref 3.4–10.8)

## 2021-07-04 PROCEDURE — 25010000002 ONDANSETRON PER 1 MG: Performed by: INTERNAL MEDICINE

## 2021-07-04 PROCEDURE — 82009 KETONE BODYS QUAL: CPT | Performed by: EMERGENCY MEDICINE

## 2021-07-04 PROCEDURE — 25010000003 INSULIN REGULAR HUMAN PER 5 UNITS: Performed by: INTERNAL MEDICINE

## 2021-07-04 PROCEDURE — 82962 GLUCOSE BLOOD TEST: CPT

## 2021-07-04 PROCEDURE — 99222 1ST HOSP IP/OBS MODERATE 55: CPT | Performed by: INTERNAL MEDICINE

## 2021-07-04 PROCEDURE — 80053 COMPREHEN METABOLIC PANEL: CPT | Performed by: EMERGENCY MEDICINE

## 2021-07-04 PROCEDURE — 85025 COMPLETE CBC W/AUTO DIFF WBC: CPT | Performed by: EMERGENCY MEDICINE

## 2021-07-04 PROCEDURE — 83735 ASSAY OF MAGNESIUM: CPT | Performed by: INTERNAL MEDICINE

## 2021-07-04 PROCEDURE — 25010000002 LORAZEPAM PER 2 MG: Performed by: INTERNAL MEDICINE

## 2021-07-04 PROCEDURE — 84100 ASSAY OF PHOSPHORUS: CPT | Performed by: INTERNAL MEDICINE

## 2021-07-04 PROCEDURE — 80053 COMPREHEN METABOLIC PANEL: CPT | Performed by: INTERNAL MEDICINE

## 2021-07-04 PROCEDURE — 96361 HYDRATE IV INFUSION ADD-ON: CPT

## 2021-07-04 PROCEDURE — 85025 COMPLETE CBC W/AUTO DIFF WBC: CPT | Performed by: INTERNAL MEDICINE

## 2021-07-04 PROCEDURE — 25010000003 MAGNESIUM SULFATE 4 GM/100ML SOLUTION: Performed by: INTERNAL MEDICINE

## 2021-07-04 PROCEDURE — 83036 HEMOGLOBIN GLYCOSYLATED A1C: CPT | Performed by: INTERNAL MEDICINE

## 2021-07-04 PROCEDURE — 25010000002 METOCLOPRAMIDE PER 10 MG: Performed by: INTERNAL MEDICINE

## 2021-07-04 RX ORDER — DEXTROSE, SODIUM CHLORIDE, AND POTASSIUM CHLORIDE 5; .45; .3 G/100ML; G/100ML; G/100ML
150 INJECTION INTRAVENOUS CONTINUOUS PRN
Status: DISCONTINUED | OUTPATIENT
Start: 2021-07-04 | End: 2021-07-04

## 2021-07-04 RX ORDER — DEXTROSE MONOHYDRATE 25 G/50ML
12.5 INJECTION, SOLUTION INTRAVENOUS AS NEEDED
Status: DISCONTINUED | OUTPATIENT
Start: 2021-07-04 | End: 2021-07-06 | Stop reason: HOSPADM

## 2021-07-04 RX ORDER — POTASSIUM CHLORIDE, DEXTROSE MONOHYDRATE AND SODIUM CHLORIDE 300; 5; 900 MG/100ML; G/100ML; MG/100ML
150 INJECTION, SOLUTION INTRAVENOUS CONTINUOUS PRN
Status: DISCONTINUED | OUTPATIENT
Start: 2021-07-04 | End: 2021-07-04

## 2021-07-04 RX ORDER — METOCLOPRAMIDE HYDROCHLORIDE 5 MG/ML
10 INJECTION INTRAMUSCULAR; INTRAVENOUS EVERY 6 HOURS
Status: DISCONTINUED | OUTPATIENT
Start: 2021-07-04 | End: 2021-07-06 | Stop reason: HOSPADM

## 2021-07-04 RX ORDER — MAGNESIUM SULFATE HEPTAHYDRATE 40 MG/ML
2 INJECTION, SOLUTION INTRAVENOUS AS NEEDED
Status: DISCONTINUED | OUTPATIENT
Start: 2021-07-04 | End: 2021-07-06 | Stop reason: HOSPADM

## 2021-07-04 RX ORDER — SODIUM CHLORIDE AND POTASSIUM CHLORIDE 300; 900 MG/100ML; MG/100ML
250 INJECTION, SOLUTION INTRAVENOUS CONTINUOUS PRN
Status: DISCONTINUED | OUTPATIENT
Start: 2021-07-04 | End: 2021-07-04

## 2021-07-04 RX ORDER — PROMETHAZINE HYDROCHLORIDE 25 MG/1
25 SUPPOSITORY RECTAL EVERY 6 HOURS PRN
Status: DISCONTINUED | OUTPATIENT
Start: 2021-07-04 | End: 2021-07-06 | Stop reason: HOSPADM

## 2021-07-04 RX ORDER — POTASSIUM CHLORIDE 750 MG/1
40 TABLET, FILM COATED, EXTENDED RELEASE ORAL AS NEEDED
Status: DISCONTINUED | OUTPATIENT
Start: 2021-07-04 | End: 2021-07-06 | Stop reason: HOSPADM

## 2021-07-04 RX ORDER — SODIUM CHLORIDE 0.9 % (FLUSH) 0.9 %
10 SYRINGE (ML) INJECTION AS NEEDED
Status: DISCONTINUED | OUTPATIENT
Start: 2021-07-04 | End: 2021-07-04

## 2021-07-04 RX ORDER — POTASSIUM CHLORIDE 7.45 MG/ML
10 INJECTION INTRAVENOUS
Status: DISCONTINUED | OUTPATIENT
Start: 2021-07-04 | End: 2021-07-06 | Stop reason: HOSPADM

## 2021-07-04 RX ORDER — SUCRALFATE 1 G/1
1 TABLET ORAL
Status: DISCONTINUED | OUTPATIENT
Start: 2021-07-04 | End: 2021-07-06 | Stop reason: HOSPADM

## 2021-07-04 RX ORDER — SODIUM CHLORIDE 0.9 % (FLUSH) 0.9 %
10 SYRINGE (ML) INJECTION ONCE AS NEEDED
Status: DISCONTINUED | OUTPATIENT
Start: 2021-07-04 | End: 2021-07-04

## 2021-07-04 RX ORDER — ONDANSETRON 2 MG/ML
4 INJECTION INTRAMUSCULAR; INTRAVENOUS EVERY 4 HOURS PRN
Status: DISCONTINUED | OUTPATIENT
Start: 2021-07-04 | End: 2021-07-04

## 2021-07-04 RX ORDER — DEXTROSE AND SODIUM CHLORIDE 5; .45 G/100ML; G/100ML
150 INJECTION, SOLUTION INTRAVENOUS CONTINUOUS PRN
Status: DISCONTINUED | OUTPATIENT
Start: 2021-07-04 | End: 2021-07-04

## 2021-07-04 RX ORDER — PANTOPRAZOLE SODIUM 40 MG/10ML
40 INJECTION, POWDER, LYOPHILIZED, FOR SOLUTION INTRAVENOUS EVERY 12 HOURS SCHEDULED
Status: DISCONTINUED | OUTPATIENT
Start: 2021-07-04 | End: 2021-07-06 | Stop reason: HOSPADM

## 2021-07-04 RX ORDER — SODIUM CHLORIDE 450 MG/100ML
250 INJECTION, SOLUTION INTRAVENOUS CONTINUOUS PRN
Status: DISCONTINUED | OUTPATIENT
Start: 2021-07-04 | End: 2021-07-04

## 2021-07-04 RX ORDER — ONDANSETRON 2 MG/ML
4 INJECTION INTRAMUSCULAR; INTRAVENOUS EVERY 4 HOURS
Status: DISCONTINUED | OUTPATIENT
Start: 2021-07-04 | End: 2021-07-06 | Stop reason: HOSPADM

## 2021-07-04 RX ORDER — DEXTROSE, SODIUM CHLORIDE, AND POTASSIUM CHLORIDE 5; .9; .15 G/100ML; G/100ML; G/100ML
150 INJECTION INTRAVENOUS CONTINUOUS PRN
Status: DISCONTINUED | OUTPATIENT
Start: 2021-07-04 | End: 2021-07-04

## 2021-07-04 RX ORDER — MAGNESIUM SULFATE HEPTAHYDRATE 40 MG/ML
4 INJECTION, SOLUTION INTRAVENOUS AS NEEDED
Status: DISCONTINUED | OUTPATIENT
Start: 2021-07-04 | End: 2021-07-06 | Stop reason: HOSPADM

## 2021-07-04 RX ORDER — SODIUM CHLORIDE 9 MG/ML
10 INJECTION, SOLUTION INTRAVENOUS CONTINUOUS PRN
Status: DISCONTINUED | OUTPATIENT
Start: 2021-07-04 | End: 2021-07-04

## 2021-07-04 RX ORDER — SODIUM CHLORIDE 9 MG/ML
250 INJECTION, SOLUTION INTRAVENOUS CONTINUOUS PRN
Status: DISCONTINUED | OUTPATIENT
Start: 2021-07-04 | End: 2021-07-04

## 2021-07-04 RX ORDER — DULOXETIN HYDROCHLORIDE 30 MG/1
30 CAPSULE, DELAYED RELEASE ORAL DAILY
Status: DISCONTINUED | OUTPATIENT
Start: 2021-07-04 | End: 2021-07-06 | Stop reason: HOSPADM

## 2021-07-04 RX ORDER — SODIUM CHLORIDE AND POTASSIUM CHLORIDE 150; 450 MG/100ML; MG/100ML
250 INJECTION, SOLUTION INTRAVENOUS CONTINUOUS PRN
Status: DISCONTINUED | OUTPATIENT
Start: 2021-07-04 | End: 2021-07-04

## 2021-07-04 RX ORDER — CALCIUM GLUCONATE 20 MG/ML
1 INJECTION, SOLUTION INTRAVENOUS AS NEEDED
Status: DISCONTINUED | OUTPATIENT
Start: 2021-07-04 | End: 2021-07-06 | Stop reason: HOSPADM

## 2021-07-04 RX ORDER — LORAZEPAM 2 MG/ML
1 INJECTION INTRAMUSCULAR ONCE
Status: COMPLETED | OUTPATIENT
Start: 2021-07-04 | End: 2021-07-04

## 2021-07-04 RX ORDER — DEXTROSE AND SODIUM CHLORIDE 5; .9 G/100ML; G/100ML
150 INJECTION, SOLUTION INTRAVENOUS CONTINUOUS PRN
Status: DISCONTINUED | OUTPATIENT
Start: 2021-07-04 | End: 2021-07-04

## 2021-07-04 RX ORDER — POTASSIUM CHLORIDE 1.5 G/1.77G
40 POWDER, FOR SOLUTION ORAL AS NEEDED
Status: DISCONTINUED | OUTPATIENT
Start: 2021-07-04 | End: 2021-07-06 | Stop reason: HOSPADM

## 2021-07-04 RX ORDER — SODIUM CHLORIDE AND POTASSIUM CHLORIDE 150; 900 MG/100ML; MG/100ML
250 INJECTION, SOLUTION INTRAVENOUS CONTINUOUS PRN
Status: DISCONTINUED | OUTPATIENT
Start: 2021-07-04 | End: 2021-07-04

## 2021-07-04 RX ORDER — DEXTROSE, SODIUM CHLORIDE, AND POTASSIUM CHLORIDE 5; .45; .15 G/100ML; G/100ML; G/100ML
150 INJECTION INTRAVENOUS CONTINUOUS PRN
Status: DISCONTINUED | OUTPATIENT
Start: 2021-07-04 | End: 2021-07-04

## 2021-07-04 RX ORDER — SODIUM CHLORIDE 0.9 % (FLUSH) 0.9 %
3 SYRINGE (ML) INJECTION EVERY 12 HOURS SCHEDULED
Status: DISCONTINUED | OUTPATIENT
Start: 2021-07-04 | End: 2021-07-06 | Stop reason: HOSPADM

## 2021-07-04 RX ADMIN — LORAZEPAM 1 MG: 2 INJECTION INTRAMUSCULAR; INTRAVENOUS at 08:19

## 2021-07-04 RX ADMIN — SODIUM CHLORIDE 6 UNITS/HR: 9 INJECTION, SOLUTION INTRAVENOUS at 05:02

## 2021-07-04 RX ADMIN — SODIUM CHLORIDE 2.5 UNITS/HR: 9 INJECTION, SOLUTION INTRAVENOUS at 12:07

## 2021-07-04 RX ADMIN — SODIUM CHLORIDE 4.5 UNITS/HR: 9 INJECTION, SOLUTION INTRAVENOUS at 09:09

## 2021-07-04 RX ADMIN — Medication 2 PACKET: at 14:00

## 2021-07-04 RX ADMIN — PROMETHAZINE HYDROCHLORIDE 25 MG: 25 SUPPOSITORY RECTAL at 06:27

## 2021-07-04 RX ADMIN — METOCLOPRAMIDE HYDROCHLORIDE 10 MG: 5 INJECTION INTRAMUSCULAR; INTRAVENOUS at 03:51

## 2021-07-04 RX ADMIN — ONDANSETRON 4 MG: 2 INJECTION INTRAMUSCULAR; INTRAVENOUS at 06:14

## 2021-07-04 RX ADMIN — SODIUM CHLORIDE 1.5 UNITS/HR: 9 INJECTION, SOLUTION INTRAVENOUS at 13:07

## 2021-07-04 RX ADMIN — ONDANSETRON 4 MG: 2 INJECTION INTRAMUSCULAR; INTRAVENOUS at 14:09

## 2021-07-04 RX ADMIN — ONDANSETRON 4 MG: 2 INJECTION INTRAMUSCULAR; INTRAVENOUS at 09:15

## 2021-07-04 RX ADMIN — POTASSIUM CHLORIDE, DEXTROSE MONOHYDRATE AND SODIUM CHLORIDE 150 ML/HR: 150; 5; 450 INJECTION, SOLUTION INTRAVENOUS at 05:30

## 2021-07-04 RX ADMIN — SODIUM CHLORIDE 3.5 UNITS/HR: 9 INJECTION, SOLUTION INTRAVENOUS at 11:02

## 2021-07-04 RX ADMIN — SODIUM CHLORIDE 9 UNITS/HR: 9 INJECTION, SOLUTION INTRAVENOUS at 08:10

## 2021-07-04 RX ADMIN — METOCLOPRAMIDE HYDROCHLORIDE 10 MG: 5 INJECTION INTRAMUSCULAR; INTRAVENOUS at 15:58

## 2021-07-04 RX ADMIN — PANTOPRAZOLE SODIUM 40 MG: 40 INJECTION, POWDER, FOR SOLUTION INTRAVENOUS at 21:01

## 2021-07-04 RX ADMIN — METOCLOPRAMIDE HYDROCHLORIDE 10 MG: 5 INJECTION INTRAMUSCULAR; INTRAVENOUS at 09:15

## 2021-07-04 RX ADMIN — POTASSIUM CHLORIDE, DEXTROSE MONOHYDRATE AND SODIUM CHLORIDE 150 ML/HR: 150; 5; 450 INJECTION, SOLUTION INTRAVENOUS at 10:06

## 2021-07-04 RX ADMIN — ONDANSETRON 4 MG: 2 INJECTION INTRAMUSCULAR; INTRAVENOUS at 21:31

## 2021-07-04 RX ADMIN — POTASSIUM PHOSPHATE, MONOBASIC AND POTASSIUM PHOSPHATE, DIBASIC 45 MMOL: 224; 236 INJECTION, SOLUTION, CONCENTRATE INTRAVENOUS at 22:10

## 2021-07-04 RX ADMIN — SODIUM CHLORIDE 0.5 UNITS/HR: 9 INJECTION, SOLUTION INTRAVENOUS at 14:00

## 2021-07-04 RX ADMIN — SODIUM CHLORIDE 2000 ML: 9 INJECTION, SOLUTION INTRAVENOUS at 04:03

## 2021-07-04 RX ADMIN — ONDANSETRON 4 MG: 2 INJECTION INTRAMUSCULAR; INTRAVENOUS at 17:41

## 2021-07-04 RX ADMIN — SUCRALFATE 1 G: 1 TABLET ORAL at 21:01

## 2021-07-04 RX ADMIN — PANTOPRAZOLE SODIUM 40 MG: 40 INJECTION, POWDER, FOR SOLUTION INTRAVENOUS at 08:19

## 2021-07-04 RX ADMIN — METOCLOPRAMIDE HYDROCHLORIDE 10 MG: 5 INJECTION INTRAMUSCULAR; INTRAVENOUS at 21:31

## 2021-07-04 RX ADMIN — SODIUM CHLORIDE, PRESERVATIVE FREE 3 ML: 5 INJECTION INTRAVENOUS at 21:02

## 2021-07-04 RX ADMIN — MAGNESIUM SULFATE HEPTAHYDRATE 4 G: 4 INJECTION, SOLUTION INTRAVENOUS at 21:30

## 2021-07-04 RX ADMIN — SUCRALFATE 1 G: 1 TABLET ORAL at 17:41

## 2021-07-04 NOTE — PLAN OF CARE
Goal Outcome Evaluation:  Plan of Care Reviewed With: patient           Outcome Summary: Pt comes up with complaints of nausea and vomiting with DKA blood glucose in the 200's on 6 units per insulin drip currently and NPO. Will consult gastrology. 2000L of normal saline infusing. Currently waiting on Aiden to call back for any prn orders for nausea.

## 2021-07-04 NOTE — CONSULTS
[facility]  Initial Inpatient Consult Note    Referring Provider: Dr. Wolfe    Reason for Consultation: Gastroparesis    Subjective     History of present illness:    30 y.o. male, known to me, with past medical history significant for severe gastroparesis related to type 1 diabetes.  He has had difficulty controlling his symptoms over the past several months much more so than in the past.  He is actually set up to see the [facility] motility clinic.  He has been managing his symptoms with a modified diet and outpatient Reglan.  He has had multiple hospitalizations over the past several months.    Patient was transferred from [facility] due to intractable nausea and vomiting.  He was noted to have labs consistent with DKA and therefore was admitted to the ICU.  He is currently on insulin drip.    He reports that he started to have intractable nausea and vomiting this week.  As a result his sugars then became difficult to control.  He denies abdominal pain.  No diarrhea or blood in the stool.  No urinary symptoms suggesting an infection.    Past Medical History:  Past Medical History:   Diagnosis Date   • Anxiety    • Anxiety and depression    • Colon polyp 8/30/2017    1   • Depression    • Diabetes type 1, uncontrolled (CMS/HCC)     diagnosed at age 17   • Dyspepsia    • Family hx of colon cancer    • Gastroparesis    • GERD (gastroesophageal reflux disease)      Past Surgical History:  Past Surgical History:   Procedure Laterality Date   • COLONOSCOPY N/A 8/30/2017    One 3 mm polyp in the sigmoid colon, Granularity in the terminal ileum   • ENDOSCOPY N/A 4/15/2021    Procedure: ESOPHAGOGASTRODUODENOSCOPY with biopsies;  Surgeon: Da Fleming MD;  Location: [facility] ENDOSCOPY;  Service: Gastroenterology;  Laterality: N/A;  PRE:   nausea, vomiting, hematemesis  POST:  reflux esophagitis, erosive gastropathy, scalloped mucosa in duodenum ? celiac      Social History:    Social History     Tobacco Use   • Smoking status: Current Every Day Smoker     Packs/day: 0.50     Years: 15.00     Pack years: 7.50     Types: Cigarettes     Start date: 2005   • Smokeless tobacco: Never Used   Substance Use Topics   • Alcohol use: Yes     Comment: 3-4 beers on weekend      Family History:  Family History   Problem Relation Age of Onset   • Cancer Mother    • Hypertension Father    • Depression Paternal Uncle    • Hypertension Maternal Grandmother    • Irritable bowel syndrome Maternal Grandmother    • Colon cancer Maternal Grandmother    • Hypertension Maternal Grandfather    • Alcohol abuse Maternal Grandfather    • Hypertension Paternal Grandmother    • Cancer Paternal Grandmother    • COPD Paternal Grandmother    • Depression Paternal Grandmother    • Heart disease Paternal Grandfather    • Hypertension Paternal Grandfather        Home Meds:  Medications Prior to Admission   Medication Sig Dispense Refill Last Dose   • Acetone, Urine, Test (Ketone Test) strip 1 strip by In Vitro route As Needed (nausea, vomiting with elevated glucose levels). 50 strip 3 Past Month at Unknown time   • Continuous Blood Gluc Sensor (Dexcom G6 Sensor)    Past Month at Unknown time   • DULoxetine (CYMBALTA) 30 MG capsule Take 1 capsule by mouth Daily. 90 capsule 3 7/4/2021 at Unknown time   • Glucagon, rDNA, (Glucagon Emergency) 1 MG kit As Needed.   Past Month at Unknown time   • Insulin Disposable Pump (OmniPod Dash 5 Pack Pods) misc 0.85 Units. Basal rate recently decreased 1.5 u/hr to 0.85 u/hr   1 unit per 12 carb  Correction factor 1unit=45mg/dL  Goal 150    7/4/2021 at Unknown time   • metoclopramide (Reglan) 5 MG tablet Take 1 tablet by mouth 4 (Four) Times a Day Before Meals & at Bedtime As Needed (nausea/vomiting/gatroparesis). 120 tablet 3 7/4/2021 at Unknown time   • multivitamin with minerals tablet tablet Take 1 tablet by mouth Daily. Chewable gummie   7/4/2021 at Unknown time   • ondansetron ODT  (ZOFRAN-ODT) 4 MG disintegrating tablet Take 4 mg by mouth Every 8 (Eight) Hours As Needed.   Past Month at Unknown time   • pantoprazole (PROTONIX) 40 MG EC tablet Take 1 tablet by mouth 2 (Two) Times a Day Before Meals for 180 days. 60 tablet 5 7/4/2021 at Unknown time   • rOPINIRole (REQUIP) 0.5 MG tablet Take 0.5 mg by mouth Daily As Needed.   Past Month at Unknown time   • sucralfate (Carafate) 1 g tablet Take 1 tablet by mouth 2 (two) times a day for 120 days. 60 tablet 3 Past Month at Unknown time   • promethazine (PHENERGAN) 25 MG suppository Insert 1 suppository into the rectum Every 6 (Six) Hours As Needed for Nausea or Vomiting. 20 suppository 0 More than a month at Unknown time     Current Meds:   DULoxetine, 30 mg, Oral, Daily  metoclopramide, 10 mg, Intravenous, Q6H  pantoprazole, 40 mg, Intravenous, Q12H  sodium chloride, 3 mL, Intravenous, Q12H  sucralfate, 1 g, Oral, 4x Daily AC & at Bedtime      Allergies:  No Known Allergies  Review of Systems  Pertinent items are noted in HPI, all other systems reviewed and negative     Objective     Vital Signs  Temp:  [97.8 °F (36.6 °C)-98.3 °F (36.8 °C)] 98.3 °F (36.8 °C)  Heart Rate:  [] 114  Resp:  [12-20] 12  BP: (108-164)/(60-90) 164/84  Physical Exam:  General Appearance:    Alert, cooperative, sitting in bed retching.   Head:    Normocephalic, without obvious abnormality, atraumatic   Eyes:          conjunctivae and sclerae normal, no   icterus   Throat:   no thrush, oral mucosa moist   Neck:   Supple, no adenopathy   Lungs:     Clear to auscultation bilaterally    Heart:    Regular rhythm and normal rate    Chest Wall:    No abnormalities observed   Abdomen:     Soft, nondistended, nontender; normal bowel sounds   Extremities:   no edema, no redness   Skin:   No bruising or rash   Psychiatric:  normal mood and insight     Results Review:   I reviewed the patient's new clinical results.    Results from last 7 days   Lab Units 07/04/21  2654  07/04/21  0022 07/03/21  1741   WBC 10*3/mm3 14.33* 13.69* 14.93*   HEMOGLOBIN g/dL 11.8* 11.6* 15.0   HEMATOCRIT % 34.2* 35.0* 42.2   PLATELETS 10*3/mm3 269 253 355     Results from last 7 days   Lab Units 07/04/21  0357 07/04/21  0022 07/03/21 2122 07/03/21  1741   SODIUM mmol/L 141 138 138 137   POTASSIUM mmol/L 3.9 4.2 4.4 4.4   CHLORIDE mmol/L 108* 107 100 97*   CO2 mmol/L 17.7* 13.7* 17.5* 17.9*   BUN mg/dL 19 25* 29* 29*   CREATININE mg/dL 0.77 0.87 0.96 1.00   CALCIUM mg/dL 7.6* 7.5* 8.6 10.3   BILIRUBIN mg/dL 0.5 0.5  --  1.0   ALK PHOS U/L 54 59  --  83   ALT (SGPT) U/L 12 10  --  16   AST (SGOT) U/L 11 12  --  16   GLUCOSE mg/dL 148* 247* 296* 225*         Lab Results   Lab Value Date/Time    LIPASE 16 07/03/2021 1741    LIPASE 15 05/23/2021 1328    LIPASE 11 (L) 04/22/2021 2241    LIPASE 23 04/14/2021 1831    LIPASE 12 (L) 03/20/2021 1834       Radiology:  No orders to display       Assessment/Plan   Patient Active Problem List   Diagnosis   • Type 1 diabetes mellitus with hyperglycemia (CMS/HCC)   • Fecal incontinence   • Erectile dysfunction   • Anxiety and depression   • Hyperplastic colonic polyp   • Gastroparesis   • Family history of colorectal cancer   • Sepsis without acute organ dysfunction (CMS/HCC)   • Alcohol abuse   • Tobacco abuse   • Acute upper GI bleed   • Non-intractable vomiting with nausea   • Leukocytosis   • Marijuana abuse       Assessment:  1. Gastroparesis  2. DKA  3. Intractable nausea and vomiting  4. History of reflux esophagitis  5. Dehydration    Plan:  · Continue scheduled Reglan  · Schedule Zofran  · Alternated Phenergan with Zofran  · We will give a one-time dose of Ativan to see if we can break his retching  · DKA management per intensivist  · Ultimately patient will benefit from motility evaluation at [facility] with hopefully gastric stimulator.  He is scheduled to see them in August.      I discussed the patients findings and my recommendations with  patient and nursing staff.    Lena Aguayo MD

## 2021-07-04 NOTE — PLAN OF CARE
DKA Protocol dc'd, home insulin pump restarted but without continuous glucose monitor sensor, SO to obtain in AM, hence accucheck Q2, CC Diet, poor appetite, N/V improved with Zofran, Reglan for gastroparesis; re-check phos 2000,

## 2021-07-04 NOTE — H&P
History and Physical    Patient Name: Morro Blancas  Age/Sex: 30 y.o. male  : 1990  MRN: [id number]    Date of Admission: 2021  Date of Encounter Visit: 21  Encounter Provider: Naya Wolfe MD  Place of Service: [facility]   Patient Care Team:  Gale Banegas APRN as PCP - General (Family Medicine)  Ysabel Javier MD as Consulting Physician (Endocrinology)      Subjective:     Chief Complaint: Refractory nausea vomiting    History of Present Illness:  Morro Blancas is a 30 y.o. male with history of type 1 diabetes mellitus with severe gastroparesis that has resulted and several prior admission with uncontrolled diabetes and intractable nausea and vomiting.  Has been having problem for the last 5 to 7 days, eventually ended up in the emergency room at [facility] and requested to be admitted to [facility] [place] where he usually gets most of his care.  Patient has multiple prior admission and his discharge summary from this admission were reviewed.  He was followed by GI and was supposed to be set up with the motility clinic sometime this week  Patient denies any fever or chills  He does have nausea and vomiting and he noticed some blood in his vomitus but it was not large quantities  He had a bowel movement 2 hours prior to transfer and it was not bloody  Patient was supposed to be managed without the insulin drip however on his repeat chemistry his anion gap was still elevated and his bicarbonate was trending down so he was upgraded from a telemetry transfer and was admitted to the ICU instead for management of DKA.  He had 7 units of insulin IV push and has not been started on the insulin drip yet but he did receive 4000 cc of crystalloids  Patient is already feeling slightly better, his breathing is no longer heavy or deep in his acidosis was not that severe.  No state of any peptic ulcer disease or upper GI bleed in the past, no significant liver disease or alcoholism.  No  altered mental status  He did have similar previous episode  His insulin pump is currently turned off but he was getting his sliding scale insulin until 2 hours prior to presentation to the outlying facility    Pulmonary Functions Testing Results:    No results found for: FEV1, FVC, ANY1BKS, TLC, DLCO    Review of Systems:   Review of Systems   Constitutional: Negative.    HENT: Negative.    Eyes: Negative.    Respiratory: Negative.    Cardiovascular: Negative.    Gastrointestinal: Positive for abdominal pain, nausea and vomiting. Negative for abdominal distention, anal bleeding, blood in stool, constipation, diarrhea and rectal pain.   Endocrine: Negative.    Genitourinary: Negative.    Musculoskeletal: Negative.    Skin: Negative.    Allergic/Immunologic: Negative.    Neurological: Negative.    Hematological: Negative.    Psychiatric/Behavioral: Negative.        Past Medical History:  Past Medical History:   Diagnosis Date   • Anxiety    • Anxiety and depression    • Colon polyp 8/30/2017    1   • Depression    • Diabetes type 1, uncontrolled (CMS/HCC)     diagnosed at age 17   • Dyspepsia    • Family hx of colon cancer    • Gastroparesis    • GERD (gastroesophageal reflux disease)        Past Surgical History:   Procedure Laterality Date   • COLONOSCOPY N/A 8/30/2017    One 3 mm polyp in the sigmoid colon, Granularity in the terminal ileum   • ENDOSCOPY N/A 4/15/2021    Procedure: ESOPHAGOGASTRODUODENOSCOPY with biopsies;  Surgeon: Da Fleming MD;  Location: [facility] ENDOSCOPY;  Service: Gastroenterology;  Laterality: N/A;  PRE:   nausea, vomiting, hematemesis  POST:  reflux esophagitis, erosive gastropathy, scalloped mucosa in duodenum ? celiac       Home Medications:   Medications Prior to Admission   Medication Sig Dispense Refill Last Dose   • Acetone, Urine, Test (Ketone Test) strip 1 strip by In Vitro route As Needed (nausea, vomiting with elevated glucose levels). 50 strip 3    • Continuous Blood Gluc  Sensor (Dexcom G6 Sensor)       • DULoxetine (CYMBALTA) 30 MG capsule Take 1 capsule by mouth Daily. 90 capsule 3    • Glucagon, rDNA, (Glucagon Emergency) 1 MG kit As Needed.      • Insulin Disposable Pump (OmniPod Dash 5 Pack Pods) misc 0.85 Units. Basal rate recently decreased 1.5 u/hr to 0.85 u/hr   1 unit per 12 carb  Correction factor 1unit=45mg/dL  Goal 150       • metoclopramide (Reglan) 5 MG tablet Take 1 tablet by mouth 4 (Four) Times a Day Before Meals & at Bedtime As Needed (nausea/vomiting/gatroparesis). 120 tablet 3    • multivitamin with minerals tablet tablet Take 1 tablet by mouth Daily. Chewable gummie      • ondansetron ODT (ZOFRAN-ODT) 4 MG disintegrating tablet Take 4 mg by mouth Every 8 (Eight) Hours As Needed.      • pantoprazole (PROTONIX) 40 MG EC tablet Take 1 tablet by mouth 2 (Two) Times a Day Before Meals for 180 days. 60 tablet 5    • promethazine (PHENERGAN) 25 MG suppository Insert 1 suppository into the rectum Every 6 (Six) Hours As Needed for Nausea or Vomiting. 20 suppository 0    • rOPINIRole (REQUIP) 0.5 MG tablet Take 0.5 mg by mouth Daily As Needed.      • sucralfate (Carafate) 1 g tablet Take 1 tablet by mouth 2 (two) times a day for 120 days. 60 tablet 3        Inpatient Medications:  Scheduled Meds:DULoxetine, 30 mg, Oral, Daily  metoclopramide, 10 mg, Intravenous, Q6H  pantoprazole, 40 mg, Intravenous, Q12H  sodium chloride, 2,000 mL, Intravenous, Once  sodium chloride, 3 mL, Intravenous, Q12H  sucralfate, 1 g, Oral, 4x Daily AC & at Bedtime      Continuous Infusions:dextrose 5 % and sodium chloride 0.45 %, 150 mL/hr  dextrose 5 % and sodium chloride 0.45 % with KCl 20 mEq/L, 150 mL/hr  dextrose 5 % and sodium chloride 0.45 % with KCl 40 mEq/L, 150 mL/hr  dextrose 5 % and sodium chloride 0.9 %, 150 mL/hr  dextrose 5 % and sodium chloride 0.9 % with KCl 20 mEq, 150 mL/hr  dextrose 5% and sodium chloride 0.9% with KCl 40 mEq/L, 150 mL/hr  insulin, 0-20 Units/hr  custom IV KCl  infusion builder, 250 mL/hr  sodium chloride, 250 mL/hr  sodium chloride 0.45 % with KCl 20 mEq, 250 mL/hr  sodium chloride, 250 mL/hr  sodium chloride, 10 mL/hr  sodium chloride 0.9 % with KCl 20 mEq, 250 mL/hr  sodium chloride 0.9 % with KCl 40 mEq/L, 250 mL/hr      PRN Meds:.•  dextrose  •  dextrose 5 % and sodium chloride 0.45 %  •  dextrose 5 % and sodium chloride 0.45 % with KCl 20 mEq/L  •  dextrose 5 % and sodium chloride 0.45 % with KCl 40 mEq/L  •  dextrose 5 % and sodium chloride 0.9 %  •  dextrose 5 % and sodium chloride 0.9 % with KCl 20 mEq  •  dextrose 5% and sodium chloride 0.9% with KCl 40 mEq/L  •  magnesium sulfate **OR** magnesium sulfate **OR** magnesium sulfate  •  potassium chloride **OR** potassium chloride **OR** potassium chloride  •  potassium phosphate infusion greater than 15 mMoles **OR** potassium phosphate infusion greater than 15 mMoles **OR** potassium phosphate **OR** sodium phosphate IVPB **OR** sodium phosphate IVPB  •  promethazine  •  custom IV KCl infusion builder  •  sodium chloride  •  sodium chloride 0.45 % with KCl 20 mEq  •  sodium chloride  •  sodium chloride  •  sodium chloride  •  sodium chloride  •  sodium chloride 0.9 % with KCl 20 mEq  •  sodium chloride 0.9 % with KCl 40 mEq/L    Allergies:  No Known Allergies    Past Social History:  Social History     Socioeconomic History   • Marital status: Significant Other     Spouse name: Not on file   • Number of children: Not on file   • Years of education: Not on file   • Highest education level: Not on file   Tobacco Use   • Smoking status: Current Every Day Smoker     Packs/day: 0.50     Years: 15.00     Pack years: 7.50     Types: Cigarettes     Start date: 2005   • Smokeless tobacco: Never Used   Vaping Use   • Vaping Use: Never used   Substance and Sexual Activity   • Alcohol use: Yes     Comment: 3-4 beers on weekend   • Drug use: Yes     Frequency: 7.0 times per week     Types: Marijuana     Comment: DAILY   •  Sexual activity: Defer       Past Family History:  Family History   Problem Relation Age of Onset   • Cancer Mother    • Hypertension Father    • Depression Paternal Uncle    • Hypertension Maternal Grandmother    • Irritable bowel syndrome Maternal Grandmother    • Colon cancer Maternal Grandmother    • Hypertension Maternal Grandfather    • Alcohol abuse Maternal Grandfather    • Hypertension Paternal Grandmother    • Cancer Paternal Grandmother    • COPD Paternal Grandmother    • Depression Paternal Grandmother    • Heart disease Paternal Grandfather    • Hypertension Paternal Grandfather            Objective:   Temp:  [97.8 °F (36.6 °C)-98.3 °F (36.8 °C)] 98.3 °F (36.8 °C)  Heart Rate:  [] 104  Resp:  [12-20] 12  BP: (108-160)/(60-90) 160/89   SpO2:  [96 %-100 %] 98 %  on    Device (Oxygen Therapy): room air  No intake or output data in the 24 hours ending 07/04/21 0326  Body mass index is 20.77 kg/m².      07/04/21  0323   Weight: 63.8 kg (140 lb 10.5 oz)     Weight change:     Physical Exam:   Physical Exam   General:   Sickly looking gentleman but awake and appropriate, oriented x4                   Head:    Normocephalic, atraumatic.   Eyes:          Conjunctivae and sclerae normal, no icterus, PERRLA   Throat:   No oral lesions, no thrush, dry mucous membranes.    Neck:   Supple, trachea midline.   Lungs:     Normal chest on inspection, CTAB, no wheezes. No rhonchi. No crackles. Respirations regular, even and unlabored.     Heart:    Regular rhythm and normal rate.  No murmurs, gallops, or rubs noted.   Abdomen:     Soft, tender to deep palpation with no rebound, non-distended, positive bowel sounds.    Extremities:   No clubbing, cyanosis, or edema.     Pulses:   Pulses palpable and equal bilaterally.    Skin:   No bleeding or rash.   Neuro:   Non-focal.  Moves all extremities well.    Psychiatric:   Normal mood and affect.     Lab Review:   Results from last 7 days   Lab Units 07/04/21  0022  07/03/21 2122 07/03/21  1741   SODIUM mmol/L 138 138 137   POTASSIUM mmol/L 4.2 4.4 4.4   CHLORIDE mmol/L 107 100 97*   CO2 mmol/L 13.7* 17.5* 17.9*   BUN mg/dL 25* 29* 29*   CREATININE mg/dL 0.87 0.96 1.00   GLUCOSE mg/dL 247* 296* 225*   CALCIUM mg/dL 7.5* 8.6 10.3   AST (SGOT) U/L 12  --  16   ALT (SGPT) U/L 10  --  16   ALBUMIN g/dL 3.60  --  5.10         Results from last 7 days   Lab Units 07/04/21 0022 07/03/21  1741   WBC 10*3/mm3 13.69* 14.93*   HEMOGLOBIN g/dL 11.6* 15.0   HEMATOCRIT % 35.0* 42.2   PLATELETS 10*3/mm3 253 355   MCV fL 91.6 85.3   MCH pg 30.4 30.3   MCHC g/dL 33.1 35.5   RDW % 12.7 12.3   RDW-SD fl 42.5 37.8   MPV fL 10.9 11.0   NEUTROPHIL % % 90.3* 87.6*   LYMPHOCYTE % % 7.3* 9.8*   MONOCYTES % % 2.0* 2.0*   EOSINOPHIL % % 0.0* 0.0*   BASOPHIL % % 0.1 0.3   IMM GRAN % % 0.3 0.3   NEUTROS ABS 10*3/mm3 12.36* 13.08*   LYMPHS ABS 10*3/mm3 1.00 1.47   MONOS ABS 10*3/mm3 0.27 0.30   EOS ABS 10*3/mm3 0.00 0.00   BASOS ABS 10*3/mm3 0.02 0.04   IMMATURE GRANS (ABS) 10*3/mm3 0.04 0.04   NRBC /100 WBC 0.0 0.0                   Invalid input(s): LDLCALC          Results from last 7 days   Lab Units 07/03/21  2218   PH, ARTERIAL pH units 7.321*   PCO2, ARTERIAL mm Hg 35.9   PO2 ART mm Hg 95.1   HCO3 ART mmol/L 18.1*             Results from last 7 days   Lab Units 07/03/21  1741   LIPASE U/L 16         Results from last 7 days   Lab Units 07/03/21  1917   NITRITE UA  Negative   WBC UA /HPF 0-2*   BACTERIA UA /HPF None Seen   SQUAM EPITHEL UA /HPF 0-2               Imaging:  Imaging Results (Most Recent)     None          I personally viewed and interpreted the patient's imaging studies.    Assessment:     DKA  Diabetic gastroparesis  Type 1 diabetes mellitus  Upper GI bleed likely from esophageal tear due to repetitive dry heaving and vomiting    Plan:     Patient will be admitted to the ICU, will start on the DKA protocol, insulin drip and follow-up on the chemistry every 4 hours until the anion gap  is down to normal and the bicarbonate is above 18, will consider transition to p.o. diet if the patient is cleared for p.o. from the GI standpoint  The patient reported upper GI bleed with vomiting blood, no risk factor for variceal bleed, no history of any gastric ulcers before patient however has been dry heaving for days and may have a Boerhaave's tear, will consult GI, consider EGD if deemed necessary, we will put him on Protonix twice daily  We will increase Reglan to 10 mg every 6 hours IV, can be transition to p.o. before every meal and nightly once he is able to take p.o.  Hemodynamically the patient is compensating at this point  Has leukocytosis likely stress related was no antibiotic indicated  Labs from the outlying facility reviewed and discussed with the physician, patient will be managed in the ICU until his acidosis has resolved      Time: Critical care 36 min    Naya Wolfe MD  [facility]   07/04/21  03:26 EDT    Dictated utilizing Dragon dictation

## 2021-07-04 NOTE — PROGRESS NOTES
[facility]     Mar/chart reviewed  Follow up DKA  Sleepy, awakens says he is not hungry doesn't think he can eat anything    Vital Sign Min/Max for last 24 hours  Temp  Min: 97.8 °F (36.6 °C)  Max: 98.3 °F (36.8 °C)   BP  Min: 108/61  Max: 168/96   Pulse  Min: 92  Max: 138   Resp  Min: 12  Max: 20   SpO2  Min: 96 %  Max: 100 %   No data recorded   Weight  Min: 58.1 kg (128 lb 1.4 oz)  Max: 63.8 kg (140 lb 10.5 oz)     Appears ill, sleepy  perrl, eomi, no icterus,  mmm, no jvd, trachea midline, neck supple,  chest cta bilaterally, no crackles, no wheezes,   Tachy, regular  soft, nt, nd +bs,  no c/c/ e  Skin warm, dry no rashes    Labs: 7/4: reviewed:  Bicarb 20.9  Na 140  k 3.8  Phos 0.9    DKA  Diabetic gastroparesis  Type 1 diabetes mellitus  Upper GI bleed likely from esophageal tear due to repetitive dry heaving and vomiting    Gap closed but not ready for po yet so will continue on current for now.

## 2021-07-04 NOTE — CONSULTS
"Adult Nutrition  Assessment/PES    Patient Name:  Morro Blancas  YOB: 1990  MRN: [id number]  Admit Date:  7/4/2021    Assessment Date:  7/4/2021    Comments:  Nutrition Consult from DKA protocol for diet education. Pt has been struggling with severe gastroparesis and followed by [facility].  May also have upper GI bleed. NPO, Still on the insulin drip.  Will follow clinical course, nutrition needs and offer diet education when pt is feeling better.    Reason for Assessment     Row Name 07/04/21 1211          Reason for Assessment    Reason For Assessment  physician consult;TF/PN     Diagnosis  endocrine conditions;metabolic state DKA, Upper GI bleed  Hx DM, severe gastroparesis, anxiety, depression, etoh abuse, marijuana use         Nutrition/Diet History     Row Name 07/04/21 1220          Nutrition/Diet History    Factors Affecting Nutritional Intake  vomiting;nausea         Anthropometrics     Row Name 07/04/21 1220 07/04/21 0703       Anthropometrics    Height  175.3 cm (69\")  175.3 cm (69.02\")    Weight  --  63.8 kg (140 lb 10.5 oz)       Admit Weight    Admit Weight  63.8 kg (140 lb 10.5 oz)  --       Ideal Body Weight (IBW)    Ideal Body Weight (IBW) (kg)  73.69  73.73    % Ideal Body Weight  --  86.53    % of Ideal Body Weight Assessment  80-90%: mild deficit 86% IBW  --       Body Mass Index (BMI)    BMI (kg/m2)  --  20.8    BMI Assessment  BMI 18.5-24.9: normal BMI 20.8  --        Labs/Tests/Procedures/Meds     Row Name 07/04/21 1221          Labs/Procedures/Meds    Lab Results Reviewed  reviewed, pertinent     Lab Results Comments  glu, phos        Diagnostic Tests/Procedures    Diagnostic Test/Procedure Reviewed  reviewed        Medications    Pertinent Medications Reviewed  reviewed, pertinent     Pertinent Medications Comments  insulin drip, reglan, zofran q 4 hrs, protonix, carafate         Physical Findings     Row Name 07/04/21 1222          Physical Findings    Overall " Physical Appearance  underweight     Gastrointestinal  nausea     Oral/Mouth Cavity  poor dentition;dental caries     Skin  other (see comments) intact         Nutrition Prescription Ordered     Row Name 07/04/21 1224          Nutrition Prescription PO    Current PO Diet  NPO;Sips/ice chips         Evaluation of Received Nutrient/Fluid Intake     Row Name 07/04/21 1224          Fluid Intake Evaluation    IV Fluid (mL)  3600         Problem/Interventions:  Problem 1     Row Name 07/04/21 1224          Nutrition Diagnoses Problem 1    Problem 1  Altered GI Function     Etiology (related to)  Medical Diagnosis     Endocrine  DM1     Gastrointestinal  Vomiting;Nausea;Gastroparesis     Metabolic/ICU  Ketoacidosis               Intervention Goal     Row Name 07/04/21 1225          Intervention Goal    General  Maintain nutrition;Improved nutrition related lab(s);Reduce/improve symptoms;Meet nutritional needs for age/condition;Provide information regarding MNT for treatment/condition;Disease management/therapy     PO  Initiate feeding;Tolerate PO     Weight  Maintain weight         Nutrition Intervention     Row Name 07/04/21 1225          Nutrition Intervention    RD/Tech Action  Follow Tx progress;Care plan reviewd;Await begin PO           Education/Evaluation     Row Name 07/04/21 1225          Education    Education  Education not appropriate at this time     Please explain  Other (comment);Defer until post ICU nausea        Monitor/Evaluation    Monitor  Skin status;Per protocol;I&O;Symptoms;PO intake;Pertinent labs;Weight           Electronically signed by:  Anna Terry RD  07/04/21 12:26 EDT

## 2021-07-05 LAB
ANION GAP SERPL CALCULATED.3IONS-SCNC: 11.4 MMOL/L (ref 5–15)
ANION GAP SERPL CALCULATED.3IONS-SCNC: 9.1 MMOL/L (ref 5–15)
BASOPHILS # BLD AUTO: 0.06 10*3/MM3 (ref 0–0.2)
BASOPHILS NFR BLD AUTO: 0.4 % (ref 0–1.5)
BUN SERPL-MCNC: 5 MG/DL (ref 6–20)
BUN SERPL-MCNC: 7 MG/DL (ref 6–20)
BUN/CREAT SERPL: 6.8 (ref 7–25)
BUN/CREAT SERPL: 9.7 (ref 7–25)
CALCIUM SPEC-SCNC: 7.7 MG/DL (ref 8.6–10.5)
CALCIUM SPEC-SCNC: 8 MG/DL (ref 8.6–10.5)
CHLORIDE SERPL-SCNC: 109 MMOL/L (ref 98–107)
CHLORIDE SERPL-SCNC: 110 MMOL/L (ref 98–107)
CO2 SERPL-SCNC: 18.6 MMOL/L (ref 22–29)
CO2 SERPL-SCNC: 22.9 MMOL/L (ref 22–29)
CREAT SERPL-MCNC: 0.72 MG/DL (ref 0.76–1.27)
CREAT SERPL-MCNC: 0.73 MG/DL (ref 0.76–1.27)
DEPRECATED RDW RBC AUTO: 42.7 FL (ref 37–54)
EOSINOPHIL # BLD AUTO: 0.01 10*3/MM3 (ref 0–0.4)
EOSINOPHIL NFR BLD AUTO: 0.1 % (ref 0.3–6.2)
ERYTHROCYTE [DISTWIDTH] IN BLOOD BY AUTOMATED COUNT: 13 % (ref 12.3–15.4)
GFR SERPL CREATININE-BSD FRML MDRD: 126 ML/MIN/1.73
GFR SERPL CREATININE-BSD FRML MDRD: 128 ML/MIN/1.73
GLUCOSE BLDC GLUCOMTR-MCNC: 102 MG/DL (ref 70–130)
GLUCOSE BLDC GLUCOMTR-MCNC: 107 MG/DL (ref 70–130)
GLUCOSE BLDC GLUCOMTR-MCNC: 110 MG/DL (ref 70–130)
GLUCOSE BLDC GLUCOMTR-MCNC: 119 MG/DL (ref 70–130)
GLUCOSE BLDC GLUCOMTR-MCNC: 155 MG/DL (ref 70–130)
GLUCOSE BLDC GLUCOMTR-MCNC: 162 MG/DL (ref 70–130)
GLUCOSE BLDC GLUCOMTR-MCNC: 198 MG/DL (ref 70–130)
GLUCOSE BLDC GLUCOMTR-MCNC: 81 MG/DL (ref 70–130)
GLUCOSE BLDC GLUCOMTR-MCNC: 88 MG/DL (ref 70–130)
GLUCOSE BLDC GLUCOMTR-MCNC: 90 MG/DL (ref 70–130)
GLUCOSE SERPL-MCNC: 113 MG/DL (ref 65–99)
GLUCOSE SERPL-MCNC: 71 MG/DL (ref 65–99)
HCT VFR BLD AUTO: 39.6 % (ref 37.5–51)
HGB BLD-MCNC: 13.4 G/DL (ref 13–17.7)
IMM GRANULOCYTES # BLD AUTO: 0.05 10*3/MM3 (ref 0–0.05)
IMM GRANULOCYTES NFR BLD AUTO: 0.3 % (ref 0–0.5)
LYMPHOCYTES # BLD AUTO: 2.54 10*3/MM3 (ref 0.7–3.1)
LYMPHOCYTES NFR BLD AUTO: 17.5 % (ref 19.6–45.3)
MAGNESIUM SERPL-MCNC: 2.4 MG/DL (ref 1.6–2.6)
MAGNESIUM SERPL-MCNC: 2.6 MG/DL (ref 1.6–2.6)
MAGNESIUM SERPL-MCNC: 3.1 MG/DL (ref 1.6–2.6)
MCH RBC QN AUTO: 30.5 PG (ref 26.6–33)
MCHC RBC AUTO-ENTMCNC: 33.8 G/DL (ref 31.5–35.7)
MCV RBC AUTO: 90.2 FL (ref 79–97)
MONOCYTES # BLD AUTO: 1.18 10*3/MM3 (ref 0.1–0.9)
MONOCYTES NFR BLD AUTO: 8.1 % (ref 5–12)
NEUTROPHILS NFR BLD AUTO: 10.66 10*3/MM3 (ref 1.7–7)
NEUTROPHILS NFR BLD AUTO: 73.6 % (ref 42.7–76)
NRBC BLD AUTO-RTO: 0 /100 WBC (ref 0–0.2)
PHOSPHATE SERPL-MCNC: 1.8 MG/DL (ref 2.5–4.5)
PHOSPHATE SERPL-MCNC: 2 MG/DL (ref 2.5–4.5)
PHOSPHATE SERPL-MCNC: 2.9 MG/DL (ref 2.5–4.5)
PLATELET # BLD AUTO: 285 10*3/MM3 (ref 140–450)
PMV BLD AUTO: 10.3 FL (ref 6–12)
POTASSIUM SERPL-SCNC: 3.3 MMOL/L (ref 3.5–5.2)
POTASSIUM SERPL-SCNC: 3.8 MMOL/L (ref 3.5–5.2)
RBC # BLD AUTO: 4.39 10*6/MM3 (ref 4.14–5.8)
SODIUM SERPL-SCNC: 140 MMOL/L (ref 136–145)
SODIUM SERPL-SCNC: 141 MMOL/L (ref 136–145)
WBC # BLD AUTO: 14.5 10*3/MM3 (ref 3.4–10.8)

## 2021-07-05 PROCEDURE — 25010000002 METOCLOPRAMIDE PER 10 MG: Performed by: INTERNAL MEDICINE

## 2021-07-05 PROCEDURE — 25010000002 ONDANSETRON PER 1 MG: Performed by: INTERNAL MEDICINE

## 2021-07-05 PROCEDURE — 83735 ASSAY OF MAGNESIUM: CPT | Performed by: INTERNAL MEDICINE

## 2021-07-05 PROCEDURE — 84100 ASSAY OF PHOSPHORUS: CPT | Performed by: INTERNAL MEDICINE

## 2021-07-05 PROCEDURE — 80048 BASIC METABOLIC PNL TOTAL CA: CPT | Performed by: INTERNAL MEDICINE

## 2021-07-05 PROCEDURE — 82962 GLUCOSE BLOOD TEST: CPT

## 2021-07-05 PROCEDURE — 85025 COMPLETE CBC W/AUTO DIFF WBC: CPT | Performed by: INTERNAL MEDICINE

## 2021-07-05 PROCEDURE — 99232 SBSQ HOSP IP/OBS MODERATE 35: CPT | Performed by: INTERNAL MEDICINE

## 2021-07-05 PROCEDURE — 94799 UNLISTED PULMONARY SVC/PX: CPT

## 2021-07-05 RX ADMIN — PANTOPRAZOLE SODIUM 40 MG: 40 INJECTION, POWDER, FOR SOLUTION INTRAVENOUS at 21:51

## 2021-07-05 RX ADMIN — SODIUM CHLORIDE, PRESERVATIVE FREE 3 ML: 5 INJECTION INTRAVENOUS at 21:51

## 2021-07-05 RX ADMIN — PANTOPRAZOLE SODIUM 40 MG: 40 INJECTION, POWDER, FOR SOLUTION INTRAVENOUS at 09:20

## 2021-07-05 RX ADMIN — SUCRALFATE 1 G: 1 TABLET ORAL at 06:32

## 2021-07-05 RX ADMIN — METOCLOPRAMIDE HYDROCHLORIDE 10 MG: 5 INJECTION INTRAMUSCULAR; INTRAVENOUS at 17:24

## 2021-07-05 RX ADMIN — SUCRALFATE 1 G: 1 TABLET ORAL at 11:18

## 2021-07-05 RX ADMIN — DULOXETINE HYDROCHLORIDE 30 MG: 30 CAPSULE, DELAYED RELEASE ORAL at 09:20

## 2021-07-05 RX ADMIN — METOCLOPRAMIDE HYDROCHLORIDE 10 MG: 5 INJECTION INTRAMUSCULAR; INTRAVENOUS at 21:51

## 2021-07-05 RX ADMIN — ONDANSETRON 4 MG: 2 INJECTION INTRAMUSCULAR; INTRAVENOUS at 09:20

## 2021-07-05 RX ADMIN — SODIUM CHLORIDE, PRESERVATIVE FREE 3 ML: 5 INJECTION INTRAVENOUS at 09:58

## 2021-07-05 RX ADMIN — METOCLOPRAMIDE HYDROCHLORIDE 10 MG: 5 INJECTION INTRAMUSCULAR; INTRAVENOUS at 09:20

## 2021-07-05 RX ADMIN — POTASSIUM CHLORIDE 40 MEQ: 750 TABLET, EXTENDED RELEASE ORAL at 11:18

## 2021-07-05 RX ADMIN — ONDANSETRON 4 MG: 2 INJECTION INTRAMUSCULAR; INTRAVENOUS at 02:17

## 2021-07-05 RX ADMIN — SUCRALFATE 1 G: 1 TABLET ORAL at 21:51

## 2021-07-05 RX ADMIN — METOCLOPRAMIDE HYDROCHLORIDE 10 MG: 5 INJECTION INTRAMUSCULAR; INTRAVENOUS at 04:02

## 2021-07-05 RX ADMIN — ONDANSETRON 4 MG: 2 INJECTION INTRAMUSCULAR; INTRAVENOUS at 06:32

## 2021-07-05 RX ADMIN — Medication 2 PACKET: at 12:35

## 2021-07-05 NOTE — PROGRESS NOTES
[facility]      Mar/chart reviewed  Follow up DKA  Less nausea     Vital Sign Min/Max for last 24 hours  Temp  Min: 98 °F (36.7 °C)  Max: 98.9 °F (37.2 °C)   BP  Min: 115/74  Max: 159/92   Pulse  Min: 86  Max: 121   No data recorded   SpO2  Min: 93 %  Max: 100 %   No data recorded   Weight  Min: 64.1 kg (141 lb 5 oz)  Max: 64.1 kg (141 lb 5 oz)     Appears ill, sleepy  perrl, eomi, no icterus,  mmm, no jvd, trachea midline, neck supple,  chest cta bilaterally, no crackles, no wheezes,   Tachy, regular  soft, nt, nd +bs,  no c/c/ e  Skin warm, dry no rashes    Labs: 7/5: reviewed:  Wbc 14.5  hgb 13.4  plts 285      DKA --resolved, back on home insulin pump  Diabetic gastroparesis  Type 1 diabetes mellitus  Upper GI bleed likely from esophageal tear due to repetitive dry heaving and vomiting    Transfer out of unit. Ensure adequate po intake, hopefully d/c home tomorrow.

## 2021-07-05 NOTE — PLAN OF CARE
"Goal Outcome Evaluation:  Plan of Care Reviewed With: patient        Progress: improving  Outcome Summary: Pt has slept during night stating \" I feel better than I did yesturday with less nausea and no vomiting\". Pt has not produced any emisis today with a stable blood sugar no longer requiring an insulin drip. BG are Q2H currently until insulin pump sensor the patient has can be replaced. Pt BG in low 80's this morning 240 ml of apple juice given which increased pt BG.  "

## 2021-07-05 NOTE — PAYOR COMM NOTE
"Dorothy Bautista (30 y.o. Male)                      ATTENTION;    INITIAL CLINICALS FOR REVIEW, AUTH PENDING [id number]                     REPLY TO UR DEPTSOPHIA LPN  [phone] OR CALL            Date of Birth Social Security Number Address Home Phone MRN    1990  [address] [facility] [id number]    [employer] Mar[employer] Status          [employer] Significant Other       Admission Date Admission Type Admitting Provider Attending Provider Department, Room/Bed    21 Urgent RayRobson MD Saad, Naya ROYAL MD [facility] INTENSIVE CARE, I380/    Discharge Date Discharge Disposition Discharge Destination                       Attending Provider: Naya Wolfe MD    Allergies: No Known Allergies    Isolation: None   Infection: None   Code Status: Prior    Ht: 175.3 cm (69\")   Wt: 64.1 kg (141 lb 5 oz)    Admission Cmt: None   Principal Problem: None                Active Insurance as of 2021     Primary Coverage     Payor Plan Insurance Group Employer/Plan Group    [place] [id number]     Payor Plan Address Payor Plan Phone Number Payor Plan Fax Number Effective Dates    [employer] [address] [address] [phone]  2021 - None Entered    [facility] [id number]       Subscriber Name Subscriber Birth Date Member ID       DOROTHY BAUTISTA 1990 [id number]                 Emergency Contacts      (Rel.) Home Phone Work Phone Mobile Phone    Mahsa Lee (Significant Other) -- -- [phone]    Julienne Bautista (Mother) [phone] -- [phone]               History & Physical      Naya Wolfe MD at 21 0326            History and Physical    Patient Name: Dorothy Bautista  Age/Sex: 30 y.o. male  : 1990  MRN: [id number]    Date of Admission: 2021  Date of Encounter Visit: 21  Encounter Provider: Naya Wolfe MD  Place of Service: [facility]   Patient Care Team:  Gale Banegas " YUDELKA PEDERSON as PCP - General (Family Medicine)  Ysabel Javier MD as Consulting Physician (Endocrinology)      Subjective:     Chief Complaint: Refractory nausea vomiting    History of Present Illness:  Morro Blancas is a 30 y.o. male with history of type 1 diabetes mellitus with severe gastroparesis that has resulted and several prior admission with uncontrolled diabetes and intractable nausea and vomiting.  Has been having problem for the last 5 to 7 days, eventually ended up in the emergency room at [facility] and requested to be admitted to [facility] where he usually gets most of his care.  Patient has multiple prior admission and his discharge summary from this admission were reviewed.  He was followed by GI and was supposed to be set up with the motility clinic sometime this week  Patient denies any fever or chills  He does have nausea and vomiting and he noticed some blood in his vomitus but it was not large quantities  He had a bowel movement 2 hours prior to transfer and it was not bloody  Patient was supposed to be managed without the insulin drip however on his repeat chemistry his anion gap was still elevated and his bicarbonate was trending down so he was upgraded from a telemetry transfer and was admitted to the ICU instead for management of DKA.  He had 7 units of insulin IV push and has not been started on the insulin drip yet but he did receive 4000 cc of crystalloids  Patient is already feeling slightly better, his breathing is no longer heavy or deep in his acidosis was not that severe.  No state of any peptic ulcer disease or upper GI bleed in the past, no significant liver disease or alcoholism.  No altered mental status  He did have similar previous episode  His insulin pump is currently turned off but he was getting his sliding scale insulin until 2 hours prior to presentation to the outlying facility    Pulmonary Functions Testing Results:    No results found for: FEV1, FVC,  TKL1ILB, TLC, DLCO    Review of Systems:   Review of Systems   Constitutional: Negative.    HENT: Negative.    Eyes: Negative.    Respiratory: Negative.    Cardiovascular: Negative.    Gastrointestinal: Positive for abdominal pain, nausea and vomiting. Negative for abdominal distention, anal bleeding, blood in stool, constipation, diarrhea and rectal pain.   Endocrine: Negative.    Genitourinary: Negative.    Musculoskeletal: Negative.    Skin: Negative.    Allergic/Immunologic: Negative.    Neurological: Negative.    Hematological: Negative.    Psychiatric/Behavioral: Negative.        Past Medical History:  Past Medical History:   Diagnosis Date   • Anxiety    • Anxiety and depression    • Colon polyp 8/30/2017    1   • Depression    • Diabetes type 1, uncontrolled (CMS/HCC)     diagnosed at age 17   • Dyspepsia    • Family hx of colon cancer    • Gastroparesis    • GERD (gastroesophageal reflux disease)        Past Surgical History:   Procedure Laterality Date   • COLONOSCOPY N/A 8/30/2017    One 3 mm polyp in the sigmoid colon, Granularity in the terminal ileum   • ENDOSCOPY N/A 4/15/2021    Procedure: ESOPHAGOGASTRODUODENOSCOPY with biopsies;  Surgeon: Da Fleming MD;  Location: [facility] ENDOSCOPY;  Service: Gastroenterology;  Laterality: N/A;  PRE:   nausea, vomiting, hematemesis  POST:  reflux esophagitis, erosive gastropathy, scalloped mucosa in duodenum ? celiac       Home Medications:   Medications Prior to Admission   Medication Sig Dispense Refill Last Dose   • Acetone, Urine, Test (Ketone Test) strip 1 strip by In Vitro route As Needed (nausea, vomiting with elevated glucose levels). 50 strip 3    • Continuous Blood Gluc Sensor (Dexcom G6 Sensor)       • DULoxetine (CYMBALTA) 30 MG capsule Take 1 capsule by mouth Daily. 90 capsule 3    • Glucagon, rDNA, (Glucagon Emergency) 1 MG kit As Needed.      • Insulin Disposable Pump (OmniPod Dash 5 Pack Pods) misc 0.85 Units. Basal rate recently decreased  1.5 u/hr to 0.85 u/hr   1 unit per 12 carb  Correction factor 1unit=45mg/dL  Goal 150       • metoclopramide (Reglan) 5 MG tablet Take 1 tablet by mouth 4 (Four) Times a Day Before Meals & at Bedtime As Needed (nausea/vomiting/gatroparesis). 120 tablet 3    • multivitamin with minerals tablet tablet Take 1 tablet by mouth Daily. Chewable gummie      • ondansetron ODT (ZOFRAN-ODT) 4 MG disintegrating tablet Take 4 mg by mouth Every 8 (Eight) Hours As Needed.      • pantoprazole (PROTONIX) 40 MG EC tablet Take 1 tablet by mouth 2 (Two) Times a Day Before Meals for 180 days. 60 tablet 5    • promethazine (PHENERGAN) 25 MG suppository Insert 1 suppository into the rectum Every 6 (Six) Hours As Needed for Nausea or Vomiting. 20 suppository 0    • rOPINIRole (REQUIP) 0.5 MG tablet Take 0.5 mg by mouth Daily As Needed.      • sucralfate (Carafate) 1 g tablet Take 1 tablet by mouth 2 (two) times a day for 120 days. 60 tablet 3        Inpatient Medications:  Scheduled Meds:DULoxetine, 30 mg, Oral, Daily  metoclopramide, 10 mg, Intravenous, Q6H  pantoprazole, 40 mg, Intravenous, Q12H  sodium chloride, 2,000 mL, Intravenous, Once  sodium chloride, 3 mL, Intravenous, Q12H  sucralfate, 1 g, Oral, 4x Daily AC & at Bedtime      Continuous Infusions:dextrose 5 % and sodium chloride 0.45 %, 150 mL/hr  dextrose 5 % and sodium chloride 0.45 % with KCl 20 mEq/L, 150 mL/hr  dextrose 5 % and sodium chloride 0.45 % with KCl 40 mEq/L, 150 mL/hr  dextrose 5 % and sodium chloride 0.9 %, 150 mL/hr  dextrose 5 % and sodium chloride 0.9 % with KCl 20 mEq, 150 mL/hr  dextrose 5% and sodium chloride 0.9% with KCl 40 mEq/L, 150 mL/hr  insulin, 0-20 Units/hr  custom IV KCl infusion builder, 250 mL/hr  sodium chloride, 250 mL/hr  sodium chloride 0.45 % with KCl 20 mEq, 250 mL/hr  sodium chloride, 250 mL/hr  sodium chloride, 10 mL/hr  sodium chloride 0.9 % with KCl 20 mEq, 250 mL/hr  sodium chloride 0.9 % with KCl 40 mEq/L, 250 mL/hr      PRN Meds:.•   dextrose  •  dextrose 5 % and sodium chloride 0.45 %  •  dextrose 5 % and sodium chloride 0.45 % with KCl 20 mEq/L  •  dextrose 5 % and sodium chloride 0.45 % with KCl 40 mEq/L  •  dextrose 5 % and sodium chloride 0.9 %  •  dextrose 5 % and sodium chloride 0.9 % with KCl 20 mEq  •  dextrose 5% and sodium chloride 0.9% with KCl 40 mEq/L  •  magnesium sulfate **OR** magnesium sulfate **OR** magnesium sulfate  •  potassium chloride **OR** potassium chloride **OR** potassium chloride  •  potassium phosphate infusion greater than 15 mMoles **OR** potassium phosphate infusion greater than 15 mMoles **OR** potassium phosphate **OR** sodium phosphate IVPB **OR** sodium phosphate IVPB  •  promethazine  •  custom IV KCl infusion builder  •  sodium chloride  •  sodium chloride 0.45 % with KCl 20 mEq  •  sodium chloride  •  sodium chloride  •  sodium chloride  •  sodium chloride  •  sodium chloride 0.9 % with KCl 20 mEq  •  sodium chloride 0.9 % with KCl 40 mEq/L    Allergies:  No Known Allergies    Past Social History:  Social History     Socioeconomic History   • Marital status: Significant Other     Spouse name: Not on file   • Number of children: Not on file   • Years of education: Not on file   • Highest education level: Not on file   Tobacco Use   • Smoking status: Current Every Day Smoker     Packs/day: 0.50     Years: 15.00     Pack years: 7.50     Types: Cigarettes     Start date: 2005   • Smokeless tobacco: Never Used   Vaping Use   • Vaping Use: Never used   Substance and Sexual Activity   • Alcohol use: Yes     Comment: 3-4 beers on weekend   • Drug use: Yes     Frequency: 7.0 times per week     Types: Marijuana     Comment: DAILY   • Sexual activity: Defer       Past Family History:  Family History   Problem Relation Age of Onset   • Cancer Mother    • Hypertension Father    • Depression Paternal Uncle    • Hypertension Maternal Grandmother    • Irritable bowel syndrome Maternal Grandmother    • Colon cancer  Maternal Grandmother    • Hypertension Maternal Grandfather    • Alcohol abuse Maternal Grandfather    • Hypertension Paternal Grandmother    • Cancer Paternal Grandmother    • COPD Paternal Grandmother    • Depression Paternal Grandmother    • Heart disease Paternal Grandfather    • Hypertension Paternal Grandfather            Objective:   Temp:  [97.8 °F (36.6 °C)-98.3 °F (36.8 °C)] 98.3 °F (36.8 °C)  Heart Rate:  [] 104  Resp:  [12-20] 12  BP: (108-160)/(60-90) 160/89   SpO2:  [96 %-100 %] 98 %  on    Device (Oxygen Therapy): room air  No intake or output data in the 24 hours ending 07/04/21 0326  Body mass index is 20.77 kg/m².      07/04/21  0323   Weight: 63.8 kg (140 lb 10.5 oz)     Weight change:     Physical Exam:   Physical Exam   General:   Sickly looking gentleman but awake and appropriate, oriented x4                   Head:    Normocephalic, atraumatic.   Eyes:          Conjunctivae and sclerae normal, no icterus, PERRLA   Throat:   No oral lesions, no thrush, dry mucous membranes.    Neck:   Supple, trachea midline.   Lungs:     Normal chest on inspection, CTAB, no wheezes. No rhonchi. No crackles. Respirations regular, even and unlabored.     Heart:    Regular rhythm and normal rate.  No murmurs, gallops, or rubs noted.   Abdomen:     Soft, tender to deep palpation with no rebound, non-distended, positive bowel sounds.    Extremities:   No clubbing, cyanosis, or edema.     Pulses:   Pulses palpable and equal bilaterally.    Skin:   No bleeding or rash.   Neuro:   Non-focal.  Moves all extremities well.    Psychiatric:   Normal mood and affect.     Lab Review:   Results from last 7 days   Lab Units 07/04/21  0022 07/03/21  2122 07/03/21  1741   SODIUM mmol/L 138 138 137   POTASSIUM mmol/L 4.2 4.4 4.4   CHLORIDE mmol/L 107 100 97*   CO2 mmol/L 13.7* 17.5* 17.9*   BUN mg/dL 25* 29* 29*   CREATININE mg/dL 0.87 0.96 1.00   GLUCOSE mg/dL 247* 296* 225*   CALCIUM mg/dL 7.5* 8.6 10.3   AST (SGOT) U/L  12  --  16   ALT (SGPT) U/L 10  --  16   ALBUMIN g/dL 3.60  --  5.10         Results from last 7 days   Lab Units 07/04/21  0022 07/03/21  1741   WBC 10*3/mm3 13.69* 14.93*   HEMOGLOBIN g/dL 11.6* 15.0   HEMATOCRIT % 35.0* 42.2   PLATELETS 10*3/mm3 253 355   MCV fL 91.6 85.3   MCH pg 30.4 30.3   MCHC g/dL 33.1 35.5   RDW % 12.7 12.3   RDW-SD fl 42.5 37.8   MPV fL 10.9 11.0   NEUTROPHIL % % 90.3* 87.6*   LYMPHOCYTE % % 7.3* 9.8*   MONOCYTES % % 2.0* 2.0*   EOSINOPHIL % % 0.0* 0.0*   BASOPHIL % % 0.1 0.3   IMM GRAN % % 0.3 0.3   NEUTROS ABS 10*3/mm3 12.36* 13.08*   LYMPHS ABS 10*3/mm3 1.00 1.47   MONOS ABS 10*3/mm3 0.27 0.30   EOS ABS 10*3/mm3 0.00 0.00   BASOS ABS 10*3/mm3 0.02 0.04   IMMATURE GRANS (ABS) 10*3/mm3 0.04 0.04   NRBC /100 WBC 0.0 0.0                   Invalid input(s): LDLCALC          Results from last 7 days   Lab Units 07/03/21  2218   PH, ARTERIAL pH units 7.321*   PCO2, ARTERIAL mm Hg 35.9   PO2 ART mm Hg 95.1   HCO3 ART mmol/L 18.1*             Results from last 7 days   Lab Units 07/03/21  1741   LIPASE U/L 16         Results from last 7 days   Lab Units 07/03/21  1917   NITRITE UA  Negative   WBC UA /HPF 0-2*   BACTERIA UA /HPF None Seen   SQUAM EPITHEL UA /HPF 0-2               Imaging:  Imaging Results (Most Recent)     None          I personally viewed and interpreted the patient's imaging studies.    Assessment:     DKA  Diabetic gastroparesis  Type 1 diabetes mellitus  Upper GI bleed likely from esophageal tear due to repetitive dry heaving and vomiting    Plan:     Patient will be admitted to the ICU, will start on the DKA protocol, insulin drip and follow-up on the chemistry every 4 hours until the anion gap is down to normal and the bicarbonate is above 18, will consider transition to p.o. diet if the patient is cleared for p.o. from the GI standpoint  The patient reported upper GI bleed with vomiting blood, no risk factor for variceal bleed, no history of any gastric ulcers before  patient however has been dry heaving for days and may have a Boerhaave's tear, will consult GI, consider EGD if deemed necessary, we will put him on Protonix twice daily  We will increase Reglan to 10 mg every 6 hours IV, can be transition to p.o. before every meal and nightly once he is able to take p.o.  Hemodynamically the patient is compensating at this point  Has leukocytosis likely stress related was no antibiotic indicated  Labs from the outlying facility reviewed and discussed with the physician, patient will be managed in the ICU until his acidosis has resolved      Time: Critical care 36 min    Naya Wolfe MD  [facility]   07/04/21  03:26 EDT    Dictated utilizing Dragon dictation      Electronically signed by Naya Wofle MD at 07/04/21 0332       Vital Signs (last day)     Date/Time   Temp   Temp src   Pulse   Resp   BP   Patient Position   SpO2    07/05/21 1100   --   --   --   --   142/85   --   --    07/05/21 1045   --   --   89   --   --   --   98    07/05/21 1030   --   --   87   --   129/95   --   98    07/05/21 1015   --   --   90   --   --   --   98    07/05/21 1000   --   --   102   --   143/99   --   99    07/05/21 0945   --   --   106   --   --   --   99    07/05/21 0930   --   --   105   --   (!) 144/105   --   99    07/05/21 0915   --   --   101   --   --   --   97    07/05/21 0900   --   --   112   --   125/95   --   99    07/05/21 0845   --   --   85   --   --   --   98    07/05/21 0830   --   --   93   --   133/88   --   97    07/05/21 0827   --   --   --   --   --   --   95    07/05/21 0815   --   --   87   --   --   --   98    07/05/21 0800   --   --   101   --   128/93   --   97    07/05/21 0745   --   --   85   --   --   --   98    07/05/21 0730   --   --   84   --   134/91   --   97    07/05/21 0715   --   --   (!) 121   --   --   --   98    07/05/21 0700   --   --   --   --   134/92   --   --    07/05/21 0600   --   --   89   --   130/86   --   98    07/05/21  0500   --   --   93   --   126/84   --   97    07/05/21 0400   98 (36.7)   Oral   108   --   138/95   --   99    07/05/21 0300   --   --   106   --   139/96   --   98    07/05/21 0200   --   --   92   --   124/80   --   97    07/05/21 0100   --   --   98   --   127/87   --   97    07/05/21 0000   --   --   96   --   143/91   --   98    07/04/21 2300   98.9 (37.2)   Oral   95   --   129/79   --   97    07/04/21 2200   --   --   95   --   132/83   --   97    07/04/21 2100   --   --   97   --   137/90   --   98    07/04/21 2000   --   --   92   --   131/80   --   98    07/04/21 1935   98.4 (36.9)   Oral   --   --   --   --   --    07/04/21 1930   --   --   98   --   125/92   --   99    07/04/21 1900   --   --   95   --   144/91   --   100    07/04/21 1830   --   --   98   --   145/96   --   99    07/04/21 1730   --   --   105   --   142/88   --   98    07/04/21 1700   --   --   (!) 121   --   128/85   --   99    07/04/21 1630   --   --   98   --   154/95   --   99    07/04/21 1600   --   --   97   --   146/91   --   97    07/04/21 1530   --   --   95   --   133/86   --   93    07/04/21 1500   --   --   96   --   135/81   --   97    07/04/21 1430   --   --   97   --   141/87   --   99    07/04/21 1400   --   --   92   --   141/83   --   99    07/04/21 1330   --   --   103   --   136/83   --   97    07/04/21 1300   --   --   98   --   136/90   --   98    07/04/21 1230   --   --   86   --   140/94   --   99    07/04/21 1200   --   --   96   --   151/90   --   98    07/04/21 1130   --   --   96   --   148/92   --   99    07/04/21 1116   --   --   98   --   130/83   --   99    07/04/21 1101   --   --   106   --   (!) 145/103   --   99    07/04/21 1046   --   --   94   --   139/89   --   98    07/04/21 1031   --   --   92   --   159/92   --   100    07/04/21 1016   --   --   99   --   127/77   --   98    07/04/21 1001   --   --   96   --   140/94   --   98    07/04/21 0946   --   --   98   --   131/87   --   98    07/04/21  0931   --   --   97   --   130/84   --   98    07/04/21 0916   --   --   101   --   131/84   --   98    07/04/21 0901   --   --   101   --   115/74   --   97    07/04/21 0846   --   --   105   --   123/79   --   98    07/04/21 0831   --   --   105   --   141/89   --   99    07/04/21 0816   --   --   105   --   135/87   --   98    07/04/21 0801   --   --   97   --   154/88   --   99    07/04/21 0746   --   --   93   --   137/85   --   98    07/04/21 0731   --   --   96   --   145/87   --   98    07/04/21 0716   --   --   120   --   168/96   --   98    07/04/21 0703   98.3 (36.8)   Oral   120   12   --   --   --    07/04/21 0700   --   --   116   --   --   --   97    07/04/21 0616   --   --   116   --   149/95   --   97    07/04/21 0600   --   --   114   --   --   --   99    07/04/21 0500   --   --   (!) 138   --   164/84   --   97    07/04/21 0400   --   --   117   --   --   --   98    07/04/21 0323   98.3 (36.8)   Oral   104   12   160/89   --   98              Oxygen Therapy (last day)     Date/Time   SpO2   Device (Oxygen Therapy)   Flow (L/min)   Oxygen Concentration (%)   ETCO2 (mmHg)    07/05/21 1045   98   --   --   --   --    07/05/21 1030   98   --   --   --   --    07/05/21 1015   98   --   --   --   --    07/05/21 1000   99   --   --   --   --    07/05/21 0945   99   --   --   --   --    07/05/21 0930   99   --   --   --   --    07/05/21 0915   97   --   --   --   --    07/05/21 0900   99   --   --   --   --    07/05/21 0845 98   --   --   --   --    07/05/21 0830 97   --   --   --   --    07/05/21 0827   95   room air   --   --   --    07/05/21 0815 98   --   --   --   --    07/05/21 0800 97   --   --   --   --    07/05/21 0745 98   --   --   --   --    07/05/21 0730 97   --   --   --   --    07/05/21 0715 98   --   --   --   --    07/05/21 0600   98   --   --   --   --    07/05/21 0500   97   --   --   --   --    07/05/21 0400   99   --   --   --   --    07/05/21 0300   98   --   --   --    --    07/05/21 0200   97   --   --   --   --    07/05/21 0100   97   --   --   --   --    07/05/21 0000   98   --   --   --   --    07/04/21 2300   97   --   --   --   --    07/04/21 2200   97   --   --   --   --    07/04/21 2100   98   --   --   --   --    07/04/21 2000   98   --   --   --   --    07/04/21 1930   99   --   --   --   --    07/04/21 1900   100   --   --   --   --    07/04/21 1830   99   --   --   --   --    07/04/21 1730   98   --   --   --   --    07/04/21 1700   99   --   --   --   --    07/04/21 1630   99   --   --   --   --    07/04/21 1600   97   --   --   --   --    07/04/21 1530   93   --   --   --   --    07/04/21 1500   97   --   --   --   --    07/04/21 1430   99   --   --   --   --    07/04/21 1400   99   --   --   --   --    07/04/21 1330   97   --   --   --   --    07/04/21 1300   98   --   --   --   --    07/04/21 1230   99   --   --   --   --    07/04/21 1200   98   --   --   --   --    07/04/21 1130   99   --   --   --   --    07/04/21 1116   99   --   --   --   --    07/04/21 1101   99   --   --   --   --    07/04/21 1046   98   --   --   --   --    07/04/21 1031   100   --   --   --   --    07/04/21 1016   98   --   --   --   --    07/04/21 1001   98   --   --   --   --    07/04/21 0946   98   --   --   --   --    07/04/21 0931   98   --   --   --   --    07/04/21 0916   98   --   --   --   --    07/04/21 0901 97   --   --   --   --    07/04/21 0846 98   --   --   --   --    07/04/21 0831 99   --   --   --   --    07/04/21 0816 98   --   --   --   --    07/04/21 0801 99   --   --   --   --    07/04/21 0800   --   room air   --   --   --    07/04/21 0746 98   --   --   --   --    07/04/21 0731 98   --   --   --   --    07/04/21 0716 98   --   --   --   --    07/04/21 0700 97   --   --   --   --    07/04/21 0616 97   --   --   --   --    07/04/21 0600   99   --   --   --   --    07/04/21 0500   97   --   --   --   --    07/04/21 0400   98   --   --   --   --     07/04/21 0323   98   --   --   --   --              Lines, Drains & Airways    Active LDAs     Name:   Placement date:   Placement time:   Site:   Days:    Peripheral IV 07/03/21 1834 Left Antecubital   07/03/21 1834    Antecubital   1    Peripheral IV 07/04/21 0706 Anterior;Left Forearm   07/04/21    0706    Forearm   1         Inactive LDAs     None                  Facility-Administered Medications as of 7/5/2021   Medication Dose Route Frequency Provider Last Rate Last Admin   • calcium gluconate 1g/50ml 0.675% NaCl IV SOLN  1 g Intravenous PRN Yayo Desai MD        And   • calcium gluconate 6 g in sodium chloride 0.9 % 500 mL IVPB  6 g Intravenous PRN Yayo Desai MD       • dextrose (D50W) 25 g/ 50mL Intravenous Solution 12.5 g  12.5 g Intravenous PRN Naya Wolfe MD       • DULoxetine (CYMBALTA) DR capsule 30 mg  30 mg Oral Daily Naya Wolfe MD   30 mg at 07/05/21 0920   • [COMPLETED] famotidine (PEPCID) injection 20 mg  20 mg Intravenous Once Gretta Acosta MD   20 mg at 07/03/21 1910   • [COMPLETED] haloperidol lactate (HALDOL) injection 2 mg  2 mg Intravenous Once Gretta Acosta MD   2 mg at 07/03/21 1910   • [COMPLETED] insulin regular (humuLIN R,novoLIN R) injection 7 Units  7 Units Intravenous Once Gretta Acosta MD   7 Units at 07/03/21 2120   • [COMPLETED] LORazepam (ATIVAN) injection 1 mg  1 mg Intravenous Once Gretta Acosta MD   1 mg at 07/03/21 1910   • [COMPLETED] LORazepam (ATIVAN) injection 1 mg  1 mg Intravenous Once Lena Aguayo MD   1 mg at 07/04/21 0819   • Magnesium Sulfate 2 gram Bolus, followed by 8 gram infusion (total Mg dose 10 grams)- Mg less than or equal to 1mg/dL  2 g Intravenous PRN Naya Wolfe MD        Or   • Magnesium Sulfate 2 gram / 50mL Infusion (GIVE X 3 BAGS TO EQUAL 6GM TOTAL DOSE) - Mg 1.1 - 1.5 mg/dl  2 g Intravenous PRN Naya Wolfe MD        Or   • Magnesium Sulfate 4 gram infusion- Mg 1.6-1.9 mg/dL  4 g Intravenous  PRN Naya Wolfe MD 25 mL/hr at 07/04/21 2130 4 g at 07/04/21 2130   • metoclopramide (REGLAN) injection 10 mg  10 mg Intravenous Q6H Naya Wolfe MD   10 mg at 07/05/21 0920   • [COMPLETED] ondansetron (ZOFRAN) injection 4 mg  4 mg Intravenous Once Gretta Acosta MD   4 mg at 07/03/21 1910   • ondansetron (ZOFRAN) injection 4 mg  4 mg Intravenous Q4H Lena Aguayo MD   4 mg at 07/05/21 0920   • pantoprazole (PROTONIX) injection 40 mg  40 mg Intravenous Q12H Naya Wolfe MD   40 mg at 07/05/21 0920   • potassium & sodium phosphates (PHOS-NAK) 280-160-250 MG packet - for Phosphorus less than 1.25 mg/dL  2 packet Oral Q6H PRN Yayo Desai MD        Or   • potassium & sodium phosphates (PHOS-NAK) 280-160-250 MG packet - for Phosphorus 1.25 - 2.5 mg/dL  2 packet Oral Q6H PRN Yayo Desai MD   2 packet at 07/05/21 1235   • potassium chloride (K-DUR,KLOR-CON) ER tablet 40 mEq  40 mEq Oral PRN Naya Wolfe MD        Or   • potassium chloride (KLOR-CON) packet 40 mEq  40 mEq Oral PRN Naya Wolfe MD        Or   • potassium chloride 10 mEq in 100 mL IVPB  10 mEq Intravenous Q1H PRN Naya Wolfe MD       • potassium chloride (K-DUR,KLOR-CON) ER tablet 40 mEq  40 mEq Oral PRN Yayo Desai MD   40 mEq at 07/05/21 1118    Or   • potassium chloride (KLOR-CON) packet 40 mEq  40 mEq Oral PRN Yayo eDsai MD        Or   • potassium chloride 10 mEq in 100 mL IVPB  10 mEq Intravenous Q1H PRN Yayo Desai MD       • potassium phosphate 45 mmol in sodium chloride 0.9 % 500 mL infusion  45 mmol Intravenous PRN Naya Wolfe MD 62.5 mL/hr at 07/04/21 2210 45 mmol at 07/04/21 2210    Or   • potassium phosphate 30 mmol in sodium chloride 0.9 % 250 mL infusion  30 mmol Intravenous PRN Naya Wolfe MD        Or   • potassium phosphate 15 mmol in sodium chloride 0.9 % 100 mL infusion  15 mmol Intravenous PRN Naya Wolfe MD        Or   • sodium phosphates 40 mmol in sodium chloride 0.9 %  500 mL IVPB  40 mmol Intravenous PRN Naya Wolfe MD        Or   • sodium phosphates 20 mmol in sodium chloride 0.9 % 250 mL IVPB  20 mmol Intravenous PRN Naya Wolfe MD       • promethazine (PHENERGAN) suppository 25 mg  25 mg Rectal Q6H PRN Naya Wolfe MD   25 mg at 07/04/21 0627   • [COMPLETED] sodium chloride 0.9 % bolus 1,000 mL  1,000 mL Intravenous Once Gretta Acosta MD   Stopped at 07/03/21 2123   • [COMPLETED] sodium chloride 0.9 % bolus 1,000 mL  1,000 mL Intravenous Once Gretta Acosta MD   Stopped at 07/03/21 2255   • [COMPLETED] sodium chloride 0.9 % bolus 1,000 mL  1,000 mL Intravenous Once Gretta Acosta MD   Stopped at 07/04/21 0138    Followed by   • [COMPLETED] sodium chloride 0.9 % bolus 1,000 mL  1,000 mL Intravenous Once Gretta Acosta MD   Stopped at 07/04/21 0138   • [COMPLETED] sodium chloride 0.9 % bolus 2,000 mL  2,000 mL Intravenous Once Naya Wolfe MD 1,000 mL/hr at 07/04/21 0403 2,000 mL at 07/04/21 0403   • sodium chloride 0.9 % flush 3 mL  3 mL Intravenous Q12H Naya Wolfe MD   3 mL at 07/05/21 0958   • sucralfate (CARAFATE) tablet 1 g  1 g Oral 4x Daily AC & at Bedtime Naya Wolfe MD   1 g at 07/05/21 1118     Orders (last 24 hrs)      Start     Ordered    07/05/21 1124  POC Glucose Once  Once      07/05/21 1120    07/05/21 0903  Transfer Patient  Once      07/05/21 0902    07/05/21 0848  Basic Metabolic Panel  Daily      07/05/21 0847    07/05/21 0816  POC Glucose Once  Once      07/05/21 0807    07/05/21 0636  POC Glucose Once  Once      07/05/21 0633    07/05/21 0600  CBC & Differential  Morning Draw      07/04/21 0713    07/05/21 0600  Basic Metabolic Panel  Morning Draw      07/04/21 1657    07/05/21 0600  CBC Auto Differential  PROCEDURE ONCE      07/04/21 2204    07/05/21 0357  POC Glucose Once  Once      07/05/21 0354    07/05/21 0219  POC Glucose Once  Once      07/05/21 0218    07/04/21 2347  POC Glucose Once  Once      07/04/21  2343    07/04/21 2200  POC Glucose Once  Once      07/04/21 2158    07/04/21 2000  Phosphorus  Once,   Status:  Canceled      07/04/21 1720    07/04/21 1959  POC Glucose Once  Once      07/04/21 1958    07/04/21 1913  POC Glucose Once  Once      07/04/21 1907    07/04/21 1800  POC Glucose Finger Q2H  Every 2 Hours     Comments: Until able to replace sensor for continuous glucose monitor.      07/04/21 1714    07/04/21 1713  POC Glucose Once  Once      07/04/21 1711    07/04/21 1700  POC Glucose Finger 4x Daily AC & at Bedtime  4 Times Daily Before Meals & at Bedtime,   Status:  Canceled      07/04/21 1657    07/04/21 1700  Patient to restart home regimen per insulin pump when eating.  Nursing Communication  Once     Comments: Patient to restart home regimen per insulin pump when eating.    07/04/21 1912    07/04/21 1601  POC Glucose Once  Once      07/04/21 1557    07/04/21 1501  POC Glucose Once  Once      07/04/21 1458    07/04/21 1443  Diet Regular; Consistent Carbohydrate  Diet Effective Now      07/04/21 1443    07/04/21 1417  POC Glucose Once  Once      07/04/21 1357    07/04/21 1309  POC Glucose Once  Once      07/04/21 1306    07/04/21 1151  Patient Currently On Electrolyte Replacement Protocol - Please Refer to MAR for Protocol Details  Misc Nursing Order (Specify)  Daily     Comments: Patient Currently On Electrolyte Replacement Protocol - Please Refer to MAR for Protocol Details    07/04/21 1150    07/04/21 1150  calcium gluconate 1g/50ml 0.675% NaCl IV SOLN  As Needed      07/04/21 1150 07/04/21 1150  calcium gluconate 6 g in sodium chloride 0.9 % 500 mL IVPB  As Needed     Note to Pharmacy: Final concentration of bolus dose between 10 to 50 mg/mL.    07/04/21 1150    07/04/21 1150  potassium & sodium phosphates (PHOS-NAK) 280-160-250 MG packet - for Phosphorus less than 1.25 mg/dL  Every 6 Hours PRN      07/04/21 1150    07/04/21 1150  potassium & sodium phosphates (PHOS-NAK) 280-160-250 MG packet -  for Phosphorus 1.25 - 2.5 mg/dL  Every 6 Hours PRN      07/04/21 1150    07/04/21 1150  potassium chloride (K-DUR,KLOR-CON) ER tablet 40 mEq  As Needed      07/04/21 1150    07/04/21 1150  potassium chloride (KLOR-CON) packet 40 mEq  As Needed      07/04/21 1150    07/04/21 1150  potassium chloride 10 mEq in 100 mL IVPB  Every 1 Hour PRN      07/04/21 1150    07/04/21 1014  ondansetron (ZOFRAN) injection 4 mg  Every 4 Hours      07/04/21 0710    07/04/21 0900  DULoxetine (CYMBALTA) DR capsule 30 mg  Daily      07/04/21 0326 07/04/21 0900  pantoprazole (PROTONIX) injection 40 mg  Every 12 Hours Scheduled      07/04/21 0326    07/04/21 0900  sodium chloride 0.9 % flush 3 mL  Every 12 Hours Scheduled      07/04/21 0326 07/04/21 0730  sucralfate (CARAFATE) tablet 1 g  4 Times Daily Before Meals & Nightly      07/04/21 0326    07/04/21 0415  metoclopramide (REGLAN) injection 10 mg  Every 6 Hours      07/04/21 0326    07/04/21 0415  insulin regular (HumuLIN R,NovoLIN R) 100 Units in sodium chloride 0.9 % 100 mL (1 Units/mL) infusion  Titrated,   Status:  Discontinued      07/04/21 0326    07/04/21 0400  Vital Signs  Every Hour      07/04/21 0326    07/04/21 0400  Strict Intake & Output  Every Hour     Comments: While on insulin infusion.    07/04/21 0326    07/04/21 0400  Basic Metabolic Panel  Every 4 Hours,   Status:  Canceled      07/04/21 0326    07/04/21 0400  Magnesium  Every 4 Hours,   Status:  Canceled      07/04/21 0326    07/04/21 0400  Phosphorus  Every 4 Hours,   Status:  Canceled      07/04/21 0326    07/04/21 0400  POC Glucose Q1H  Every Hour,   Status:  Canceled      07/04/21 0326    07/04/21 0327  Daily Weights  Daily      07/04/21 0326    07/04/21 0327  Patient Currently On Electrolyte Replacement Protocol - Please Refer to MAR for Protocol Details  Misc Nursing Order (Specify)  Daily     Comments: Patient Currently On Electrolyte Replacement Protocol - Please Refer to MAR for Protocol Details     07/04/21 0326    07/04/21 0323  potassium phosphate 45 mmol in sodium chloride 0.9 % 500 mL infusion  As Needed      07/04/21 0326    07/04/21 0323  potassium phosphate 30 mmol in sodium chloride 0.9 % 250 mL infusion  As Needed      07/04/21 0326    07/04/21 0323  potassium phosphate 15 mmol in sodium chloride 0.9 % 100 mL infusion  As Needed      07/04/21 0326    07/04/21 0323  sodium phosphates 40 mmol in sodium chloride 0.9 % 500 mL IVPB  As Needed      07/04/21 0326    07/04/21 0323  sodium phosphates 20 mmol in sodium chloride 0.9 % 250 mL IVPB  As Needed      07/04/21 0326    07/04/21 0323  Magnesium Sulfate 2 gram Bolus, followed by 8 gram infusion (total Mg dose 10 grams)- Mg less than or equal to 1mg/dL  As Needed      07/04/21 0326    07/04/21 0323  Magnesium Sulfate 2 gram / 50mL Infusion (GIVE X 3 BAGS TO EQUAL 6GM TOTAL DOSE) - Mg 1.1 - 1.5 mg/dl  As Needed      07/04/21 0326    07/04/21 0323  Magnesium Sulfate 4 gram infusion- Mg 1.6-1.9 mg/dL  As Needed      07/04/21 0326    07/04/21 0323  potassium chloride (K-DUR,KLOR-CON) ER tablet 40 mEq  As Needed      07/04/21 0326    07/04/21 0323  potassium chloride (KLOR-CON) packet 40 mEq  As Needed      07/04/21 0326    07/04/21 0323  potassium chloride 10 mEq in 100 mL IVPB  Every 1 Hour PRN      07/04/21 0326    07/04/21 0322  sodium chloride 0.9 % with KCl 20 mEq/L infusion  Continuous PRN,   Status:  Discontinued      07/04/21 0326    07/04/21 0322  sodium chloride 0.9 % with KCl 40 mEq/L infusion  Continuous PRN,   Status:  Discontinued      07/04/21 0326 07/04/21 0322  dextrose 5 % and sodium chloride 0.9 % infusion  Continuous PRN,   Status:  Discontinued      07/04/21 0326 07/04/21 0322  dextrose 5 % and sodium chloride 0.9 % with KCl 40 mEq/L infusion  Continuous PRN,   Status:  Discontinued      07/04/21 0326    07/04/21 0322  dextrose 5 % and sodium chloride 0.9 % with KCl 20 mEq/L infusion  Continuous PRN,   Status:  Discontinued       07/04/21 0326    07/04/21 0322  sodium chloride 0.45 % infusion  Continuous PRN,   Status:  Discontinued      07/04/21 0326    07/04/21 0322  sodium chloride 0.45 % with KCl 20 mEq/L infusion  Continuous PRN,   Status:  Discontinued      07/04/21 0326    07/04/21 0322  sodium chloride 0.45 % 1,000 mL with potassium chloride 40 mEq infusion  Continuous PRN,   Status:  Discontinued      07/04/21 0326    07/04/21 0322  dextrose 5 % and sodium chloride 0.45 % infusion  Continuous PRN,   Status:  Discontinued      07/04/21 0326    07/04/21 0322  dextrose 5 % and sodium chloride 0.45 % with KCl 20 mEq/L infusion  Continuous PRN,   Status:  Discontinued      07/04/21 0326 07/04/21 0322  dextrose 5 % and sodium chloride 0.45 % with KCl 40 mEq/L infusion  Continuous PRN,   Status:  Discontinued      07/04/21 0326    07/04/21 0322  dextrose (D50W) 25 g/ 50mL Intravenous Solution 12.5 g  As Needed      07/04/21 0326    07/04/21 0322  sodium chloride 0.9 % flush 10 mL  Once As Needed,   Status:  Discontinued      07/04/21 0326    07/04/21 0322  sodium chloride 0.9 % infusion  Continuous PRN,   Status:  Discontinued      07/04/21 0326    07/04/21 0322  Magnesium  As Needed,   Status:  Canceled      07/04/21 0326    07/04/21 0322  sodium chloride 0.9 % infusion  Continuous PRN,   Status:  Discontinued      07/04/21 0326    07/04/21 0322  sodium chloride 0.9 % flush 10 mL  As Needed,   Status:  Discontinued      07/04/21 0326    07/04/21 0321  promethazine (PHENERGAN) suppository 25 mg  Every 6 Hours PRN      07/04/21 0326    Unscheduled  Potassium  As Needed      07/04/21 0326    Unscheduled  Magnesium  As Needed      07/04/21 1150    Unscheduled  Potassium  As Needed      07/04/21 1150    Unscheduled  Calcium  As Needed     Comments: 6 hours after infusion complete      07/04/21 1150                   Physician Progress Notes      Yayo Desai MD at 07/05/21 0846          [facility]      Mar/chart  reviewed  Follow up DKA  Less nausea     Vital Sign Min/Max for last 24 hours  Temp  Min: 98 °F (36.7 °C)  Max: 98.9 °F (37.2 °C)   BP  Min: 115/74  Max: 159/92   Pulse  Min: 86  Max: 121   No data recorded   SpO2  Min: 93 %  Max: 100 %   No data recorded   Weight  Min: 64.1 kg (141 lb 5 oz)  Max: 64.1 kg (141 lb 5 oz)     Appears ill, sleepy  perrl, eomi, no icterus,  mmm, no jvd, trachea midline, neck supple,  chest cta bilaterally, no crackles, no wheezes,   Tachy, regular  soft, nt, nd +bs,  no c/c/ e  Skin warm, dry no rashes    Labs: 7/5: reviewed:  Wbc 14.5  hgb 13.4  plts 285      DKA --resolved, back on home insulin pump  Diabetic gastroparesis  Type 1 diabetes mellitus  Upper GI bleed likely from esophageal tear due to repetitive dry heaving and vomiting    Transfer out of unit. Ensure adequate po intake, hopefully d/c home tomorrow.        Electronically signed by Yayo Desai MD at 07/05/21 0902     Da Fleming MD at 07/05/21 0818          Gastroenterology   Inpatient Progress Note    Reason for Follow Up:  DKA, nausea and vomiting, gastroparesis    Subjective     Interval History:   Patient is feeling better overall.  He reports less nausea and vomiting and no abdominal pain.  He has only taking in a small amount p.o.  After discussion with ICU nursing staff he is on daily insulin drip    Current Facility-Administered Medications:   •  calcium gluconate 1g/50ml 0.675% NaCl IV SOLN, 1 g, Intravenous, PRN **AND** calcium gluconate 6 g in sodium chloride 0.9 % 500 mL IVPB, 6 g, Intravenous, PRN **AND** Calcium, , , PRN, Yayo Desai MD  •  dextrose (D50W) 25 g/ 50mL Intravenous Solution 12.5 g, 12.5 g, Intravenous, PRN, Naya Wofle MD  •  DULoxetine (CYMBALTA) DR capsule 30 mg, 30 mg, Oral, Daily, Naya Wolfe MD  •  Magnesium Sulfate 2 gram Bolus, followed by 8 gram infusion (total Mg dose 10 grams)- Mg less than or equal to 1mg/dL, 2 g, Intravenous, PRN **OR** Magnesium Sulfate 2  gram / 50mL Infusion (GIVE X 3 BAGS TO EQUAL 6GM TOTAL DOSE) - Mg 1.1 - 1.5 mg/dl, 2 g, Intravenous, PRN **OR** Magnesium Sulfate 4 gram infusion- Mg 1.6-1.9 mg/dL, 4 g, Intravenous, PRN, Naya Wolfe MD, Last Rate: 25 mL/hr at 07/04/21 2130, 4 g at 07/04/21 2130  •  metoclopramide (REGLAN) injection 10 mg, 10 mg, Intravenous, Q6H, Naya Wolfe MD, 10 mg at 07/05/21 0402  •  ondansetron (ZOFRAN) injection 4 mg, 4 mg, Intravenous, Q4H, Lena Aguayo MD, 4 mg at 07/05/21 0632  •  pantoprazole (PROTONIX) injection 40 mg, 40 mg, Intravenous, Q12H, Naya Wolfe MD, 40 mg at 07/04/21 2101  •  potassium & sodium phosphates (PHOS-NAK) 280-160-250 MG packet - for Phosphorus less than 1.25 mg/dL, 2 packet, Oral, Q6H PRN **OR** potassium & sodium phosphates (PHOS-NAK) 280-160-250 MG packet - for Phosphorus 1.25 - 2.5 mg/dL, 2 packet, Oral, Q6H PRN, Yayo Desai MD, 2 packet at 07/04/21 1400  •  potassium chloride (K-DUR,KLOR-CON) ER tablet 40 mEq, 40 mEq, Oral, PRN **OR** potassium chloride (KLOR-CON) packet 40 mEq, 40 mEq, Oral, PRN **OR** potassium chloride 10 mEq in 100 mL IVPB, 10 mEq, Intravenous, Q1H PRN, Naya Wolfe MD  •  potassium chloride (K-DUR,KLOR-CON) ER tablet 40 mEq, 40 mEq, Oral, PRN **OR** potassium chloride (KLOR-CON) packet 40 mEq, 40 mEq, Oral, PRN **OR** potassium chloride 10 mEq in 100 mL IVPB, 10 mEq, Intravenous, Q1H PRN, Yayo Desai MD  •  potassium phosphate 45 mmol in sodium chloride 0.9 % 500 mL infusion, 45 mmol, Intravenous, PRN, Last Rate: 62.5 mL/hr at 07/04/21 2210, 45 mmol at 07/04/21 2210 **OR** potassium phosphate 30 mmol in sodium chloride 0.9 % 250 mL infusion, 30 mmol, Intravenous, PRN **OR** potassium phosphate 15 mmol in sodium chloride 0.9 % 100 mL infusion, 15 mmol, Intravenous, PRN **OR** sodium phosphates 40 mmol in sodium chloride 0.9 % 500 mL IVPB, 40 mmol, Intravenous, PRN **OR** sodium phosphates 20 mmol in sodium chloride 0.9 % 250 mL IVPB, 20 mmol,  Intravenous, PRN, Naya Wolfe MD  •  promethazine (PHENERGAN) suppository 25 mg, 25 mg, Rectal, Q6H PRN, Naya Wolfe MD, 25 mg at 07/04/21 0627  •  sodium chloride 0.9 % flush 3 mL, 3 mL, Intravenous, Q12H, Naya Wolfe MD, 3 mL at 07/04/21 2102  •  sucralfate (CARAFATE) tablet 1 g, 1 g, Oral, 4x Daily AC & at Bedtime, Naya Wolfe MD, 1 g at 07/05/21 0632  Review of Systems:               The following systems were reviewed and negative;  gastrointestinal    Objective     Vital Signs  Temp:  [98 °F (36.7 °C)-98.9 °F (37.2 °C)] 98 °F (36.7 °C)  Heart Rate:  [] 89  BP: (115-159)/() 130/86  Body mass index is 20.87 kg/m².    Intake/Output Summary (Last 24 hours) at 7/5/2021 0818  Last data filed at 7/5/2021 0600  Gross per 24 hour   Intake 4673 ml   Output 3025 ml   Net 1648 ml     No intake/output data recorded.                Physical Exam:              General: patient awake, alert and cooperative, mildly chronically ill appearing              Eyes: no scleral icterus              Skin: warm and dry, not jaundiced              Abdomen: soft, nontender, nondistended; normal bowel sounds, no masses palpated, no periumbical lymphadenopathy              Psychiatric: Appropriate affect and behavior                Results Review:                I reviewed the patient's new clinical results.    Results from last 7 days   Lab Units 07/05/21  0001 07/04/21  0357 07/04/21  0022   WBC 10*3/mm3 14.50* 14.33* 13.69*   HEMOGLOBIN g/dL 13.4 11.8* 11.6*   HEMATOCRIT % 39.6 34.2* 35.0*   PLATELETS 10*3/mm3 285 269 253     Results from last 7 days   Lab Units 07/04/21  2346 07/04/21  1612 07/04/21  1224 07/04/21  0357 07/04/21  0022 07/03/21  1741   SODIUM mmol/L 140 138 139 143  141 138 137   POTASSIUM mmol/L 3.8 4.0 3.8 4.0  3.9 4.2 4.4   CHLORIDE mmol/L 110* 107 110* 110*  108* 107 97*   CO2 mmol/L 18.6* 18.5* 21.5* 18.0*  17.7* 13.7* 17.9*   BUN mg/dL 7 10 12 19  19 25* 29*   CREATININE mg/dL 0.72*  0.72* 0.78 0.74*  0.77 0.87 1.00   CALCIUM mg/dL 7.7* 7.4* 7.4* 7.7*  7.6* 7.5* 10.3   BILIRUBIN mg/dL  --   --   --  0.5 0.5 1.0   ALK PHOS U/L  --   --   --  54 59 83   ALT (SGPT) U/L  --   --   --  12 10 16   AST (SGOT) U/L  --   --   --  11 12 16   GLUCOSE mg/dL 113* 198* 146* 147*  148* 247* 225*         Lab Results   Lab Value Date/Time    LIPASE 16 07/03/2021 1741    LIPASE 15 05/23/2021 1328    LIPASE 11 (L) 04/22/2021 2241    LIPASE 23 04/14/2021 1831    LIPASE 12 (L) 03/20/2021 1834       Radiology:  No orders to display       Assessment/Plan     Patient Active Problem List   Diagnosis   • Type 1 diabetes mellitus with hyperglycemia (CMS/HCC)   • Fecal incontinence   • Erectile dysfunction   • Anxiety and depression   • Hyperplastic colonic polyp   • Gastroparesis   • Family history of colorectal cancer   • Sepsis without acute organ dysfunction (CMS/HCC)   • Alcohol abuse   • Tobacco abuse   • Acute upper GI bleed   • Non-intractable vomiting with nausea   • Leukocytosis   • Marijuana abuse       Assessment:  1. Gastroparesis  2. DKA, improving  3. Leukocytosis  4. Nausea and vomiting improving  5. History of esophagitis  6. Dehydration being repleted with IV fluids  7.  Tachycardia      Plan:  Current regimen seems to be working.  The patient is off of IV insulin.  He is feeling better with Phenergan and Zofran with much less nausea and vomiting.  This may be due to the Ativan that he received.  He is receiving aggressive management for his blood sugars and DKA.  Still a little bit tachycardic may be still a little dehydrated.  The DKA management as per the ICU intensivist possibly he will be able to move out of the ICU.    Today we will continue to aggressively manage his nausea and vomiting and he will attempt p.o. intake    Plans for motility evaluation at the [facility] in progress  I discussed the patients findings and my recommendations with patient and nursing staff.              Da Fleming M.D.  [facility] [address]  Office: [phone]    Electronically signed by Da Fleming MD at 07/05/21 5536

## 2021-07-05 NOTE — PLAN OF CARE
Goal Outcome Evaluation:      Pt has ambulated most of the day in the halls. No complaints of nausea or vomiting. Blood sugars have been controlled better. Pt has home insulin pumped turned on to help regulate.

## 2021-07-05 NOTE — PROGRESS NOTES
Gastroenterology   Inpatient Progress Note    Reason for Follow Up:  DKA, nausea and vomiting, gastroparesis    Subjective     Interval History:   Patient is feeling better overall.  He reports less nausea and vomiting and no abdominal pain.  He has only taking in a small amount p.o.  After discussion with ICU nursing staff he is on daily insulin drip    Current Facility-Administered Medications:   •  calcium gluconate 1g/50ml 0.675% NaCl IV SOLN, 1 g, Intravenous, PRN **AND** calcium gluconate 6 g in sodium chloride 0.9 % 500 mL IVPB, 6 g, Intravenous, PRN **AND** Calcium, , , PRN, Yayo Desai MD  •  dextrose (D50W) 25 g/ 50mL Intravenous Solution 12.5 g, 12.5 g, Intravenous, PRN, Naya Wolfe MD  •  DULoxetine (CYMBALTA) DR capsule 30 mg, 30 mg, Oral, Daily, Naya Wolfe MD  •  Magnesium Sulfate 2 gram Bolus, followed by 8 gram infusion (total Mg dose 10 grams)- Mg less than or equal to 1mg/dL, 2 g, Intravenous, PRN **OR** Magnesium Sulfate 2 gram / 50mL Infusion (GIVE X 3 BAGS TO EQUAL 6GM TOTAL DOSE) - Mg 1.1 - 1.5 mg/dl, 2 g, Intravenous, PRN **OR** Magnesium Sulfate 4 gram infusion- Mg 1.6-1.9 mg/dL, 4 g, Intravenous, PRN, Naya Wolfe MD, Last Rate: 25 mL/hr at 07/04/21 2130, 4 g at 07/04/21 2130  •  metoclopramide (REGLAN) injection 10 mg, 10 mg, Intravenous, Q6H, Naya Wolfe MD, 10 mg at 07/05/21 0402  •  ondansetron (ZOFRAN) injection 4 mg, 4 mg, Intravenous, Q4H, Lena Aguayo MD, 4 mg at 07/05/21 0632  •  pantoprazole (PROTONIX) injection 40 mg, 40 mg, Intravenous, Q12H, Naya Wolfe MD, 40 mg at 07/04/21 2101  •  potassium & sodium phosphates (PHOS-NAK) 280-160-250 MG packet - for Phosphorus less than 1.25 mg/dL, 2 packet, Oral, Q6H PRN **OR** potassium & sodium phosphates (PHOS-NAK) 280-160-250 MG packet - for Phosphorus 1.25 - 2.5 mg/dL, 2 packet, Oral, Q6H PRN, Yayo Desai MD, 2 packet at 07/04/21 1400  •  potassium chloride (K-DUR,KLOR-CON) ER tablet 40 mEq, 40 mEq,  Oral, PRN **OR** potassium chloride (KLOR-CON) packet 40 mEq, 40 mEq, Oral, PRN **OR** potassium chloride 10 mEq in 100 mL IVPB, 10 mEq, Intravenous, Q1H PRN, Naya Wolfe MD  •  potassium chloride (K-DUR,KLOR-CON) ER tablet 40 mEq, 40 mEq, Oral, PRN **OR** potassium chloride (KLOR-CON) packet 40 mEq, 40 mEq, Oral, PRN **OR** potassium chloride 10 mEq in 100 mL IVPB, 10 mEq, Intravenous, Q1H PRN, Yayo Desai MD  •  potassium phosphate 45 mmol in sodium chloride 0.9 % 500 mL infusion, 45 mmol, Intravenous, PRN, Last Rate: 62.5 mL/hr at 07/04/21 2210, 45 mmol at 07/04/21 2210 **OR** potassium phosphate 30 mmol in sodium chloride 0.9 % 250 mL infusion, 30 mmol, Intravenous, PRN **OR** potassium phosphate 15 mmol in sodium chloride 0.9 % 100 mL infusion, 15 mmol, Intravenous, PRN **OR** sodium phosphates 40 mmol in sodium chloride 0.9 % 500 mL IVPB, 40 mmol, Intravenous, PRN **OR** sodium phosphates 20 mmol in sodium chloride 0.9 % 250 mL IVPB, 20 mmol, Intravenous, PRN, Naya Wolfe MD  •  promethazine (PHENERGAN) suppository 25 mg, 25 mg, Rectal, Q6H PRN, Naya Wolfe MD, 25 mg at 07/04/21 0627  •  sodium chloride 0.9 % flush 3 mL, 3 mL, Intravenous, Q12H, Naya Wolfe MD, 3 mL at 07/04/21 2102  •  sucralfate (CARAFATE) tablet 1 g, 1 g, Oral, 4x Daily AC & at Bedtime, Naya Wolfe MD, 1 g at 07/05/21 0632  Review of Systems:               The following systems were reviewed and negative;  gastrointestinal    Objective     Vital Signs  Temp:  [98 °F (36.7 °C)-98.9 °F (37.2 °C)] 98 °F (36.7 °C)  Heart Rate:  [] 89  BP: (115-159)/() 130/86  Body mass index is 20.87 kg/m².    Intake/Output Summary (Last 24 hours) at 7/5/2021 0818  Last data filed at 7/5/2021 0600  Gross per 24 hour   Intake 4673 ml   Output 3025 ml   Net 1648 ml     No intake/output data recorded.                Physical Exam:              General: patient awake, alert and cooperative, mildly chronically ill appearing               Eyes: no scleral icterus              Skin: warm and dry, not jaundiced              Abdomen: soft, nontender, nondistended; normal bowel sounds, no masses palpated, no periumbical lymphadenopathy              Psychiatric: Appropriate affect and behavior                Results Review:                I reviewed the patient's new clinical results.    Results from last 7 days   Lab Units 07/05/21  0001 07/04/21  0357 07/04/21  0022   WBC 10*3/mm3 14.50* 14.33* 13.69*   HEMOGLOBIN g/dL 13.4 11.8* 11.6*   HEMATOCRIT % 39.6 34.2* 35.0*   PLATELETS 10*3/mm3 285 269 253     Results from last 7 days   Lab Units 07/04/21  2346 07/04/21  1612 07/04/21  1224 07/04/21  0357 07/04/21  0022 07/03/21  1741   SODIUM mmol/L 140 138 139 143  141 138 137   POTASSIUM mmol/L 3.8 4.0 3.8 4.0  3.9 4.2 4.4   CHLORIDE mmol/L 110* 107 110* 110*  108* 107 97*   CO2 mmol/L 18.6* 18.5* 21.5* 18.0*  17.7* 13.7* 17.9*   BUN mg/dL 7 10 12 19  19 25* 29*   CREATININE mg/dL 0.72* 0.72* 0.78 0.74*  0.77 0.87 1.00   CALCIUM mg/dL 7.7* 7.4* 7.4* 7.7*  7.6* 7.5* 10.3   BILIRUBIN mg/dL  --   --   --  0.5 0.5 1.0   ALK PHOS U/L  --   --   --  54 59 83   ALT (SGPT) U/L  --   --   --  12 10 16   AST (SGOT) U/L  --   --   --  11 12 16   GLUCOSE mg/dL 113* 198* 146* 147*  148* 247* 225*         Lab Results   Lab Value Date/Time    LIPASE 16 07/03/2021 1741    LIPASE 15 05/23/2021 1328    LIPASE 11 (L) 04/22/2021 2241    LIPASE 23 04/14/2021 1831    LIPASE 12 (L) 03/20/2021 1834       Radiology:  No orders to display       Assessment/Plan     Patient Active Problem List   Diagnosis   • Type 1 diabetes mellitus with hyperglycemia (CMS/HCC)   • Fecal incontinence   • Erectile dysfunction   • Anxiety and depression   • Hyperplastic colonic polyp   • Gastroparesis   • Family history of colorectal cancer   • Sepsis without acute organ dysfunction (CMS/HCC)   • Alcohol abuse   • Tobacco abuse   • Acute upper GI bleed   • Non-intractable vomiting with  nausea   • Leukocytosis   • Marijuana abuse       Assessment:  1. Gastroparesis  2. DKA, improving  3. Leukocytosis  4. Nausea and vomiting improving  5. History of esophagitis  6. Dehydration being repleted with IV fluids  7.  Tachycardia      Plan:  Current regimen seems to be working.  The patient is off of IV insulin.  He is feeling better with Phenergan and Zofran with much less nausea and vomiting.  This may be due to the Ativan that he received.  He is receiving aggressive management for his blood sugars and DKA.  Still a little bit tachycardic may be still a little dehydrated.  The DKA management as per the ICU intensivist possibly he will be able to move out of the ICU.    Today we will continue to aggressively manage his nausea and vomiting and he will attempt p.o. intake    Plans for motility evaluation at the [facility] in progress  I discussed the patients findings and my recommendations with patient and nursing staff.             Da Fleming M.D.  [facility] [place] [address]  Office: [phone]

## 2021-07-06 ENCOUNTER — READMISSION MANAGEMENT (OUTPATIENT)
Dept: CALL CENTER | Facility: HOSPITAL | Age: 31
End: 2021-07-06

## 2021-07-06 VITALS
SYSTOLIC BLOOD PRESSURE: 137 MMHG | RESPIRATION RATE: 16 BRPM | BODY MASS INDEX: 19.16 KG/M2 | WEIGHT: 129.4 LBS | DIASTOLIC BLOOD PRESSURE: 88 MMHG | TEMPERATURE: 97.2 F | HEIGHT: 69 IN | HEART RATE: 104 BPM | OXYGEN SATURATION: 97 %

## 2021-07-06 PROBLEM — E11.10 DKA (DIABETIC KETOACIDOSES): Status: RESOLVED | Noted: 2021-07-06 | Resolved: 2021-07-06

## 2021-07-06 PROBLEM — E11.10 DKA (DIABETIC KETOACIDOSES): Status: ACTIVE | Noted: 2021-07-06

## 2021-07-06 LAB
ANION GAP SERPL CALCULATED.3IONS-SCNC: 9.2 MMOL/L (ref 5–15)
BUN SERPL-MCNC: 4 MG/DL (ref 6–20)
BUN/CREAT SERPL: 5.6 (ref 7–25)
CALCIUM SPEC-SCNC: 8.6 MG/DL (ref 8.6–10.5)
CHLORIDE SERPL-SCNC: 103 MMOL/L (ref 98–107)
CO2 SERPL-SCNC: 26.8 MMOL/L (ref 22–29)
CREAT SERPL-MCNC: 0.72 MG/DL (ref 0.76–1.27)
GFR SERPL CREATININE-BSD FRML MDRD: 128 ML/MIN/1.73
GLUCOSE BLDC GLUCOMTR-MCNC: 103 MG/DL (ref 70–130)
GLUCOSE BLDC GLUCOMTR-MCNC: 116 MG/DL (ref 70–130)
GLUCOSE BLDC GLUCOMTR-MCNC: 153 MG/DL (ref 70–130)
GLUCOSE BLDC GLUCOMTR-MCNC: 156 MG/DL (ref 70–130)
GLUCOSE BLDC GLUCOMTR-MCNC: 178 MG/DL (ref 70–130)
GLUCOSE BLDC GLUCOMTR-MCNC: 197 MG/DL (ref 70–130)
GLUCOSE BLDC GLUCOMTR-MCNC: 225 MG/DL (ref 70–130)
GLUCOSE BLDC GLUCOMTR-MCNC: 78 MG/DL (ref 70–130)
GLUCOSE SERPL-MCNC: 146 MG/DL (ref 65–99)
PHOSPHATE SERPL-MCNC: 1.7 MG/DL (ref 2.5–4.5)
POTASSIUM SERPL-SCNC: 4 MMOL/L (ref 3.5–5.2)
POTASSIUM SERPL-SCNC: 4 MMOL/L (ref 3.5–5.2)
SARS-COV-2 RNA RESP QL NAA+PROBE: NOT DETECTED
SODIUM SERPL-SCNC: 139 MMOL/L (ref 136–145)

## 2021-07-06 PROCEDURE — 25010000002 METOCLOPRAMIDE PER 10 MG: Performed by: INTERNAL MEDICINE

## 2021-07-06 PROCEDURE — 84132 ASSAY OF SERUM POTASSIUM: CPT | Performed by: INTERNAL MEDICINE

## 2021-07-06 PROCEDURE — 84100 ASSAY OF PHOSPHORUS: CPT | Performed by: INTERNAL MEDICINE

## 2021-07-06 PROCEDURE — U0003 INFECTIOUS AGENT DETECTION BY NUCLEIC ACID (DNA OR RNA); SEVERE ACUTE RESPIRATORY SYNDROME CORONAVIRUS 2 (SARS-COV-2) (CORONAVIRUS DISEASE [COVID-19]), AMPLIFIED PROBE TECHNIQUE, MAKING USE OF HIGH THROUGHPUT TECHNOLOGIES AS DESCRIBED BY CMS-2020-01-R: HCPCS | Performed by: INTERNAL MEDICINE

## 2021-07-06 PROCEDURE — 80048 BASIC METABOLIC PNL TOTAL CA: CPT | Performed by: INTERNAL MEDICINE

## 2021-07-06 PROCEDURE — 99232 SBSQ HOSP IP/OBS MODERATE 35: CPT | Performed by: INTERNAL MEDICINE

## 2021-07-06 PROCEDURE — 82962 GLUCOSE BLOOD TEST: CPT

## 2021-07-06 PROCEDURE — C9803 HOPD COVID-19 SPEC COLLECT: HCPCS | Performed by: INTERNAL MEDICINE

## 2021-07-06 RX ADMIN — Medication 2 PACKET: at 01:48

## 2021-07-06 RX ADMIN — PANTOPRAZOLE SODIUM 40 MG: 40 INJECTION, POWDER, FOR SOLUTION INTRAVENOUS at 09:11

## 2021-07-06 RX ADMIN — SUCRALFATE 1 G: 1 TABLET ORAL at 09:11

## 2021-07-06 RX ADMIN — SODIUM CHLORIDE, PRESERVATIVE FREE 3 ML: 5 INJECTION INTRAVENOUS at 09:13

## 2021-07-06 RX ADMIN — DULOXETINE HYDROCHLORIDE 30 MG: 30 CAPSULE, DELAYED RELEASE ORAL at 09:11

## 2021-07-06 RX ADMIN — METOCLOPRAMIDE HYDROCHLORIDE 10 MG: 5 INJECTION INTRAMUSCULAR; INTRAVENOUS at 09:15

## 2021-07-06 RX ADMIN — METOCLOPRAMIDE HYDROCHLORIDE 10 MG: 5 INJECTION INTRAMUSCULAR; INTRAVENOUS at 04:53

## 2021-07-06 RX ADMIN — SUCRALFATE 1 G: 1 TABLET ORAL at 12:58

## 2021-07-06 NOTE — PROGRESS NOTES
Gastroenterology   Inpatient Progress Note    Reason for Follow Up: DKA, gastroparesis    Subjective     Interval History:   Patient is feeling much better today.  He denies any nausea or vomiting.  He did tolerate breakfast just fine.  He states his blood glucose levels have been improved.    Current Facility-Administered Medications:   •  calcium gluconate 1g/50ml 0.675% NaCl IV SOLN, 1 g, Intravenous, PRN **AND** calcium gluconate 6 g in sodium chloride 0.9 % 500 mL IVPB, 6 g, Intravenous, PRN **AND** Calcium, , , PRN, Yayo Desai MD  •  dextrose (D50W) 25 g/ 50mL Intravenous Solution 12.5 g, 12.5 g, Intravenous, PRN, Naya Wolfe MD  •  DULoxetine (CYMBALTA) DR capsule 30 mg, 30 mg, Oral, Daily, Naya Wolfe MD, 30 mg at 07/05/21 0920  •  Magnesium Sulfate 2 gram Bolus, followed by 8 gram infusion (total Mg dose 10 grams)- Mg less than or equal to 1mg/dL, 2 g, Intravenous, PRN **OR** Magnesium Sulfate 2 gram / 50mL Infusion (GIVE X 3 BAGS TO EQUAL 6GM TOTAL DOSE) - Mg 1.1 - 1.5 mg/dl, 2 g, Intravenous, PRN **OR** Magnesium Sulfate 4 gram infusion- Mg 1.6-1.9 mg/dL, 4 g, Intravenous, PRN, Naya Wolfe MD, Last Rate: 25 mL/hr at 07/04/21 2130, 4 g at 07/04/21 2130  •  metoclopramide (REGLAN) injection 10 mg, 10 mg, Intravenous, Q6H, Naya Wolfe MD, 10 mg at 07/06/21 0453  •  ondansetron (ZOFRAN) injection 4 mg, 4 mg, Intravenous, Q4H, Lena Aguayo MD, 4 mg at 07/05/21 0920  •  pantoprazole (PROTONIX) injection 40 mg, 40 mg, Intravenous, Q12H, Naya Wolfe MD, 40 mg at 07/05/21 2151  •  potassium & sodium phosphates (PHOS-NAK) 280-160-250 MG packet - for Phosphorus less than 1.25 mg/dL, 2 packet, Oral, Q6H PRN **OR** potassium & sodium phosphates (PHOS-NAK) 280-160-250 MG packet - for Phosphorus 1.25 - 2.5 mg/dL, 2 packet, Oral, Q6H PRN, Yayo Desai MD, 2 packet at 07/06/21 0148  •  potassium chloride (K-DUR,KLOR-CON) ER tablet 40 mEq, 40 mEq, Oral, PRN **OR** potassium chloride  (KLOR-CON) packet 40 mEq, 40 mEq, Oral, PRN **OR** potassium chloride 10 mEq in 100 mL IVPB, 10 mEq, Intravenous, Q1H PRN, Naya Wolfe MD  •  potassium chloride (K-DUR,KLOR-CON) ER tablet 40 mEq, 40 mEq, Oral, PRN, 40 mEq at 07/05/21 1118 **OR** potassium chloride (KLOR-CON) packet 40 mEq, 40 mEq, Oral, PRN **OR** potassium chloride 10 mEq in 100 mL IVPB, 10 mEq, Intravenous, Q1H PRN, Yayo Desai MD  •  potassium phosphate 45 mmol in sodium chloride 0.9 % 500 mL infusion, 45 mmol, Intravenous, PRN, Last Rate: 62.5 mL/hr at 07/04/21 2210, 45 mmol at 07/04/21 2210 **OR** potassium phosphate 30 mmol in sodium chloride 0.9 % 250 mL infusion, 30 mmol, Intravenous, PRN **OR** potassium phosphate 15 mmol in sodium chloride 0.9 % 100 mL infusion, 15 mmol, Intravenous, PRN **OR** sodium phosphates 40 mmol in sodium chloride 0.9 % 500 mL IVPB, 40 mmol, Intravenous, PRN **OR** sodium phosphates 20 mmol in sodium chloride 0.9 % 250 mL IVPB, 20 mmol, Intravenous, PRN, Naya Wolfe MD  •  promethazine (PHENERGAN) suppository 25 mg, 25 mg, Rectal, Q6H PRN, Naya Wolfe MD, 25 mg at 07/04/21 0627  •  sodium chloride 0.9 % flush 3 mL, 3 mL, Intravenous, Q12H, Naya Wolfe MD, 3 mL at 07/05/21 2151  •  sucralfate (CARAFATE) tablet 1 g, 1 g, Oral, 4x Daily AC & at Bedtime, Naya Wolfe MD, 1 g at 07/05/21 2151  Review of Systems:    The following systems were reviewed and negative;  gastrointestinal    Objective     Vital Signs  Temp:  [97.2 °F (36.2 °C)-97.7 °F (36.5 °C)] 97.4 °F (36.3 °C)  Heart Rate:  [] 109  Resp:  [16-18] 16  BP: (125-144)/() 139/87  Body mass index is 19.11 kg/m².    Intake/Output Summary (Last 24 hours) at 7/6/2021 0858  Last data filed at 7/6/2021 0149  Gross per 24 hour   Intake 220 ml   Output 575 ml   Net -355 ml     No intake/output data recorded.     Physical Exam:   General: patient awake, alert and cooperative   Eyes: no scleral icterus   Skin: warm and dry, not  jaundiced   Abdomen: soft, nontender, nondistended; normal bowel sounds, no masses palpated, no periumbical lymphadenopathy   Psychiatric: Appropriate affect and behavior     Results Review:     I reviewed the patient's new clinical results.    Results from last 7 days   Lab Units 07/05/21  0001 07/04/21  0357 07/04/21  0022   WBC 10*3/mm3 14.50* 14.33* 13.69*   HEMOGLOBIN g/dL 13.4 11.8* 11.6*   HEMATOCRIT % 39.6 34.2* 35.0*   PLATELETS 10*3/mm3 285 269 253     Results from last 7 days   Lab Units 07/06/21  0050 07/05/21  0929 07/04/21  2346 07/04/21  1612 07/04/21  0357 07/04/21  0022 07/03/21  1741   SODIUM mmol/L  --  141 140 138 143  141 138 137   POTASSIUM mmol/L 4.0 3.3* 3.8 4.0 4.0  3.9 4.2 4.4   CHLORIDE mmol/L  --  109* 110* 107 110*  108* 107 97*   CO2 mmol/L  --  22.9 18.6* 18.5* 18.0*  17.7* 13.7* 17.9*   BUN mg/dL  --  5* 7 10 19  19 25* 29*   CREATININE mg/dL  --  0.73* 0.72* 0.72* 0.74*  0.77 0.87 1.00   CALCIUM mg/dL  --  8.0* 7.7* 7.4* 7.7*  7.6* 7.5* 10.3   BILIRUBIN mg/dL  --   --   --   --  0.5 0.5 1.0   ALK PHOS U/L  --   --   --   --  54 59 83   ALT (SGPT) U/L  --   --   --   --  12 10 16   AST (SGOT) U/L  --   --   --   --  11 12 16   GLUCOSE mg/dL  --  71 113* 198* 147*  148* 247* 225*         Lab Results   Lab Value Date/Time    LIPASE 16 07/03/2021 1741    LIPASE 15 05/23/2021 1328    LIPASE 11 (L) 04/22/2021 2241    LIPASE 23 04/14/2021 1831    LIPASE 12 (L) 03/20/2021 1834       Radiology:  No orders to display       Assessment/Plan     Patient Active Problem List   Diagnosis   • Type 1 diabetes mellitus with hyperglycemia (CMS/HCC)   • Fecal incontinence   • Erectile dysfunction   • Anxiety and depression   • Hyperplastic colonic polyp   • Gastroparesis   • Family history of colorectal cancer   • Sepsis without acute organ dysfunction (CMS/HCC)   • Alcohol abuse   • Tobacco abuse   • Acute upper GI bleed   • Non-intractable vomiting with nausea   • Leukocytosis   • Marijuana  abuse     These problems are new to me  Assessment:  1. Gastroparesis.  This is a chronic issue with recent exacerbation.  2. DKA.  Resolved.  3. Nausea and vomiting.  Resolved.  4. Dehydration.  Status post repletion with IV fluids.  5. Leukocytosis.  Stable.  Likely reactive.      Plan:  · Agree with evaluation by the [facility] motility group next month.  · Continue supportive care and strict antireflux measures  · As needed antiemetics.  I discussed the patients findings and my recommendations with patient and nursing staff.    MD Vinh Mayer M.D.  [facility] [place] [address]  Office: [phone]

## 2021-07-06 NOTE — CASE MANAGEMENT/SOCIAL WORK
Case Management Discharge Note      Final Note: Home---no needs per CCP. F/U appointments scheduled.         Selected Continued Care - Discharged on 7/6/2021 Admission date: 7/4/2021 - Discharge disposition: Home or Self Care    Destination    No services have been selected for the patient.              Durable Medical Equipment    No services have been selected for the patient.              Dialysis/Infusion    No services have been selected for the patient.              Home Medical Care    No services have been selected for the patient.              Therapy    No services have been selected for the patient.              Community Resources    No services have been selected for the patient.              Community & DME    No services have been selected for the patient.                       Final Discharge Disposition Code: 01 - home or self-care

## 2021-07-06 NOTE — PLAN OF CARE
Goal Outcome Evaluation:           Progress: improving  Outcome Summary: Pt transferred to [facility] yesterday from ICU. Up Adlib. Home insulin pump in use. Blood glucose checks q2 hours. One episode of hypoglycemia tonight w/ BG @ 78, but 240 ml of apple juice given and increased BG. Phosphorus protocal completed this shift. No c/o pain n/v/d. VSS. Will continue to monitor.

## 2021-07-06 NOTE — OUTREACH NOTE
Prep Survey      Responses   [facility] patient discharged from?  [place]   Is LACE score < 7 ?  No   Emergency Room discharge w/ pulse ox?  No   Eligibility  [facility]   Date of Admission  07/04/21   Date of Discharge  07/06/21   Discharge Disposition  Home or Self Care   Discharge diagnosis  DKA    Does the patient have one of the following disease processes/diagnoses(primary or secondary)?  Other   Does the patient have Home health ordered?  No   Is there a DME ordered?  No   Prep survey completed?  Yes          Aundrea Jaime RN

## 2021-07-06 NOTE — DISCHARGE SUMMARY
[facility]    Admit date: 7/4/2021  Discharge date: 7/6/2021    Admission/discharge diagnosis:  DKA   Diabetic gastroparesis  Type I DM  Dehydration  luekocytosis    HPI: Reviewed as per Dr. Wolfe:  Morro Blancas is a 30 y.o. male with history of type 1 diabetes mellitus with severe gastroparesis that has resulted and several prior admission with uncontrolled diabetes and intractable nausea and vomiting.  Has been having problem for the last 5 to 7 days, eventually ended up in the emergency room at [facility] and requested to be admitted to [facility] where he usually gets most of his care.  Patient has multiple prior admission and his discharge summary from this admission were reviewed.  He was followed by GI and was supposed to be set up with the motility clinic sometime this week  Patient denies any fever or chills  He does have nausea and vomiting and he noticed some blood in his vomitus but it was not large quantities  He had a bowel movement 2 hours prior to transfer and it was not bloody  Patient was supposed to be managed without the insulin drip however on his repeat chemistry his anion gap was still elevated and his bicarbonate was trending down so he was upgraded from a telemetry transfer and was admitted to the ICU instead for management of DKA.  He had 7 units of insulin IV push and has not been started on the insulin drip yet but he did receive 4000 cc of crystalloids  Patient is already feeling slightly better, his breathing is no longer heavy or deep in his acidosis was not that severe.  No state of any peptic ulcer disease or upper GI bleed in the past, no significant liver disease or alcoholism.  No altered mental status  He did have similar previous episode  His insulin pump is currently turned off but he was getting his sliding scale insulin until 2 hours prior to presentation to the [facility]    Hospital Course;  Mr. Blancas was admitted to ICU. DKA resolved  quickly and he was able to resume a diet.  He was evaluated by GI and recommended to  motility group.  Of note, marijuana use has been associated with hyperemesis syndrome and I would recommend he discontinue use of marijuana.      Diet: pre admission    Activity: pre admission. Recommend smoking cessation and discontinue use of marijuana    Follow up:  Endocrinology in the next 2 weeks   motility clinic as scheduled    Dispo: home    Greater than 30 mins spent in discharging patient           Discharge Medications      Continue These Medications      Instructions Start Date   Dexcom G6 Sensor   No dose, route, or frequency recorded.      DULoxetine 30 MG capsule  Commonly known as: CYMBALTA   30 mg, Oral, Daily      Glucagon Emergency 1 MG kit   As Needed      Ketone Test strip   1 strip, In Vitro, As Needed      metoclopramide 5 MG tablet  Commonly known as: Reglan   5 mg, Oral, 4 Times Daily Before Meals & Nightly PRN      multivitamin with minerals tablet tablet   1 tablet, Oral, Daily, Chewable gummie       OmniPod Dash 5 Pack Pods misc   0.85 Units, Does not apply, Basal rate recently decreased 1.5 u/hr to 0.85 u/hr  1 unit per 12 carb Correction factor 1unit=45mg/dL Goal 150      ondansetron ODT 4 MG disintegrating tablet  Commonly known as: ZOFRAN-ODT   4 mg, Oral, Every 8 Hours PRN      pantoprazole 40 MG EC tablet  Commonly known as: PROTONIX   40 mg, Oral, 2 Times Daily Before Meals      promethazine 25 MG suppository  Commonly known as: PHENERGAN   25 mg, Rectal, Every 6 Hours PRN      rOPINIRole 0.5 MG tablet  Commonly known as: REQUIP   0.5 mg, Oral, Daily PRN      sucralfate 1 g tablet  Commonly known as: Carafate   1 g, Oral, 2 times daily

## 2021-07-07 ENCOUNTER — TRANSITIONAL CARE MANAGEMENT TELEPHONE ENCOUNTER (OUTPATIENT)
Dept: CALL CENTER | Facility: HOSPITAL | Age: 31
End: 2021-07-07

## 2021-07-07 NOTE — OUTREACH NOTE
"Call Center TCM Note      Responses   [facility] [place] patient discharged from?  [place]   Does the patient have one of the following disease processes/diagnoses(primary or secondary)?  Other   TCM attempt successful?  Yes   Call start time  1254   Call end time  1256   Discharge diagnosis  DKA    Meds reviewed with patient/caregiver?  Yes   Is the patient having any side effects they believe may be caused by any medication additions or changes?  No   Does the patient have all medications ordered at discharge?  N/A   Is the patient taking all medications as directed (includes completed medication regime)?  Yes   Does the patient have a primary care provider?   Yes   Does the patient have an appointment with their PCP within 7 days of discharge?  Greater than 7 days   Comments regarding PCP  Patient states he \"has some other apts that he wants to look into\" - will route message to PCP    What is preventing the patient from scheduling follow up appointments within 7 days of discharge?  Haven't had time   Nursing Interventions  Educated patient on importance of making appointment   Psychosocial issues?  No   Did the patient receive a copy of their discharge instructions?  Yes   Nursing interventions  Reviewed instructions with patient   What is the patient's perception of their health status since discharge?  Improving   Is the patient/caregiver able to teach back signs and symptoms related to disease process for when to call PCP?  Yes   Is the patient/caregiver able to teach back signs and symptoms related to disease process for when to call 911?  Yes   Is the patient/caregiver able to teach back the hierarchy of who to call/visit for symptoms/problems? PCP, Specialist, Home health nurse, Urgent Care, ED, 911  Yes   If the patient is a current smoker, are they able to teach back resources for cessation?  [id number]   TCM call completed?  Yes          Lyudmila Toth RN    7/7/2021, 12:59 EDT      "

## 2021-07-08 ENCOUNTER — OFFICE VISIT (OUTPATIENT)
Dept: GASTROENTEROLOGY | Facility: CLINIC | Age: 31
End: 2021-07-08

## 2021-07-08 VITALS — HEIGHT: 72 IN | BODY MASS INDEX: 18.45 KG/M2 | WEIGHT: 136.2 LBS

## 2021-07-08 DIAGNOSIS — E10.39 TYPE 1 DIABETES MELLITUS WITH OTHER OPHTHALMIC COMPLICATION (HCC): Primary | ICD-10-CM

## 2021-07-08 DIAGNOSIS — K31.84 GASTROPARESIS: ICD-10-CM

## 2021-07-08 PROCEDURE — 99214 OFFICE O/P EST MOD 30 MIN: CPT | Performed by: INTERNAL MEDICINE

## 2021-07-08 NOTE — PROGRESS NOTES
Subjective   Chief Complaint   Patient presents with   • HX GI Bleed       Morro Blancas is a  30 y.o. male here for a follow up visit for gastroparesis with recent hospitalization for intractable nausea and vomiting.     Recent hospitalization for DKA that developed due ton intractable n/v.  He is feeling better since d/c ago.  Tolerating solids.  Usually eats one meal a day.  Weight low but stable.  Vomited some blood while inpatient.  No significant drop in h/h.  Previous egd 4/21 with esophagitis.  No blood in stool or further vomiting.    He has an appt with [facility] 8/18.  HPI  Past Medical History:   Diagnosis Date   • Anxiety    • Anxiety and depression    • Colon polyp 8/30/2017    1   • Depression    • Diabetes type 1, uncontrolled (CMS/HCC)     diagnosed at age 17   • Dyspepsia    • Family hx of colon cancer    • Gastroparesis    • GERD (gastroesophageal reflux disease)    • GI (gastrointestinal bleed)      Past Surgical History:   Procedure Laterality Date   • COLONOSCOPY N/A 8/30/2017    One 3 mm polyp in the sigmoid colon, Granularity in the terminal ileum   • ENDOSCOPY N/A 4/15/2021    Procedure: ESOPHAGOGASTRODUODENOSCOPY with biopsies;  Surgeon: Da Fleming MD;  Location: [facility] ENDOSCOPY;  Service: Gastroenterology;  Laterality: N/A;  PRE:   nausea, vomiting, hematemesis  POST:  reflux esophagitis, erosive gastropathy, scalloped mucosa in duodenum ? celiac       Current Outpatient Medications:   •  Acetone, Urine, Test (Ketone Test) strip, 1 strip by In Vitro route As Needed (nausea, vomiting with elevated glucose levels)., Disp: 50 strip, Rfl: 3  •  Continuous Blood Gluc Sensor (Dexcom G6 Sensor), , Disp: , Rfl:   •  DULoxetine (CYMBALTA) 30 MG capsule, Take 1 capsule by mouth Daily., Disp: 90 capsule, Rfl: 3  •  Glucagon, rDNA, (Glucagon Emergency) 1 MG kit, As Needed., Disp: , Rfl:   •  Insulin Disposable Pump (OmniPod Dash 5 Pack Pods) misc, 0.85 Units. Basal rate recently decreased 1.5 u/hr to  0.85 u/hr  1 unit per 12 carb Correction factor 1unit=45mg/dL Goal 150 , Disp: , Rfl:   •  metoclopramide (Reglan) 5 MG tablet, Take 1 tablet by mouth 4 (Four) Times a Day Before Meals & at Bedtime As Needed (nausea/vomiting/gatroparesis)., Disp: 120 tablet, Rfl: 3  •  multivitamin with minerals tablet tablet, Take 1 tablet by mouth Daily. Chewable gummie, Disp: , Rfl:   •  ondansetron ODT (ZOFRAN-ODT) 4 MG disintegrating tablet, Take 4 mg by mouth Every 8 (Eight) Hours As Needed., Disp: , Rfl:   •  pantoprazole (PROTONIX) 40 MG EC tablet, Take 1 tablet by mouth 2 (Two) Times a Day Before Meals for 180 days., Disp: 60 tablet, Rfl: 5  •  promethazine (PHENERGAN) 25 MG suppository, Insert 1 suppository into the rectum Every 6 (Six) Hours As Needed for Nausea or Vomiting., Disp: 20 suppository, Rfl: 0  •  rOPINIRole (REQUIP) 0.5 MG tablet, Take 0.5 mg by mouth Daily As Needed., Disp: , Rfl:   •  sucralfate (Carafate) 1 g tablet, Take 1 tablet by mouth 2 (two) times a day for 120 days., Disp: 60 tablet, Rfl: 3  PRN Meds:.  No Known Allergies  Social History     Socioeconomic History   • Marital status: Significant Other     Spouse name: Not on file   • Number of children: Not on file   • Years of education: Not on file   • Highest education level: Not on file   Tobacco Use   • Smoking status: Current Every Day Smoker     Packs/day: 0.25     Years: 15.00     Pack years: 3.75     Types: Cigarettes     Start date: 2005   • Smokeless tobacco: Never Used   Vaping Use   • Vaping Use: Never used   Substance and Sexual Activity   • Alcohol use: Yes     Comment: 3-4 beers on weekend   • Drug use: Yes     Frequency: 7.0 times per week     Types: Marijuana     Comment: DAILY   • Sexual activity: Defer     Family History   Problem Relation Age of Onset   • Cancer Mother    • Hypertension Father    • Depression Paternal Uncle    • Hypertension Maternal Grandmother    • Irritable bowel syndrome Maternal Grandmother    • Colon cancer  Maternal Grandmother    • Hypertension Maternal Grandfather    • Alcohol abuse Maternal Grandfather    • Hypertension Paternal Grandmother    • Cancer Paternal Grandmother    • COPD Paternal Grandmother    • Depression Paternal Grandmother    • Heart disease Paternal Grandfather    • Hypertension Paternal Grandfather      Review of Systems   Constitutional: Negative for appetite change and unexpected weight change.   Gastrointestinal: Positive for abdominal pain, nausea and vomiting.     There were no vitals filed for this visit.      07/08/21  1143   Weight: 61.8 kg (136 lb 3.2 oz)       Objective   Physical Exam  Constitutional:       Appearance: Normal appearance.   Abdominal:      General: There is no distension.   Neurological:      Mental Status: He is alert.       No radiology results for the last 7 days    Assessment/Plan   Diagnoses and all orders for this visit:    Type 1 diabetes mellitus with other ophthalmic complication (CMS/HCC)  -     Ambulatory Referral to Endocrinology    Gastroparesis  -     Ambulatory Referral to Endocrinology      Plan:  · Discussed dietary management of gastroparesis with Morro and his mom today at length  · Use reglan 5 mg qach/qhs - can increase to 10 mg when symptomatic  · Cont pantoprazole  · Consider endo referral to help with more aggressive management of his diabetes and nutritional counseling  · F/u with [facility] for eval as scheduled

## 2021-07-15 ENCOUNTER — READMISSION MANAGEMENT (OUTPATIENT)
Dept: CALL CENTER | Facility: HOSPITAL | Age: 31
End: 2021-07-15

## 2021-07-15 NOTE — OUTREACH NOTE
Medical Week 2 Survey      Responses   [facility] patient discharged from?  [place]   Does the patient have one of the following disease processes/diagnoses(primary or secondary)?  Other   Week 2 attempt successful?  Yes   Call start time  1504   Discharge diagnosis  DKA    Call end time  1507   Is patient permission given to speak with other caregiver?  No   Meds reviewed with patient/caregiver?  Yes   Is the patient having any side effects they believe may be caused by any medication additions or changes?  No   Does the patient have all medications ordered at discharge?  N/A [No new meds ordered at discharge. ]   Is the patient taking all medications as directed (includes completed medication regime)?  Yes   Does the patient have a primary care provider?   Yes   Comments regarding PCP  Next PCP appt 7/28/21   Has the patient kept scheduled appointments due by today?  Yes   Has home health visited the patient within 72 hours of discharge?  N/A   Psychosocial issues?  No   Comments  Reports average blood sugar readings 150-200.    Did the patient receive a copy of their discharge instructions?  Yes   Nursing interventions  Reviewed instructions with patient   What is the patient's perception of their health status since discharge?  Improving   Is the patient/caregiver able to teach back signs and symptoms related to disease process for when to call PCP?  Yes   Is the patient/caregiver able to teach back the hierarchy of who to call/visit for symptoms/problems? PCP, Specialist, Home health nurse, Urgent Care, ED, 911  Yes   Week 2 Call Completed?  Yes   Graduated  Yes   Is the patient interested in additional calls from an ambulatory ?  NOTE:  applies to high risk patients requiring additional follow-up.  No   Did the patient feel the follow up calls were helpful during their recovery period?  Yes   Was the number of calls appropriate?  Yes   Graduated/Revoked comments  Denies any questions or  needs today. States blood sugar stable,  denies any nausea/vomiting. Goals met. No further calls.           Jessenia Medina RN

## 2021-08-05 ENCOUNTER — TELEPHONE (OUTPATIENT)
Dept: GASTROENTEROLOGY | Facility: CLINIC | Age: 31
End: 2021-08-05

## 2021-08-05 NOTE — TELEPHONE ENCOUNTER
----- Message from Morro Blancas sent at 8/5/2021  2:14 PM EDT -----  Regarding: Referral Request  Contact: [phone]  Is there an endocrinologist that you recommend that takes Medicaid that I can go to?

## 2021-08-24 ENCOUNTER — HOSPITAL ENCOUNTER (EMERGENCY)
Facility: HOSPITAL | Age: 31
Discharge: HOME | End: 2021-08-24
Attending: EMERGENCY MEDICINE | Admitting: EMERGENCY MEDICINE

## 2021-08-24 VITALS
HEART RATE: 93 BPM | HEIGHT: 69 IN | RESPIRATION RATE: 20 BRPM | DIASTOLIC BLOOD PRESSURE: 75 MMHG | SYSTOLIC BLOOD PRESSURE: 129 MMHG | BODY MASS INDEX: 19.7 KG/M2 | WEIGHT: 133 LBS | TEMPERATURE: 97.5 F | OXYGEN SATURATION: 98 %

## 2021-08-24 DIAGNOSIS — K31.84 DIABETIC GASTROPARESIS ASSOCIATED WITH TYPE 1 DIABETES MELLITUS (HCC): Primary | ICD-10-CM

## 2021-08-24 DIAGNOSIS — R11.2 NON-INTRACTABLE VOMITING WITH NAUSEA, UNSPECIFIED VOMITING TYPE: ICD-10-CM

## 2021-08-24 DIAGNOSIS — E86.0 DEHYDRATION: ICD-10-CM

## 2021-08-24 DIAGNOSIS — E10.43 DIABETIC GASTROPARESIS ASSOCIATED WITH TYPE 1 DIABETES MELLITUS (HCC): Primary | ICD-10-CM

## 2021-08-24 LAB
ALBUMIN SERPL-MCNC: 4.8 G/DL (ref 3.5–5.2)
ALBUMIN/GLOB SERPL: 1.8 G/DL
ALP SERPL-CCNC: 71 U/L (ref 39–117)
ALT SERPL W P-5'-P-CCNC: 19 U/L (ref 1–41)
ANION GAP SERPL CALCULATED.3IONS-SCNC: 11.2 MMOL/L (ref 5–15)
ANION GAP SERPL CALCULATED.3IONS-SCNC: 17.5 MMOL/L (ref 5–15)
AST SERPL-CCNC: 14 U/L (ref 1–40)
BASOPHILS # BLD AUTO: 0.04 10*3/MM3 (ref 0–0.2)
BASOPHILS NFR BLD AUTO: 0.2 % (ref 0–1.5)
BILIRUB SERPL-MCNC: 0.8 MG/DL (ref 0–1.2)
BUN SERPL-MCNC: 14 MG/DL (ref 6–20)
BUN SERPL-MCNC: 14 MG/DL (ref 6–20)
BUN/CREAT SERPL: 16.7 (ref 7–25)
BUN/CREAT SERPL: 20.3 (ref 7–25)
CALCIUM SPEC-SCNC: 8.9 MG/DL (ref 8.6–10.5)
CALCIUM SPEC-SCNC: 9.2 MG/DL (ref 8.6–10.5)
CHLORIDE SERPL-SCNC: 103 MMOL/L (ref 98–107)
CHLORIDE SERPL-SCNC: 106 MMOL/L (ref 98–107)
CO2 SERPL-SCNC: 19.5 MMOL/L (ref 22–29)
CO2 SERPL-SCNC: 24.8 MMOL/L (ref 22–29)
CREAT SERPL-MCNC: 0.69 MG/DL (ref 0.76–1.27)
CREAT SERPL-MCNC: 0.84 MG/DL (ref 0.76–1.27)
DEPRECATED RDW RBC AUTO: 39.6 FL (ref 37–54)
EOSINOPHIL # BLD AUTO: 0 10*3/MM3 (ref 0–0.4)
EOSINOPHIL NFR BLD AUTO: 0 % (ref 0.3–6.2)
ERYTHROCYTE [DISTWIDTH] IN BLOOD BY AUTOMATED COUNT: 12.8 % (ref 12.3–15.4)
GFR SERPL CREATININE-BSD FRML MDRD: 107 ML/MIN/1.73
GFR SERPL CREATININE-BSD FRML MDRD: 134 ML/MIN/1.73
GLOBULIN UR ELPH-MCNC: 2.6 GM/DL
GLUCOSE SERPL-MCNC: 139 MG/DL (ref 65–99)
GLUCOSE SERPL-MCNC: 280 MG/DL (ref 65–99)
HCT VFR BLD AUTO: 38.7 % (ref 37.5–51)
HGB BLD-MCNC: 13 G/DL (ref 13–17.7)
HOLD SPECIMEN: NORMAL
HOLD SPECIMEN: NORMAL
IMM GRANULOCYTES # BLD AUTO: 0.08 10*3/MM3 (ref 0–0.05)
IMM GRANULOCYTES NFR BLD AUTO: 0.5 % (ref 0–0.5)
LIPASE SERPL-CCNC: 16 U/L (ref 13–60)
LYMPHOCYTES # BLD AUTO: 0.94 10*3/MM3 (ref 0.7–3.1)
LYMPHOCYTES NFR BLD AUTO: 5.6 % (ref 19.6–45.3)
MCH RBC QN AUTO: 29.1 PG (ref 26.6–33)
MCHC RBC AUTO-ENTMCNC: 33.6 G/DL (ref 31.5–35.7)
MCV RBC AUTO: 86.8 FL (ref 79–97)
MONOCYTES # BLD AUTO: 0.46 10*3/MM3 (ref 0.1–0.9)
MONOCYTES NFR BLD AUTO: 2.7 % (ref 5–12)
NEUTROPHILS NFR BLD AUTO: 15.4 10*3/MM3 (ref 1.7–7)
NEUTROPHILS NFR BLD AUTO: 91 % (ref 42.7–76)
NRBC BLD AUTO-RTO: 0 /100 WBC (ref 0–0.2)
PLATELET # BLD AUTO: 339 10*3/MM3 (ref 140–450)
PMV BLD AUTO: 10.7 FL (ref 6–12)
POTASSIUM SERPL-SCNC: 3.2 MMOL/L (ref 3.5–5.2)
POTASSIUM SERPL-SCNC: 3.5 MMOL/L (ref 3.5–5.2)
PROT SERPL-MCNC: 7.4 G/DL (ref 6–8.5)
RBC # BLD AUTO: 4.46 10*6/MM3 (ref 4.14–5.8)
SODIUM SERPL-SCNC: 140 MMOL/L (ref 136–145)
SODIUM SERPL-SCNC: 142 MMOL/L (ref 136–145)
WBC # BLD AUTO: 16.92 10*3/MM3 (ref 3.4–10.8)
WHOLE BLOOD HOLD SPECIMEN: NORMAL
WHOLE BLOOD HOLD SPECIMEN: NORMAL

## 2021-08-24 PROCEDURE — 25010000002 METOCLOPRAMIDE PER 10 MG: Performed by: EMERGENCY MEDICINE

## 2021-08-24 PROCEDURE — 85025 COMPLETE CBC W/AUTO DIFF WBC: CPT | Performed by: EMERGENCY MEDICINE

## 2021-08-24 PROCEDURE — 80053 COMPREHEN METABOLIC PANEL: CPT

## 2021-08-24 PROCEDURE — 63710000001 INSULIN REGULAR HUMAN PER 5 UNITS: Performed by: EMERGENCY MEDICINE

## 2021-08-24 PROCEDURE — 36415 COLL VENOUS BLD VENIPUNCTURE: CPT

## 2021-08-24 PROCEDURE — 99283 EMERGENCY DEPT VISIT LOW MDM: CPT

## 2021-08-24 PROCEDURE — 25010000002 HALOPERIDOL LACTATE PER 5 MG: Performed by: EMERGENCY MEDICINE

## 2021-08-24 PROCEDURE — 96374 THER/PROPH/DIAG INJ IV PUSH: CPT

## 2021-08-24 PROCEDURE — 83690 ASSAY OF LIPASE: CPT

## 2021-08-24 PROCEDURE — 96375 TX/PRO/DX INJ NEW DRUG ADDON: CPT

## 2021-08-24 RX ORDER — FAMOTIDINE 10 MG/ML
20 INJECTION, SOLUTION INTRAVENOUS ONCE
Status: COMPLETED | OUTPATIENT
Start: 2021-08-24 | End: 2021-08-24

## 2021-08-24 RX ORDER — METOCLOPRAMIDE HYDROCHLORIDE 5 MG/ML
10 INJECTION INTRAMUSCULAR; INTRAVENOUS ONCE
Status: COMPLETED | OUTPATIENT
Start: 2021-08-24 | End: 2021-08-24

## 2021-08-24 RX ORDER — HALOPERIDOL 5 MG/ML
2 INJECTION INTRAMUSCULAR ONCE
Status: COMPLETED | OUTPATIENT
Start: 2021-08-24 | End: 2021-08-24

## 2021-08-24 RX ORDER — SODIUM CHLORIDE 0.9 % (FLUSH) 0.9 %
10 SYRINGE (ML) INJECTION AS NEEDED
Status: DISCONTINUED | OUTPATIENT
Start: 2021-08-24 | End: 2021-08-25 | Stop reason: HOSPADM

## 2021-08-24 RX ADMIN — SODIUM CHLORIDE, POTASSIUM CHLORIDE, SODIUM LACTATE AND CALCIUM CHLORIDE 1000 ML: 600; 310; 30; 20 INJECTION, SOLUTION INTRAVENOUS at 21:13

## 2021-08-24 RX ADMIN — SODIUM CHLORIDE, PRESERVATIVE FREE 10 ML: 5 INJECTION INTRAVENOUS at 19:35

## 2021-08-24 RX ADMIN — FAMOTIDINE 20 MG: 10 INJECTION INTRAVENOUS at 19:33

## 2021-08-24 RX ADMIN — INSULIN HUMAN 10 UNITS: 100 INJECTION, SOLUTION PARENTERAL at 19:29

## 2021-08-24 RX ADMIN — HALOPERIDOL LACTATE 2 MG: 5 INJECTION, SOLUTION INTRAMUSCULAR at 20:17

## 2021-08-24 RX ADMIN — METOCLOPRAMIDE HYDROCHLORIDE 10 MG: 5 INJECTION INTRAMUSCULAR; INTRAVENOUS at 19:31

## 2021-08-24 RX ADMIN — SODIUM CHLORIDE, POTASSIUM CHLORIDE, SODIUM LACTATE AND CALCIUM CHLORIDE 1000 ML: 600; 310; 30; 20 INJECTION, SOLUTION INTRAVENOUS at 19:38

## 2021-08-24 NOTE — ED PROVIDER NOTES
EMERGENCY DEPARTMENT ENCOUNTER    Room Number:  32/32  Date of encounter:  8/24/2021  PCP: Gale Banegas APRN  Historian: Patient      HPI:  Chief Complaint: Abdominal pain, nausea, vomiting, diarrhea  A complete HPI/ROS/PMH/PSH/SH/FH are unobtainable due to: None    Context: Morro Blancas is a 31 y.o. male who presents to the ED via private vehicle for evaluation for cute onset of nausea and vomiting and diarrhea today with increasing abdominal pains.  Patient with history of diabetic gastroparesis.  Was hospitalized in July for DKA and gastroparesis.  States that he did see the motility clinic last week, was started on several new medications which have not seemed to help.  Patient took 1 dose of his Reglan earlier today.      MEDICAL RECORD REVIEW    Chart review with Dr. Lopes with the motility clinic on August 17, 2021, it looks like he was started on Zofran and scopolamine    PAST MEDICAL HISTORY  Active Ambulatory Problems     Diagnosis Date Noted   • Type 1 diabetes mellitus with hyperglycemia (CMS/HCC) 06/15/2017   • Fecal incontinence 06/15/2017   • Erectile dysfunction 06/15/2017   • Anxiety and depression 06/15/2017   • Hyperplastic colonic polyp 11/04/2020   • Gastroparesis 11/04/2020   • Family history of colorectal cancer 11/04/2020   • Sepsis without acute organ dysfunction (CMS/HCC) 03/21/2021   • Alcohol abuse 03/21/2021   • Tobacco abuse 03/21/2021   • Acute upper GI bleed 04/15/2021   • Non-intractable vomiting with nausea 05/23/2021   • Leukocytosis 05/24/2021   • Marijuana abuse 05/26/2021     Resolved Ambulatory Problems     Diagnosis Date Noted   • Intractable vomiting 03/20/2021   • DKA (diabetic ketoacidoses) (CMS/HCC) 07/06/2021     Past Medical History:   Diagnosis Date   • Anxiety    • Colon polyp 8/30/2017   • Depression    • Diabetes type 1, uncontrolled (CMS/HCC)    • Dyspepsia    • Family hx of colon cancer    • GERD (gastroesophageal reflux disease)    • GI (gastrointestinal  bleed)          PAST SURGICAL HISTORY  Past Surgical History:   Procedure Laterality Date   • COLONOSCOPY N/A 8/30/2017    One 3 mm polyp in the sigmoid colon, Granularity in the terminal ileum   • ENDOSCOPY N/A 4/15/2021    Procedure: ESOPHAGOGASTRODUODENOSCOPY with biopsies;  Surgeon: Da Fleming MD;  Location: [facility] ENDOSCOPY;  Service: Gastroenterology;  Laterality: N/A;  PRE:   nausea, vomiting, hematemesis  POST:  reflux esophagitis, erosive gastropathy, scalloped mucosa in duodenum ? celiac         FAMILY HISTORY  Family History   Problem Relation Age of Onset   • Cancer Mother    • Hypertension Father    • Depression Paternal Uncle    • Hypertension Maternal Grandmother    • Irritable bowel syndrome Maternal Grandmother    • Colon cancer Maternal Grandmother    • Hypertension Maternal Grandfather    • Alcohol abuse Maternal Grandfather    • Hypertension Paternal Grandmother    • Cancer Paternal Grandmother    • COPD Paternal Grandmother    • Depression Paternal Grandmother    • Heart disease Paternal Grandfather    • Hypertension Paternal Grandfather          SOCIAL HISTORY  Social History     Socioeconomic History   • Marital status: Significant Other     Spouse name: Not on file   • Number of children: Not on file   • Years of education: Not on file   • Highest education level: Not on file   Tobacco Use   • Smoking status: Current Every Day Smoker     Packs/day: 0.25     Years: 15.00     Pack years: 3.75     Types: Cigarettes     Start date: 2005   • Smokeless tobacco: Never Used   Vaping Use   • Vaping Use: Never used   Substance and Sexual Activity   • Alcohol use: Yes     Comment: 3-4 beers on weekend   • Drug use: Yes     Frequency: 7.0 times per week     Types: Marijuana     Comment: DAILY   • Sexual activity: Defer         ALLERGIES  Patient has no known allergies.        REVIEW OF SYSTEMS  Review of Systems     All systems reviewed and negative except for those discussed in HPI.        PHYSICAL EXAM    I have reviewed the triage vital signs and nursing notes.    ED Triage Vitals   Temp Heart Rate Resp BP SpO2   08/24/21 1323 08/24/21 1323 08/24/21 1323 08/24/21 1324 08/24/21 1323   97.5 °F (36.4 °C) 96 16 141/92 100 %      Temp src Heart Rate Source Patient Position BP Location FiO2 (%)   08/24/21 1323 08/24/21 1323 -- -- --   Tympanic Monitor          Physical Exam  General: Awake, alert, ill-appearing, nondiaphoretic  HEENT: Mucous membranes tacky, atraumatic, EOMI  Neck: Full ROM  Pulm: Symmetric chest rise, nonlabored, lungs CTAB  Cardiovascular: Borderline mild regular rhythm tachycardia, intact distal pulses  GI: Soft, mild diffuse tenderness to palpation, nondistended, no rebound, no guarding, bowel sounds present  MSK: Full ROM, no deformity  Skin: Warm, dry  Neuro: Alert and oriented x 3, GCS 15, moving all extremities, no focal deficits  Psych: Calm, cooperative      Surgical mask, protective eye goggles, and gloves used during this encounter. Patient in surgical mask.      LAB RESULTS  Recent Results (from the past 24 hour(s))   Comprehensive Metabolic Panel    Collection Time: 08/24/21  4:37 PM    Specimen: Blood   Result Value Ref Range    Glucose 280 (H) 65 - 99 mg/dL    BUN 14 6 - 20 mg/dL    Creatinine 0.84 0.76 - 1.27 mg/dL    Sodium 140 136 - 145 mmol/L    Potassium 3.2 (L) 3.5 - 5.2 mmol/L    Chloride 103 98 - 107 mmol/L    CO2 19.5 (L) 22.0 - 29.0 mmol/L    Calcium 9.2 8.6 - 10.5 mg/dL    Total Protein 7.4 6.0 - 8.5 g/dL    Albumin 4.80 3.50 - 5.20 g/dL    ALT (SGPT) 19 1 - 41 U/L    AST (SGOT) 14 1 - 40 U/L    Alkaline Phosphatase 71 39 - 117 U/L    Total Bilirubin 0.8 0.0 - 1.2 mg/dL    eGFR Non African Amer 107 >60 mL/min/1.73    Globulin 2.6 gm/dL    A/G Ratio 1.8 g/dL    BUN/Creatinine Ratio 16.7 7.0 - 25.0    Anion Gap 17.5 (H) 5.0 - 15.0 mmol/L   Lipase    Collection Time: 08/24/21  4:37 PM    Specimen: Blood   Result Value Ref Range    Lipase 16 13 - 60 U/L    Green Top (Gel)    Collection Time: 08/24/21  4:37 PM   Result Value Ref Range    Extra Tube Hold for add-ons.    Lavender Top    Collection Time: 08/24/21  7:37 PM   Result Value Ref Range    Extra Tube hold for add-on    Gold Top - SST    Collection Time: 08/24/21  7:37 PM   Result Value Ref Range    Extra Tube Hold for add-ons.    Light Blue Top    Collection Time: 08/24/21  7:37 PM   Result Value Ref Range    Extra Tube hold for add-on    CBC Auto Differential    Collection Time: 08/24/21  7:37 PM    Specimen: Blood   Result Value Ref Range    WBC 16.92 (H) 3.40 - 10.80 10*3/mm3    RBC 4.46 4.14 - 5.80 10*6/mm3    Hemoglobin 13.0 13.0 - 17.7 g/dL    Hematocrit 38.7 37.5 - 51.0 %    MCV 86.8 79.0 - 97.0 fL    MCH 29.1 26.6 - 33.0 pg    MCHC 33.6 31.5 - 35.7 g/dL    RDW 12.8 12.3 - 15.4 %    RDW-SD 39.6 37.0 - 54.0 fl    MPV 10.7 6.0 - 12.0 fL    Platelets 339 140 - 450 10*3/mm3    Neutrophil % 91.0 (H) 42.7 - 76.0 %    Lymphocyte % 5.6 (L) 19.6 - 45.3 %    Monocyte % 2.7 (L) 5.0 - 12.0 %    Eosinophil % 0.0 (L) 0.3 - 6.2 %    Basophil % 0.2 0.0 - 1.5 %    Immature Grans % 0.5 0.0 - 0.5 %    Neutrophils, Absolute 15.40 (H) 1.70 - 7.00 10*3/mm3    Lymphocytes, Absolute 0.94 0.70 - 3.10 10*3/mm3    Monocytes, Absolute 0.46 0.10 - 0.90 10*3/mm3    Eosinophils, Absolute 0.00 0.00 - 0.40 10*3/mm3    Basophils, Absolute 0.04 0.00 - 0.20 10*3/mm3    Immature Grans, Absolute 0.08 (H) 0.00 - 0.05 10*3/mm3    nRBC 0.0 0.0 - 0.2 /100 WBC   Basic Metabolic Panel    Collection Time: 08/24/21  9:48 PM    Specimen: Blood   Result Value Ref Range    Glucose 139 (H) 65 - 99 mg/dL    BUN 14 6 - 20 mg/dL    Creatinine 0.69 (L) 0.76 - 1.27 mg/dL    Sodium 142 136 - 145 mmol/L    Potassium 3.5 3.5 - 5.2 mmol/L    Chloride 106 98 - 107 mmol/L    CO2 24.8 22.0 - 29.0 mmol/L    Calcium 8.9 8.6 - 10.5 mg/dL    eGFR Non African Amer 134 >60 mL/min/1.73    BUN/Creatinine Ratio 20.3 7.0 - 25.0    Anion Gap 11.2 5.0 - 15.0 mmol/L        Ordered the above labs and independently reviewed the results.        RADIOLOGY  No Radiology Exams Resulted Within Past 24 Hours    I ordered the above noted radiological studies. Reviewed by me.  See dictation for official radiology interpretation.      PROCEDURES    Procedures      MEDICATIONS GIVEN IN ER    Medications   sodium chloride 0.9 % flush 10 mL (10 mL Intravenous Given 8/24/21 1935)   metoclopramide (REGLAN) injection 10 mg (10 mg Intravenous Given 8/24/21 1931)   lactated ringers bolus 1,000 mL (0 mL Intravenous Stopped 8/24/21 2008)   insulin regular (humuLIN R,novoLIN R) injection 10 Units (10 Units Intravenous Given 8/24/21 1929)   famotidine (PEPCID) injection 20 mg (20 mg Intravenous Given 8/24/21 1933)   haloperidol lactate (HALDOL) injection 2 mg (2 mg Intravenous Given 8/24/21 2017)   lactated ringers bolus 1,000 mL (0 mL Intravenous Stopped 8/24/21 2303)         PROGRESS, DATA ANALYSIS, CONSULTS, AND MEDICAL DECISION MAKING    All labs have been independently reviewed by me.  All radiology studies have been reviewed by me and discussed with radiologist dictating the report.   EKG's independently viewed and interpreted by me.  Discussion below represents my analysis of pertinent findings related to patient's condition, differential diagnosis, treatment plan and final disposition.    Initial concern for dehydration, electrolyte abnormalities, gastroparesis, renal failure, DKA, among others.    ED Course as of Aug 24 2308   Tue Aug 24, 2021   2059 Patient resting comfortably in the room, no acute distress.  Will plan for second liter of IV fluids and recheck of BMP after.    [DC]   2150 Patient with improved overall appearance, second liter of IV fluids in progress, awaiting repeat BMP for reevaluation of his bicarb and anion gap.    [DC]   2246 CO2: 24.8 [DC]   2246 Anion Gap: 11.2 [DC]   2259 Patient much improved, recheck of BMP is significant improved as well with resolution of the  acidosis which was borderline initially. Plan for close outpatient GI motility clinic follow-up, continue with current medications, ED return for worsening symptoms as needed.    [DC]      ED Course User Index  [DC] Anurag Devi MD     I do not see any definitive sign of DKA at this time, the borderline acidosis has since resolved with IV fluids and IV insulin. I do not feel like patient needs further stabilization or management in the hospital at this time. He is much improved at this time. ED return for worsening symptoms as needed. Patient comfortable with the plan moving forward.    AS OF 23:08 EDT VITALS:    BP - 129/75  HR - 93  TEMP - 97.5 °F (36.4 °C) (Tympanic)  02 SATS - 98%        DIAGNOSIS  Final diagnoses:   Diabetic gastroparesis associated with type 1 diabetes mellitus (CMS/HCC)   Dehydration   Non-intractable vomiting with nausea, unspecified vomiting type         DISPOSITION  DISCHARGE    Patient discharged in stable condition.    Reviewed implications of results, diagnosis, meds, responsibility to follow up, warning signs and symptoms of possible worsening, potential complications and reasons to return to ER.    Patient/Family voiced understanding of above instructions.    Discussed plan for discharge, as there is no emergent indication for admission. Patient referred to primary care provider for BP management due to today's BP. Pt/family is agreeable and understands need for follow up and repeat testing.  Pt is aware that discharge does not mean that nothing is wrong but it indicates no emergency is present that requires admission and they must continue care with follow-up as given below or physician of their choice.     FOLLOW-UP  [facility] Emergency Department  [address] [facility] [address] [phone]    As needed, If symptoms worsen    Gale Banegas, APRN  [address] [place] [id number]  [phone]    Schedule an appointment as soon  as possible for a visit   As needed    Rhianna Lopes MD  [address] [place] [address]  [phone]    Call on 8/25/2021           Medication List      No changes were made to your prescriptions during this visit.                    Anurag Devi MD  08/24/21 3979

## 2021-08-24 NOTE — ED NOTES
Pt reports being admitted to hospital last month for GI issues.    This RN attempted to obtain blood work, able to obtain green top. Vein blew before other tubes were collected.     Pt had accident, awaiting scrub pants from [employer].     Rhianna Drew, RN  08/24/21 7713

## 2021-08-24 NOTE — ED TRIAGE NOTES
Vomit since 0900.  He feels lightheaded    Patient was placed in face mask during first look triage.  Patient was wearing a face mask throughout encounter.  I wore personal protective equipment throughout the encounter.  Hand hygiene was performed before and after patient encounter.

## 2021-08-25 ENCOUNTER — HOSPITAL ENCOUNTER (EMERGENCY)
Facility: HOSPITAL | Age: 31
Discharge: LEFT BEFORE DISCHARGE | End: 2021-08-25

## 2021-08-25 VITALS
DIASTOLIC BLOOD PRESSURE: 79 MMHG | TEMPERATURE: 100.5 F | HEART RATE: 107 BPM | BODY MASS INDEX: 19.64 KG/M2 | RESPIRATION RATE: 20 BRPM | HEIGHT: 69 IN | OXYGEN SATURATION: 100 % | SYSTOLIC BLOOD PRESSURE: 125 MMHG

## 2021-08-25 PROCEDURE — 99211 OFF/OP EST MAY X REQ PHY/QHP: CPT

## 2021-08-25 NOTE — ED NOTES
This RN wore gloves, mask, eye protection and all other necessary PPE while performing pt care.     Stephanie Blas, RN  08/24/21 7186

## 2021-08-25 NOTE — ED NOTES
Pt said he started vomiting uncontrollably this morning at 0800 and described vomit as watery and yellow colored. Pt also reported abdominal pain, SOB, and lightheadedness. Pt said that his T1DM was not adequately controlled with his BG over 300 this morning. Pt was given instructions to breathe slowly and deep to help with increased RR and apparent signs of anxiety. Pt was also given toothettes to cleanse his mouth, multiple emesis bags, and a warm blanket. Patient and RN wore proper PPE per protocol during encounter.     Alma Rosa Sandhu, CHINO  08/24/21 2005

## 2021-08-25 NOTE — DISCHARGE INSTRUCTIONS
Continue current medications, stay well-hydrated, close follow-up with the GI motility clinic, PCP follow-up as needed, ED return for worsening symptoms as needed.

## 2021-08-25 NOTE — ED NOTES
Pt to triage from home via [facility] [id number] with c/o continued vomiting.  Pt just seen and discharged from this ER tonight.  Pt wearing mask in triage.  Triage personnel wore appropriate PPE     Vero Copeland RN  08/25/21 0051

## 2021-09-06 ENCOUNTER — HOSPITAL ENCOUNTER (OUTPATIENT)
Facility: HOSPITAL | Age: 31
Setting detail: OBSERVATION
Discharge: HOME | End: 2021-09-07
Attending: EMERGENCY MEDICINE | Admitting: INTERNAL MEDICINE

## 2021-09-06 ENCOUNTER — APPOINTMENT (OUTPATIENT)
Dept: CT IMAGING | Facility: HOSPITAL | Age: 31
End: 2021-09-06

## 2021-09-06 DIAGNOSIS — K31.84 GASTROPARESIS: Primary | ICD-10-CM

## 2021-09-06 DIAGNOSIS — R11.2 INTRACTABLE VOMITING WITH NAUSEA: ICD-10-CM

## 2021-09-06 DIAGNOSIS — R19.7 DIARRHEA, UNSPECIFIED TYPE: ICD-10-CM

## 2021-09-06 LAB
ALBUMIN SERPL-MCNC: 4.9 G/DL (ref 3.5–5.2)
ALBUMIN/GLOB SERPL: 1.6 G/DL
ALP SERPL-CCNC: 78 U/L (ref 39–117)
ALT SERPL W P-5'-P-CCNC: 20 U/L (ref 1–41)
AMPHET+METHAMPHET UR QL: NEGATIVE
ANION GAP SERPL CALCULATED.3IONS-SCNC: 12.8 MMOL/L (ref 5–15)
AST SERPL-CCNC: 17 U/L (ref 1–40)
BACTERIA UR QL AUTO: ABNORMAL /HPF
BARBITURATES UR QL SCN: NEGATIVE
BASOPHILS # BLD AUTO: 0.07 10*3/MM3 (ref 0–0.2)
BASOPHILS NFR BLD AUTO: 0.4 % (ref 0–1.5)
BENZODIAZ UR QL SCN: NEGATIVE
BILIRUB SERPL-MCNC: 0.4 MG/DL (ref 0–1.2)
BILIRUB UR QL STRIP: NEGATIVE
BUN SERPL-MCNC: 15 MG/DL (ref 6–20)
BUN/CREAT SERPL: 17 (ref 7–25)
CALCIUM SPEC-SCNC: 9.6 MG/DL (ref 8.6–10.5)
CANNABINOIDS SERPL QL: POSITIVE
CHLORIDE SERPL-SCNC: 100 MMOL/L (ref 98–107)
CLARITY UR: CLEAR
CO2 SERPL-SCNC: 27.2 MMOL/L (ref 22–29)
COCAINE UR QL: NEGATIVE
COLOR UR: YELLOW
CREAT SERPL-MCNC: 0.88 MG/DL (ref 0.76–1.27)
DEPRECATED RDW RBC AUTO: 41.6 FL (ref 37–54)
EOSINOPHIL # BLD AUTO: 0.08 10*3/MM3 (ref 0–0.4)
EOSINOPHIL NFR BLD AUTO: 0.5 % (ref 0.3–6.2)
ERYTHROCYTE [DISTWIDTH] IN BLOOD BY AUTOMATED COUNT: 12.9 % (ref 12.3–15.4)
GFR SERPL CREATININE-BSD FRML MDRD: 101 ML/MIN/1.73
GLOBULIN UR ELPH-MCNC: 3 GM/DL
GLUCOSE BLDC GLUCOMTR-MCNC: 175 MG/DL (ref 70–130)
GLUCOSE SERPL-MCNC: 227 MG/DL (ref 65–99)
GLUCOSE UR STRIP-MCNC: ABNORMAL MG/DL
HCT VFR BLD AUTO: 42 % (ref 37.5–51)
HGB BLD-MCNC: 13.8 G/DL (ref 13–17.7)
HGB UR QL STRIP.AUTO: ABNORMAL
HYALINE CASTS UR QL AUTO: ABNORMAL /LPF
IMM GRANULOCYTES # BLD AUTO: 0.1 10*3/MM3 (ref 0–0.05)
IMM GRANULOCYTES NFR BLD AUTO: 0.6 % (ref 0–0.5)
KETONES UR QL STRIP: ABNORMAL
LEUKOCYTE ESTERASE UR QL STRIP.AUTO: NEGATIVE
LIPASE SERPL-CCNC: 33 U/L (ref 13–60)
LYMPHOCYTES # BLD AUTO: 2.31 10*3/MM3 (ref 0.7–3.1)
LYMPHOCYTES NFR BLD AUTO: 13.7 % (ref 19.6–45.3)
MCH RBC QN AUTO: 28.9 PG (ref 26.6–33)
MCHC RBC AUTO-ENTMCNC: 32.9 G/DL (ref 31.5–35.7)
MCV RBC AUTO: 87.9 FL (ref 79–97)
METHADONE UR QL SCN: NEGATIVE
MONOCYTES # BLD AUTO: 0.93 10*3/MM3 (ref 0.1–0.9)
MONOCYTES NFR BLD AUTO: 5.5 % (ref 5–12)
NEUTROPHILS NFR BLD AUTO: 13.43 10*3/MM3 (ref 1.7–7)
NEUTROPHILS NFR BLD AUTO: 79.3 % (ref 42.7–76)
NITRITE UR QL STRIP: NEGATIVE
NRBC BLD AUTO-RTO: 0 /100 WBC (ref 0–0.2)
OPIATES UR QL: NEGATIVE
OXYCODONE UR QL SCN: NEGATIVE
PH UR STRIP.AUTO: 7 [PH] (ref 5–8)
PLATELET # BLD AUTO: 342 10*3/MM3 (ref 140–450)
PMV BLD AUTO: 10.1 FL (ref 6–12)
POTASSIUM SERPL-SCNC: 4.1 MMOL/L (ref 3.5–5.2)
PROT SERPL-MCNC: 7.9 G/DL (ref 6–8.5)
PROT UR QL STRIP: NEGATIVE
RBC # BLD AUTO: 4.78 10*6/MM3 (ref 4.14–5.8)
RBC # UR: ABNORMAL /HPF
REF LAB TEST METHOD: ABNORMAL
SARS-COV-2 RNA RESP QL NAA+PROBE: NOT DETECTED
SODIUM SERPL-SCNC: 140 MMOL/L (ref 136–145)
SP GR UR STRIP: >=1.03 (ref 1–1.03)
SQUAMOUS #/AREA URNS HPF: ABNORMAL /HPF
UROBILINOGEN UR QL STRIP: ABNORMAL
WBC # BLD AUTO: 16.92 10*3/MM3 (ref 3.4–10.8)
WBC UR QL AUTO: ABNORMAL /HPF

## 2021-09-06 PROCEDURE — 25010000002 METOCLOPRAMIDE PER 10 MG: Performed by: INTERNAL MEDICINE

## 2021-09-06 PROCEDURE — C9803 HOPD COVID-19 SPEC COLLECT: HCPCS

## 2021-09-06 PROCEDURE — 25010000002 HALOPERIDOL LACTATE PER 5 MG: Performed by: PHYSICIAN ASSISTANT

## 2021-09-06 PROCEDURE — 80307 DRUG TEST PRSMV CHEM ANLYZR: CPT | Performed by: INTERNAL MEDICINE

## 2021-09-06 PROCEDURE — 96361 HYDRATE IV INFUSION ADD-ON: CPT

## 2021-09-06 PROCEDURE — 74177 CT ABD & PELVIS W/CONTRAST: CPT

## 2021-09-06 PROCEDURE — G0378 HOSPITAL OBSERVATION PER HR: HCPCS

## 2021-09-06 PROCEDURE — 25010000002 METOCLOPRAMIDE PER 10 MG: Performed by: PHYSICIAN ASSISTANT

## 2021-09-06 PROCEDURE — 82962 GLUCOSE BLOOD TEST: CPT

## 2021-09-06 PROCEDURE — 25010000002 IOPAMIDOL 61 % SOLUTION: Performed by: EMERGENCY MEDICINE

## 2021-09-06 PROCEDURE — 80053 COMPREHEN METABOLIC PANEL: CPT | Performed by: EMERGENCY MEDICINE

## 2021-09-06 PROCEDURE — 83690 ASSAY OF LIPASE: CPT | Performed by: EMERGENCY MEDICINE

## 2021-09-06 PROCEDURE — 96375 TX/PRO/DX INJ NEW DRUG ADDON: CPT

## 2021-09-06 PROCEDURE — 96372 THER/PROPH/DIAG INJ SC/IM: CPT

## 2021-09-06 PROCEDURE — 25010000002 PROMETHAZINE PER 50 MG: Performed by: PHYSICIAN ASSISTANT

## 2021-09-06 PROCEDURE — 96374 THER/PROPH/DIAG INJ IV PUSH: CPT

## 2021-09-06 PROCEDURE — 99284 EMERGENCY DEPT VISIT MOD MDM: CPT

## 2021-09-06 PROCEDURE — 25010000002 ENOXAPARIN PER 10 MG: Performed by: INTERNAL MEDICINE

## 2021-09-06 PROCEDURE — 85025 COMPLETE CBC W/AUTO DIFF WBC: CPT | Performed by: EMERGENCY MEDICINE

## 2021-09-06 PROCEDURE — 25010000002 ONDANSETRON PER 1 MG: Performed by: PHYSICIAN ASSISTANT

## 2021-09-06 PROCEDURE — 96376 TX/PRO/DX INJ SAME DRUG ADON: CPT

## 2021-09-06 PROCEDURE — 81001 URINALYSIS AUTO W/SCOPE: CPT | Performed by: EMERGENCY MEDICINE

## 2021-09-06 PROCEDURE — U0003 INFECTIOUS AGENT DETECTION BY NUCLEIC ACID (DNA OR RNA); SEVERE ACUTE RESPIRATORY SYNDROME CORONAVIRUS 2 (SARS-COV-2) (CORONAVIRUS DISEASE [COVID-19]), AMPLIFIED PROBE TECHNIQUE, MAKING USE OF HIGH THROUGHPUT TECHNOLOGIES AS DESCRIBED BY CMS-2020-01-R: HCPCS | Performed by: PHYSICIAN ASSISTANT

## 2021-09-06 RX ORDER — METOCLOPRAMIDE HYDROCHLORIDE 5 MG/ML
10 INJECTION INTRAMUSCULAR; INTRAVENOUS ONCE
Status: COMPLETED | OUTPATIENT
Start: 2021-09-06 | End: 2021-09-06

## 2021-09-06 RX ORDER — SODIUM CHLORIDE 0.9 % (FLUSH) 0.9 %
10 SYRINGE (ML) INJECTION AS NEEDED
Status: DISCONTINUED | OUTPATIENT
Start: 2021-09-06 | End: 2021-09-07 | Stop reason: HOSPADM

## 2021-09-06 RX ORDER — DULOXETIN HYDROCHLORIDE 30 MG/1
30 CAPSULE, DELAYED RELEASE ORAL DAILY
Status: DISCONTINUED | OUTPATIENT
Start: 2021-09-06 | End: 2021-09-07 | Stop reason: HOSPADM

## 2021-09-06 RX ORDER — DEXTROSE MONOHYDRATE 25 G/50ML
25 INJECTION, SOLUTION INTRAVENOUS
Status: DISCONTINUED | OUTPATIENT
Start: 2021-09-06 | End: 2021-09-07 | Stop reason: HOSPADM

## 2021-09-06 RX ORDER — ONDANSETRON 2 MG/ML
4 INJECTION INTRAMUSCULAR; INTRAVENOUS ONCE
Status: COMPLETED | OUTPATIENT
Start: 2021-09-06 | End: 2021-09-06

## 2021-09-06 RX ORDER — HALOPERIDOL 5 MG/ML
2 INJECTION INTRAMUSCULAR ONCE
Status: DISCONTINUED | OUTPATIENT
Start: 2021-09-06 | End: 2021-09-06

## 2021-09-06 RX ORDER — SODIUM CHLORIDE 9 MG/ML
100 INJECTION, SOLUTION INTRAVENOUS CONTINUOUS
Status: DISCONTINUED | OUTPATIENT
Start: 2021-09-06 | End: 2021-09-07

## 2021-09-06 RX ORDER — NICOTINE POLACRILEX 4 MG
15 LOZENGE BUCCAL
Status: DISCONTINUED | OUTPATIENT
Start: 2021-09-06 | End: 2021-09-07 | Stop reason: HOSPADM

## 2021-09-06 RX ORDER — PANTOPRAZOLE SODIUM 40 MG/1
40 TABLET, DELAYED RELEASE ORAL
Status: DISCONTINUED | OUTPATIENT
Start: 2021-09-06 | End: 2021-09-07 | Stop reason: HOSPADM

## 2021-09-06 RX ORDER — HALOPERIDOL 5 MG/ML
5 INJECTION INTRAMUSCULAR ONCE
Status: COMPLETED | OUTPATIENT
Start: 2021-09-06 | End: 2021-09-06

## 2021-09-06 RX ORDER — METOCLOPRAMIDE HYDROCHLORIDE 5 MG/ML
5 INJECTION INTRAMUSCULAR; INTRAVENOUS
Status: DISCONTINUED | OUTPATIENT
Start: 2021-09-06 | End: 2021-09-07

## 2021-09-06 RX ORDER — PROMETHAZINE HYDROCHLORIDE 25 MG/1
25 SUPPOSITORY RECTAL EVERY 6 HOURS PRN
Status: DISCONTINUED | OUTPATIENT
Start: 2021-09-06 | End: 2021-09-07

## 2021-09-06 RX ORDER — INSULIN LISPRO 100 [IU]/ML
0-9 INJECTION, SOLUTION INTRAVENOUS; SUBCUTANEOUS
Status: DISCONTINUED | OUTPATIENT
Start: 2021-09-07 | End: 2021-09-07 | Stop reason: HOSPADM

## 2021-09-06 RX ORDER — SODIUM CHLORIDE 0.9 % (FLUSH) 0.9 %
10 SYRINGE (ML) INJECTION EVERY 12 HOURS SCHEDULED
Status: DISCONTINUED | OUTPATIENT
Start: 2021-09-06 | End: 2021-09-07 | Stop reason: HOSPADM

## 2021-09-06 RX ADMIN — ONDANSETRON 4 MG: 2 INJECTION INTRAMUSCULAR; INTRAVENOUS at 14:31

## 2021-09-06 RX ADMIN — METOCLOPRAMIDE HYDROCHLORIDE 5 MG: 5 INJECTION INTRAMUSCULAR; INTRAVENOUS at 20:31

## 2021-09-06 RX ADMIN — PROMETHAZINE HYDROCHLORIDE 12.5 MG: 25 INJECTION INTRAMUSCULAR; INTRAVENOUS at 16:07

## 2021-09-06 RX ADMIN — DULOXETINE HYDROCHLORIDE 30 MG: 30 CAPSULE, DELAYED RELEASE ORAL at 20:27

## 2021-09-06 RX ADMIN — IOPAMIDOL 100 ML: 612 INJECTION, SOLUTION INTRAVENOUS at 14:47

## 2021-09-06 RX ADMIN — SODIUM CHLORIDE 100 ML/HR: 9 INJECTION, SOLUTION INTRAVENOUS at 19:03

## 2021-09-06 RX ADMIN — SODIUM CHLORIDE, PRESERVATIVE FREE 10 ML: 5 INJECTION INTRAVENOUS at 20:27

## 2021-09-06 RX ADMIN — SODIUM CHLORIDE, PRESERVATIVE FREE 10 ML: 5 INJECTION INTRAVENOUS at 23:45

## 2021-09-06 RX ADMIN — METOCLOPRAMIDE HYDROCHLORIDE 10 MG: 5 INJECTION INTRAMUSCULAR; INTRAVENOUS at 13:09

## 2021-09-06 RX ADMIN — SODIUM CHLORIDE 1000 ML: 9 INJECTION, SOLUTION INTRAVENOUS at 13:08

## 2021-09-06 RX ADMIN — ENOXAPARIN SODIUM 40 MG: 40 INJECTION SUBCUTANEOUS at 23:45

## 2021-09-06 RX ADMIN — PANTOPRAZOLE SODIUM 40 MG: 40 TABLET, DELAYED RELEASE ORAL at 20:27

## 2021-09-06 RX ADMIN — HALOPERIDOL LACTATE 5 MG: 5 INJECTION, SOLUTION INTRAMUSCULAR at 13:56

## 2021-09-06 NOTE — ED NOTES
Pt reports black emesis and diarrhea since this AM. Pt c/o lower abdominal pain.      Veronica Gallardo, RN  09/06/21 3815

## 2021-09-06 NOTE — ED NOTES
Nursing report ED to floor  Morro Blancas  31 y.o.  male    HPI (triage note):   Chief Complaint   Patient presents with   • Abdominal Pain   • Vomiting       Admitting doctor:   Eugene Silveira MD    Admitting diagnosis:   The primary encounter diagnosis was Gastroparesis. Diagnoses of Intractable vomiting with nausea and Diarrhea, unspecified type were also pertinent to this visit.    Code status:   Current Code Status     Date Active Code Status Order ID Comments User Context       Prior    Advance Care Planning Activity          Allergies:   Patient has no known allergies.    Weight:   There were no vitals filed for this visit.    Most recent vitals:   Vitals:    09/06/21 1239 09/06/21 1249 09/06/21 1540   BP: (!) 176/111 140/94 147/89   BP Location:   Right arm   Patient Position:   Lying   Pulse: 77 93 92   Resp: 16 16    Temp: 97.3 °F (36.3 °C)     TempSrc: Tympanic     SpO2: 99% 98% 99%       Active LDAs/IV Access:   Lines, Drains & Airways    Active LDAs     Name:   Placement date:   Placement time:   Site:   Days:    Peripheral IV 09/06/21 1240 Right Antecubital   09/06/21    1240    Antecubital   less than 1    Insulin Pump   07/05/21    1529     63                Labs (abnormal labs have a star):   Labs Reviewed   COMPREHENSIVE METABOLIC PANEL - Abnormal; Notable for the following components:       Result Value    Glucose 227 (*)     All other components within normal limits    Narrative:     GFR Normal >60  Chronic Kidney Disease <60  Kidney Failure <15     CBC WITH AUTO DIFFERENTIAL - Abnormal; Notable for the following components:    WBC 16.92 (*)     Neutrophil % 79.3 (*)     Lymphocyte % 13.7 (*)     Immature Grans % 0.6 (*)     Neutrophils, Absolute 13.43 (*)     Monocytes, Absolute 0.93 (*)     Immature Grans, Absolute 0.10 (*)     All other components within normal limits   COVID-19,[facility] CEPHEID/LISANDRO, NP SWAB IN TRANSPORT MEDIA 8-12 HR TAT - Normal    Narrative:     Fact sheet for  providers: https://www.fda.gov/media/064801/download     Fact sheet for patients: https://www.fda.gov/media/544456/download   LIPASE - Normal   COVID PRE-OP / PRE-PROCEDURE SCREENING ORDER (NO ISOLATION)    Narrative:     The following orders were created for panel order COVID PRE-OP / PRE-PROCEDURE SCREENING ORDER (NO ISOLATION) - Swab, Nasopharynx.  Procedure                               Abnormality         Status                     ---------                               -----------         ------                     COVID-19,[facility]...[[id number]]  Normal              Final result                 Please view results for these tests on the individual orders.   URINALYSIS W/ MICROSCOPIC IF INDICATED (NO CULTURE)   URINE DRUG SCREEN   CBC AND DIFFERENTIAL    Narrative:     The following orders were created for panel order CBC & Differential.  Procedure                               Abnormality         Status                     ---------                               -----------         ------                     CBC Auto Differential[[id number]]        Abnormal            Final result                 Please view results for these tests on the individual orders.       EKG:   No orders to display       Meds given in ED:   Medications   sodium chloride 0.9 % flush 10 mL (has no administration in time range)   promethazine (PHENERGAN) 12.5 mg in sodium chloride 0.9 % 50 mL (0 mg Intravenous Stopped 9/6/21 1630)   metoclopramide (REGLAN) injection 5 mg (has no administration in time range)   sodium chloride 0.9 % infusion (has no administration in time range)   sodium chloride 0.9 % bolus 1,000 mL (0 mL Intravenous Stopped 9/6/21 1609)   metoclopramide (REGLAN) injection 10 mg (10 mg Intravenous Given 9/6/21 1309)   haloperidol lactate (HALDOL) injection 5 mg (5 mg Intravenous Given 9/6/21 1356)   ondansetron (ZOFRAN) injection 4 mg (4 mg Intravenous Given 9/6/21 1431)   iopamidol (ISOVUE-300) 61 % injection  100 mL (100 mL Intravenous Given by Other 9/6/21 6093)       Imaging results:  CT Abdomen Pelvis With Contrast    Result Date: 9/6/2021   No acute inflammatory process of bowel is identified, follow up as indications persist. Tiny appendicolithiasis.  No obstructive uropathy.  This report was finalized on 9/6/2021 3:08 PM by Dr. Trenton Hill M.D.        Ambulatory status:   Standby     Social issues:   Social History     Socioeconomic History   • Marital status: Significant Other     Spouse name: Not on file   • Number of children: Not on file   • Years of education: Not on file   • Highest education level: Not on file   Tobacco Use   • Smoking status: Current Every Day Smoker     Packs/day: 0.25     Years: 15.00     Pack years: 3.75     Types: Cigarettes     Start date: 2005   • Smokeless tobacco: Never Used   Vaping Use   • Vaping Use: Never used   Substance and Sexual Activity   • Alcohol use: Yes     Comment: 3-4 beers on weekend   • Drug use: Yes     Frequency: 7.0 times per week     Types: Marijuana     Comment: DAILY   • Sexual activity: Defer    Nursing report ED to floor       Veronica Gallardo RN  09/06/21 2600

## 2021-09-06 NOTE — ED NOTES
This RN in appropriate ppe while in pt room. Pt wearing mask.        Veronica Gallardo, RN  09/06/21 1470

## 2021-09-06 NOTE — ED NOTES
Pt failed PO challenge. Pt is currently vomiting after drinking oral fluids. Messaged pharmacy to get phenergan.      Veronica Gallardo, RN  09/06/21 6147

## 2021-09-06 NOTE — ED NOTES
Pt unable to provide urine sample at this time. Given a cup and instructions to notify staff when able to provide sample.      Veronica Gallardo, RN  09/06/21 7845

## 2021-09-06 NOTE — ED NOTES
Spoke with Yehuda, pharmacist after pt veroergmichael.      Veronica Gallardo, RN  09/06/21 1924

## 2021-09-06 NOTE — H&P
Patient Name:  Morro Blancas  YOB: 1990  MRN:  [id number]  Admit Date:  9/6/2021  Patient Care Team:  Gale Banegas APRN as PCP - General (Family Medicine)  Ysabel Javier MD as Consulting Physician (Endocrinology)      Subjective   History Present Illness     Chief Complaint   Patient presents with   • Abdominal Pain   • Vomiting       History of Present Illness   31-year-old male, with past medical history significant for anxiety, depression, type 1 diabetes, erectile dysfunction, gastroesophageal reflux disease, gastroparesis, alcohol and tobacco use, is seen in the hospital, with main complaints of nausea, vomiting associated with abdominal pain and diarrhea.  Patient upon further evaluation, in the emergency room he was given multiple medications, his nausea and vomiting has still not resolved.  Patient has not been able to tolerate p.o. intake.  ER physician discussed with me the need to keep him in the hospital for continuing to manage his symptoms as well as need for IV fluids.  Patient currently denies dizziness or loss of consciousness.  It would be reasonable to check urine drug screen because of history of marijuana abuse.  Patient on lab review is not in DKA.    Review of Systems   Constitutional: Positive for activity change and appetite change.   HENT: Negative for congestion and dental problem.    Eyes: Negative for discharge and itching.   Respiratory: Negative for apnea and chest tightness.    Cardiovascular: Negative for chest pain and palpitations.   Gastrointestinal: Negative for abdominal distention and positive for abdominal pain.   Endocrine: Negative for cold intolerance and heat intolerance.   Genitourinary: Negative for difficulty urinating and frequency.   Musculoskeletal: Negative for arthralgias and back pain.   Skin: Negative for color change and pallor.   Allergic/Immunologic: Negative for environmental allergies and food allergies.   Neurological: Negative  for dizziness and facial asymmetry.   Hematological: Negative for adenopathy. Does not bruise/bleed easily.   Psychiatric/Behavioral: Negative for agitation and suicidal ideas.       Personal History     Past Medical History:   Diagnosis Date   • Anxiety    • Anxiety and depression    • Colon polyp 8/30/2017    1   • Depression    • Diabetes type 1, uncontrolled (CMS/HCC)     diagnosed at age 17   • Dyspepsia    • Family hx of colon cancer    • Gastroparesis    • GERD (gastroesophageal reflux disease)    • GI (gastrointestinal bleed)      Past Surgical History:   Procedure Laterality Date   • COLONOSCOPY N/A 8/30/2017    One 3 mm polyp in the sigmoid colon, Granularity in the terminal ileum   • ENDOSCOPY N/A 4/15/2021    Procedure: ESOPHAGOGASTRODUODENOSCOPY with biopsies;  Surgeon: Da Fleming MD;  Location: [facility] ENDOSCOPY;  Service: Gastroenterology;  Laterality: N/A;  PRE:   nausea, vomiting, hematemesis  POST:  reflux esophagitis, erosive gastropathy, scalloped mucosa in duodenum ? celiac     Family History   Problem Relation Age of Onset   • Cancer Mother    • Hypertension Father    • Depression Paternal Uncle    • Hypertension Maternal Grandmother    • Irritable bowel syndrome Maternal Grandmother    • Colon cancer Maternal Grandmother    • Hypertension Maternal Grandfather    • Alcohol abuse Maternal Grandfather    • Hypertension Paternal Grandmother    • Cancer Paternal Grandmother    • COPD Paternal Grandmother    • Depression Paternal Grandmother    • Heart disease Paternal Grandfather    • Hypertension Paternal Grandfather      Social History     Tobacco Use   • Smoking status: Current Every Day Smoker     Packs/day: 0.25     Years: 15.00     Pack years: 3.75     Types: Cigarettes     Start date: 2005   • Smokeless tobacco: Never Used   Vaping Use   • Vaping Use: Never used   Substance Use Topics   • Alcohol use: Yes     Comment: 3-4 beers on weekend   • Drug use: Yes     Frequency: 7.0 times per  week     Types: Marijuana     Comment: DAILY     No current facility-administered medications on file prior to encounter.     Current Outpatient Medications on File Prior to Encounter   Medication Sig Dispense Refill   • Acetone, Urine, Test (Ketone Test) strip 1 strip by In Vitro route As Needed (nausea, vomiting with elevated glucose levels). 50 strip 3   • Continuous Blood Gluc Sensor (Dexcom G6 Sensor)      • DULoxetine (CYMBALTA) 30 MG capsule Take 1 capsule by mouth Daily. 90 capsule 3   • Glucagon, rDNA, (Glucagon Emergency) 1 MG kit As Needed.     • Insulin Disposable Pump (OmniPod Dash 5 Pack Pods) misc 0.85 Units. Basal rate recently decreased 1.5 u/hr to 0.85 u/hr   1 unit per 12 carb  Correction factor 1unit=45mg/dL  Goal 150      • metoclopramide (Reglan) 5 MG tablet Take 1 tablet by mouth 4 (Four) Times a Day Before Meals & at Bedtime As Needed (nausea/vomiting/gatroparesis). 120 tablet 3   • multivitamin with minerals tablet tablet Take 1 tablet by mouth Daily. Chewable gummie     • ondansetron ODT (ZOFRAN-ODT) 4 MG disintegrating tablet Take 4 mg by mouth Every 8 (Eight) Hours As Needed.     • pantoprazole (PROTONIX) 40 MG EC tablet Take 1 tablet by mouth 2 (Two) Times a Day Before Meals for 180 days. 60 tablet 5   • promethazine (PHENERGAN) 25 MG suppository Insert 1 suppository into the rectum Every 6 (Six) Hours As Needed for Nausea or Vomiting. 20 suppository 0   • rOPINIRole (REQUIP) 0.5 MG tablet Take 0.5 mg by mouth Daily As Needed.       No Known Allergies    Objective    Objective     Vital Signs  Temp:  [97.3 °F (36.3 °C)] 97.3 °F (36.3 °C)  Heart Rate:  [77-93] 92  Resp:  [16] 16  BP: (140-176)/() 147/89  SpO2:  [98 %-99 %] 99 %  on   ;   Device (Oxygen Therapy): room air  There is no height or weight on file to calculate BMI.    Physical Exam  General, awake and alert.  Head and ENT, normocephalic and atraumatic.  Lungs, symmetric expansion, equal air entry bilaterally.  Heart,  regular rate and rhythm.  Abdomen, soft and nontender.  Very mild tenderness noted.  Extremities, no clubbing or cyanosis.  Neuro, no focal deficits.  Cranial nerves intact.  Skin: Warm and no rash.  Psych, normal mood and affect.  Musculoskeletal, joint examination is grossly normal.    Results Review:  I reviewed the patient's new clinical results.  I reviewed the patient's new imaging results and agree with the interpretation.  I reviewed the patient's other test results and agree with the interpretation  I personally viewed and interpreted the patient's EKG/Telemetry data  Discussed with ED provider.    Lab Results (last 24 hours)     Procedure Component Value Units Date/Time    CBC & Differential [[id number]]  (Abnormal) Collected: 09/06/21 1300    Specimen: Blood Updated: 09/06/21 1311    Narrative:      The following orders were created for panel order CBC & Differential.  Procedure                               Abnormality         Status                     ---------                               -----------         ------                     CBC Auto Differential[[id number]]        Abnormal            Final result                 Please view results for these tests on the individual orders.    Comprehensive Metabolic Panel [[id number]]  (Abnormal) Collected: 09/06/21 1300    Specimen: Blood Updated: 09/06/21 1327     Glucose 227 mg/dL      BUN 15 mg/dL      Creatinine 0.88 mg/dL      Sodium 140 mmol/L      Potassium 4.1 mmol/L      Chloride 100 mmol/L      CO2 27.2 mmol/L      Calcium 9.6 mg/dL      Total Protein 7.9 g/dL      Albumin 4.90 g/dL      ALT (SGPT) 20 U/L      AST (SGOT) 17 U/L      Alkaline Phosphatase 78 U/L      Total Bilirubin 0.4 mg/dL      eGFR Non African Amer 101 mL/min/1.73      Globulin 3.0 gm/dL      A/G Ratio 1.6 g/dL      BUN/Creatinine Ratio 17.0     Anion Gap 12.8 mmol/L     Narrative:      GFR Normal >60  Chronic Kidney Disease <60  Kidney Failure <15      Lipase [[id number]]   (Normal) Collected: 09/06/21 1300    Specimen: Blood Updated: 09/06/21 1327     Lipase 33 U/L     CBC Auto Differential [[id number]]  (Abnormal) Collected: 09/06/21 1300    Specimen: Blood Updated: 09/06/21 1311     WBC 16.92 10*3/mm3      RBC 4.78 10*6/mm3      Hemoglobin 13.8 g/dL      Hematocrit 42.0 %      MCV 87.9 fL      MCH 28.9 pg      MCHC 32.9 g/dL      RDW 12.9 %      RDW-SD 41.6 fl      MPV 10.1 fL      Platelets 342 10*3/mm3      Neutrophil % 79.3 %      Lymphocyte % 13.7 %      Monocyte % 5.5 %      Eosinophil % 0.5 %      Basophil % 0.4 %      Immature Grans % 0.6 %      Neutrophils, Absolute 13.43 10*3/mm3      Lymphocytes, Absolute 2.31 10*3/mm3      Monocytes, Absolute 0.93 10*3/mm3      Eosinophils, Absolute 0.08 10*3/mm3      Basophils, Absolute 0.07 10*3/mm3      Immature Grans, Absolute 0.10 10*3/mm3      nRBC 0.0 /100 WBC     COVID PRE-OP / PRE-PROCEDURE SCREENING ORDER (NO ISOLATION) - Swab, Nasopharynx [[id number]]  (Normal) Collected: 09/06/21 1359    Specimen: Swab from Nasopharynx Updated: 09/06/21 1535    Narrative:      The following orders were created for panel order COVID PRE-OP / PRE-PROCEDURE SCREENING ORDER (NO ISOLATION) - Swab, Nasopharynx.  Procedure                               Abnormality         Status                     ---------                               -----------         ------                     COVID-19,[facility]...[[id number]]  Normal              Final result                 Please view results for these tests on the individual orders.    COVID-19,[facility] CEPHEID/LISANDRO NP SWAB IN TRANSPORT MEDIA 8-12 HR TAT - Swab, Nasopharynx [[id number]]  (Normal) Collected: 09/06/21 1359    Specimen: Swab from Nasopharynx Updated: 09/06/21 1535     COVID19 Not Detected    Narrative:      Fact sheet for providers: https://www.fda.gov/media/256242/download     Fact sheet for patients: https://www.fda.gov/media/225473/download          Imaging Results (Last 24 Hours)      Procedure Component Value Units Date/Time    CT Abdomen Pelvis With Contrast [[id number]] Collected: 09/06/21 1457     Updated: 09/06/21 1511    Narrative:      CT ABDOMEN PELVIS W CONTRAST-     INDICATIONS: Abdominal pain     TECHNIQUE: Radiation dose reduction techniques were utilized, including  automated exposure control and exposure modulation based on body size.  Enhanced ABDOMEN AND PELVIS CT     COMPARISON: 05/23/2021     FINDINGS:     Unremarkable appearance of the liver, gallbladder, spleen, adrenal  glands, pancreas, kidneys, bladder.     No bowel obstruction or abnormal bowel thickening is identified. The  appendix does not appear inflamed, although tiny appendicolithiasis is  apparent. Minimal hiatal hernia.     No free intraperitoneal gas or free fluid. Minimal umbilical hernia of  fat is seen.     Scattered small mesenteric and para-aortic lymph nodes are seen that are  not significant by size criteria.     Abdominal aorta is not aneurysmal.     The lung bases are clear.     No acute fracture is identified.             Impression:         No acute inflammatory process of bowel is identified, follow up as  indications persist. Tiny appendicolithiasis.     No obstructive uropathy.     This report was finalized on 9/6/2021 3:08 PM by Dr. Trenton Hill M.D.                 No orders to display        Assessment/Plan     Active Hospital Problems    Diagnosis  POA   • Intractable nausea and vomiting [R11.2]  Yes   • Marijuana abuse [F12.10]  Yes   • Tobacco abuse [Z72.0]  Yes   • Gastroparesis [K31.84]  Yes   • Family history of colorectal cancer [Z80.0]  Not Applicable   • Type 1 diabetes mellitus with hyperglycemia (CMS/HCC) [E10.65]  Yes   • Anxiety and depression [F41.9, F32.9]  Yes      Resolved Hospital Problems   No resolved problems to display.     Assessment and plan:  1.  Intractable nausea and vomiting, patient has underlying history of gastroparesis, start IV scheduled Reglan.  GI  consultation has been requested.  Check urine drug screen to rule out marijuana abuse, as excessive marijuana can cause cyclic vomiting syndrome.  IV fluid resuscitation.     2.  Tobacco abuse, tobacco cessation counseling.    3.  Type 1 diabetes mellitus, initiate Accu-Cheks and sliding scale insulin coverage.    4.  Leukocytosis, likely reactive.  Continue to monitor labs.    5.  Dehydration, continue IV fluids.    6.  CODE STATUS is full code.  Further plans based on hospital course.       Eugene Silveira MD  [facility]  09/06/21  16:38 EDT

## 2021-09-06 NOTE — ED NOTES
This RN in appropriate ppe while in pt room. Pt wearing mask.        Veronica Gallardo, RN  09/06/21 1300

## 2021-09-06 NOTE — NURSING NOTE
17:46 EDT  Report rcvd. No drug screen as of yet, RN advise will attempt to get prior to pt's arrival.  18:36 EDT  Pt has not arrived to floor as of yet.   18:40 EDT  Pt arrives to floor

## 2021-09-06 NOTE — ED TRIAGE NOTES
abd pain and vomiting started at 0700 today    Patient was placed in face mask during first look triage.  Patient was wearing a face mask throughout encounter.  I wore personal protective equipment throughout the encounter.  Hand hygiene was performed before and after patient encounter.

## 2021-09-06 NOTE — ED NOTES
This RN in appropriate ppe while in pt room. Pt wearing mask.        Veronica Gallardo, RN  09/06/21 6138

## 2021-09-06 NOTE — ED PROVIDER NOTES
EMERGENCY DEPARTMENT ENCOUNTER    Room Number:  P377/1  Date of encounter:  9/6/2021  PCP: Gale Banegas APRN  Historian: Patient      HPI:  Chief Complaint: Nausea, vomiting, diarrhea, abdominal pain  A complete HPI/ROS/PMH/PSH/SH/FH are unobtainable due to: Nothing    Context: Morro Blancas is a 31 y.o. male who presents to the ED c/o nausea, vomiting, diarrhea, abdominal pain.  Parent states that the symptoms began approximately 24 hours ago.  He describes his pain at present as a 7 out of 10 on the pain scale and states it is located in the lower abdomen.  It does wax and wane in severity.  Nothing makes it better or worse.  He states that his knowledge he has not been around any recent sick contacts, any recent travel, or eating anything out of the ordinary.  He denies associated fever, chest pain, neck pain, headache but endorses chills.  He denies being a drinker.    Reviewing patient's chart he is a type I diabetic with a past medical history of gastroparesis, GI bleed, and alcohol abuse.    PAST MEDICAL HISTORY  Active Ambulatory Problems     Diagnosis Date Noted   • Type 1 diabetes mellitus with hyperglycemia (CMS/[facility]) 06/15/2017   • Fecal incontinence 06/15/2017   • Erectile dysfunction 06/15/2017   • Anxiety and depression 06/15/2017   • Hyperplastic colonic polyp 11/04/2020   • Gastroparesis 11/04/2020   • Family history of colorectal cancer 11/04/2020   • Sepsis without acute organ dysfunction (CMS/[facility]) 03/21/2021   • Alcohol abuse 03/21/2021   • Tobacco abuse 03/21/2021   • Acute upper GI bleed 04/15/2021   • Non-intractable vomiting with nausea 05/23/2021   • Leukocytosis 05/24/2021   • Marijuana abuse 05/26/2021     Resolved Ambulatory Problems     Diagnosis Date Noted   • Intractable vomiting 03/20/2021   • DKA (diabetic ketoacidoses) (CMS/HCC) 07/06/2021     Past Medical History:   Diagnosis Date   • Anxiety    • Colon polyp 8/30/2017   • Depression    • Diabetes type 1, uncontrolled (CMS/[facility])     • Dyspepsia    • Family hx of colon cancer    • GERD (gastroesophageal reflux disease)    • GI (gastrointestinal bleed)          PAST SURGICAL HISTORY  Past Surgical History:   Procedure Laterality Date   • COLONOSCOPY N/A 8/30/2017    One 3 mm polyp in the sigmoid colon, Granularity in the terminal ileum   • ENDOSCOPY N/A 4/15/2021    Procedure: ESOPHAGOGASTRODUODENOSCOPY with biopsies;  Surgeon: Da Fleming MD;  Location: [facility] ENDOSCOPY;  Service: Gastroenterology;  Laterality: N/A;  PRE:   nausea, vomiting, hematemesis  POST:  reflux esophagitis, erosive gastropathy, scalloped mucosa in duodenum ? celiac         FAMILY HISTORY  Family History   Problem Relation Age of Onset   • Cancer Mother    • Hypertension Father    • Depression Paternal Uncle    • Hypertension Maternal Grandmother    • Irritable bowel syndrome Maternal Grandmother    • Colon cancer Maternal Grandmother    • Hypertension Maternal Grandfather    • Alcohol abuse Maternal Grandfather    • Hypertension Paternal Grandmother    • Cancer Paternal Grandmother    • COPD Paternal Grandmother    • Depression Paternal Grandmother    • Heart disease Paternal Grandfather    • Hypertension Paternal Grandfather          SOCIAL HISTORY  Social History     Socioeconomic History   • Marital status: Significant Other     Spouse name: Not on file   • Number of children: Not on file   • Years of education: Not on file   • Highest education level: Not on file   Tobacco Use   • Smoking status: Current Every Day Smoker     Packs/day: 0.25     Years: 15.00     Pack years: 3.75     Types: Cigarettes     Start date: 2005   • Smokeless tobacco: Never Used   Vaping Use   • Vaping Use: Never used   Substance and Sexual Activity   • Alcohol use: Yes     Comment: 3-4 beers on weekend   • Drug use: Yes     Frequency: 7.0 times per week     Types: Marijuana     Comment: DAILY   • Sexual activity: Defer         ALLERGIES  Patient has no known allergies.        REVIEW  OF SYSTEMS  Review of Systems   Constitutional: Positive for chills. Negative for fever.   HENT: Negative.    Respiratory: Negative.    Cardiovascular: Negative.    Gastrointestinal: Positive for abdominal pain, diarrhea, nausea and vomiting.   Genitourinary: Negative.    Musculoskeletal: Negative.    Neurological: Positive for weakness.        All systems reviewed and negative except for those discussed in HPI.       PHYSICAL EXAM    I have reviewed the triage vital signs and nursing notes.    ED Triage Vitals [09/06/21 1239]   Temp Heart Rate Resp BP SpO2   97.3 °F (36.3 °C) 77 16 (!) 176/111 99 %      Temp src Heart Rate Source Patient Position BP Location FiO2 (%)   Tympanic Monitor -- -- --       Physical Exam  GENERAL: Well-nourished, appears fatigued and uncomfortable  HENT: nares patent, mucous membranes slightly dry  EYES: no scleral icterus  CV: regular rhythm, regular rate, no obvious murmur  RESPIRATORY: normal effort, lungs CTA B  ABDOMEN: TTP across lower abdomen without guarding or rebound.  Hyperactive bowel sounds.  No mass  MUSCULOSKELETAL: no deformity  NEURO: alert and oriented x4, moves all extremities, follows commands  SKIN: warm, dry        LAB RESULTS  Recent Results (from the past 24 hour(s))   Comprehensive Metabolic Panel    Collection Time: 09/06/21  1:00 PM    Specimen: Blood   Result Value Ref Range    Glucose 227 (H) 65 - 99 mg/dL    BUN 15 6 - 20 mg/dL    Creatinine 0.88 0.76 - 1.27 mg/dL    Sodium 140 136 - 145 mmol/L    Potassium 4.1 3.5 - 5.2 mmol/L    Chloride 100 98 - 107 mmol/L    CO2 27.2 22.0 - 29.0 mmol/L    Calcium 9.6 8.6 - 10.5 mg/dL    Total Protein 7.9 6.0 - 8.5 g/dL    Albumin 4.90 3.50 - 5.20 g/dL    ALT (SGPT) 20 1 - 41 U/L    AST (SGOT) 17 1 - 40 U/L    Alkaline Phosphatase 78 39 - 117 U/L    Total Bilirubin 0.4 0.0 - 1.2 mg/dL    eGFR Non African Amer 101 >60 mL/min/1.73    Globulin 3.0 gm/dL    A/G Ratio 1.6 g/dL    BUN/Creatinine Ratio 17.0 7.0 - 25.0    Anion  Gap 12.8 5.0 - 15.0 mmol/L   Lipase    Collection Time: 09/06/21  1:00 PM    Specimen: Blood   Result Value Ref Range    Lipase 33 13 - 60 U/L   CBC Auto Differential    Collection Time: 09/06/21  1:00 PM    Specimen: Blood   Result Value Ref Range    WBC 16.92 (H) 3.40 - 10.80 10*3/mm3    RBC 4.78 4.14 - 5.80 10*6/mm3    Hemoglobin 13.8 13.0 - 17.7 g/dL    Hematocrit 42.0 37.5 - 51.0 %    MCV 87.9 79.0 - 97.0 fL    MCH 28.9 26.6 - 33.0 pg    MCHC 32.9 31.5 - 35.7 g/dL    RDW 12.9 12.3 - 15.4 %    RDW-SD 41.6 37.0 - 54.0 fl    MPV 10.1 6.0 - 12.0 fL    Platelets 342 140 - 450 10*3/mm3    Neutrophil % 79.3 (H) 42.7 - 76.0 %    Lymphocyte % 13.7 (L) 19.6 - 45.3 %    Monocyte % 5.5 5.0 - 12.0 %    Eosinophil % 0.5 0.3 - 6.2 %    Basophil % 0.4 0.0 - 1.5 %    Immature Grans % 0.6 (H) 0.0 - 0.5 %    Neutrophils, Absolute 13.43 (H) 1.70 - 7.00 10*3/mm3    Lymphocytes, Absolute 2.31 0.70 - 3.10 10*3/mm3    Monocytes, Absolute 0.93 (H) 0.10 - 0.90 10*3/mm3    Eosinophils, Absolute 0.08 0.00 - 0.40 10*3/mm3    Basophils, Absolute 0.07 0.00 - 0.20 10*3/mm3    Immature Grans, Absolute 0.10 (H) 0.00 - 0.05 10*3/mm3    nRBC 0.0 0.0 - 0.2 /100 WBC   COVID-19,[facility] IN-HOUSE CEPHEID/LISANDRO NP SWAB IN TRANSPORT MEDIA 8-12 HR TAT - Swab, Nasopharynx    Collection Time: 09/06/21  1:59 PM    Specimen: Nasopharynx; Swab   Result Value Ref Range    COVID19 Not Detected Not Detected - Ref. Range   Urinalysis With Microscopic If Indicated (No Culture) - Urine, Clean Catch    Collection Time: 09/06/21  5:51 PM    Specimen: Urine, Clean Catch   Result Value Ref Range    Color, UA Yellow Yellow, Straw    Appearance, UA Clear Clear    pH, UA 7.0 5.0 - 8.0    Specific Gravity, UA >=1.030 1.005 - 1.030    Glucose, UA >=1000 mg/dL (3+) (A) Negative    Ketones, UA 15 mg/dL (1+) (A) Negative    Bilirubin, UA Negative Negative    Blood, UA Small (1+) (A) Negative    Protein, UA Negative Negative    Leuk Esterase, UA Negative Negative    Nitrite, UA  Negative Negative    Urobilinogen, UA 0.2 E.U./dL 0.2 - 1.0 E.U./dL   Urine Drug Screen - Urine, Clean Catch    Collection Time: 09/06/21  5:51 PM    Specimen: Urine, Clean Catch   Result Value Ref Range    Amphet/Methamphet, Screen Negative Negative    Barbiturates Screen, Urine Negative Negative    Benzodiazepine Screen, Urine Negative Negative    Cocaine Screen, Urine Negative Negative    Opiate Screen Negative Negative    THC, Screen, Urine Positive (A) Negative    Methadone Screen, Urine Negative Negative    Oxycodone Screen, Urine Negative Negative   Urinalysis, Microscopic Only - Urine, Clean Catch    Collection Time: 09/06/21  5:51 PM    Specimen: Urine, Clean Catch   Result Value Ref Range    RBC, UA 3-5 (A) None Seen, 0-2 /HPF    WBC, UA 0-2 None Seen, 0-2 /HPF    Bacteria, UA None Seen None Seen /HPF    Squamous Epithelial Cells, UA None Seen None Seen, 0-2 /HPF    Hyaline Casts, UA None Seen None Seen /LPF    Methodology Manual Light Microscopy        Ordered the above labs and independently reviewed the results.        RADIOLOGY  CT Abdomen Pelvis With Contrast    Result Date: 9/6/2021  CT ABDOMEN PELVIS W CONTRAST-  INDICATIONS: Abdominal pain  TECHNIQUE: Radiation dose reduction techniques were utilized, including automated exposure control and exposure modulation based on body size. Enhanced ABDOMEN AND PELVIS CT  COMPARISON: 05/23/2021  FINDINGS:  Unremarkable appearance of the liver, gallbladder, spleen, adrenal glands, pancreas, kidneys, bladder.  No bowel obstruction or abnormal bowel thickening is identified. The appendix does not appear inflamed, although tiny appendicolithiasis is apparent. Minimal hiatal hernia.  No free intraperitoneal gas or free fluid. Minimal umbilical hernia of fat is seen.  Scattered small mesenteric and para-aortic lymph nodes are seen that are not significant by size criteria.  Abdominal aorta is not aneurysmal.  The lung bases are clear.  No acute fracture is  identified.         No acute inflammatory process of bowel is identified, follow up as indications persist. Tiny appendicolithiasis.  No obstructive uropathy.  This report was finalized on 9/6/2021 3:08 PM by Dr. Trenton Hill M.D.        I ordered the above noted radiological studies. Reviewed by me and discussed with radiologist.  See dictation for official radiology interpretation.      PROCEDURES    Procedures      MEDICATIONS GIVEN IN ER    Medications   sodium chloride 0.9 % flush 10 mL (10 mL Intravenous Given 9/6/21 2027)   promethazine (PHENERGAN) 12.5 mg in sodium chloride 0.9 % 50 mL (0 mg Intravenous Stopped 9/6/21 1630)   metoclopramide (REGLAN) injection 5 mg (5 mg Intravenous Given 9/6/21 2031)   DULoxetine (CYMBALTA) DR capsule 30 mg (30 mg Oral Given 9/6/21 2027)   pantoprazole (PROTONIX) EC tablet 40 mg (40 mg Oral Given 9/6/21 2027)   promethazine (PHENERGAN) suppository 25 mg (has no administration in time range)   dextrose (GLUTOSE) oral gel 15 g (has no administration in time range)   dextrose (D50W) 25 g/ 50mL Intravenous Solution 25 g (has no administration in time range)   glucagon (human recombinant) (GLUCAGEN DIAGNOSTIC) injection 1 mg (has no administration in time range)   insulin lispro (ADMELOG) injection 0-9 Units (has no administration in time range)   sodium chloride 0.9 % infusion (100 mL/hr Intravenous New Bag 9/6/21 1903)   sodium chloride 0.9 % bolus 1,000 mL (0 mL Intravenous Stopped 9/6/21 1609)   metoclopramide (REGLAN) injection 10 mg (10 mg Intravenous Given 9/6/21 1309)   haloperidol lactate (HALDOL) injection 5 mg (5 mg Intravenous Given 9/6/21 1356)   ondansetron (ZOFRAN) injection 4 mg (4 mg Intravenous Given 9/6/21 1431)   iopamidol (ISOVUE-300) 61 % injection 100 mL (100 mL Intravenous Given by Other 9/6/21 1447)         PROGRESS, DATA ANALYSIS, CONSULTS, AND MEDICAL DECISION MAKING    All labs have been independently reviewed by me.  All radiology studies have  been reviewed by me and discussed with radiologist dictating the report.   EKG's independently viewed and interpreted by me.  Discussion below represents my analysis of pertinent findings related to patient's condition, differential diagnosis, treatment plan and final disposition.    DDx includes but is not limited to: Gastroenteritis, food poisoning, pancreatitis, DKA, diverticulitis, acute appendicitis, gastroparesis.  Further evaluate the patient will obtain a CBC, CMP, lipase, UA.  The patient already been given Zofran via EMS and continues to actively dry heaving vomit.  Given the history of gastroparesis will attempt 10 mg of Reglan IV for the nausea and vomiting as well as a liter of saline.  Please see below for further MDM and course of care.    ED Course as of Sep 06 2042   Mon Sep 06, 2021   1308 BP: 140/94 [RC]   1308 Temp: 97.3 °F (36.3 °C) [RC]   1308 Heart Rate: 93 [RC]   1308 Resp: 16 [RC]   1308 RA   SpO2: 98 % [RC]   1321 WBC(!): 16.92 [RC]   1321 White count with left shift is noted.  Given the physical exam will order a CT of the abdomen pelvis with IV contrast.   Neutrophils Absolute(!): 13.43 [RC]   1343 Patient is now had 4 mg of Zofran, 10 of Reglan and continues to actively and frequently vomit.  Will order 2 mg of Haldol    [RC]   1344 Glucose(!): 227 [RC]   1344 BUN: 15 [RC]   1344 Creatinine: 0.88 [RC]   1344 CO2: 27.2 [RC]   1344 I did not actively smell ketones on the patient.  CO2 and anion gap are normal.  I doubt DKA.  CT is still pending.   Anion Gap: 12.8 [RC]   1345 Patient receiving third antiemetic.  I feel this patient will likely need to be admitted due to intractable nausea vomiting.  We will go ahead and order COVID-19 testing at this time.    [RC]   1409 Patient has had 4 mg of Zofran, 10 mg of Reglan, 5 mg of Haldol and continues to have active emesis and dry heaves.  Will order 4 more milligrams of Zofran and 12.5 of Phenergan.    [RC]   1456 Still awaiting official read  on CT abdomen pelvis contrast.  Patient is now resting quietly.  If the CT is negative for an acute process, I plan to orally challenge to determine disposition.    [RC]   1551 CT abdomen pelvis with contrast shows no acute process.  Of note there is an appendicolith but there is no noted appendicitis.  Clinical picture also does not correspond with appendicitis.    [RC]   1600 COVID19: Not Detected [RC]   1609 Working diagnosis at this point is intractable nausea and vomiting, diarrhea, gastroparesis.    Discussed patient's case with Dr. Silveira who will accept the patient in a [facility] bed.    [RC]      ED Course User Index  [RC] Jose Angel Matias III, PA       Patient was placed in face mask in first look. Patient was wearing facemask when I entered the room and throughout our encounter. I wore full protective equipment throughout this patient encounter including a face mask, and gloves. Hand hygiene was performed before donning protective equipment and after removal when leaving the room.    AS OF 20:42 EDT VITALS:    BP - 130/82  HR - 108  TEMP - 97.7 °F (36.5 °C) (Oral)  O2 SATS - 97%        DIAGNOSIS  Final diagnoses:   Gastroparesis   Intractable vomiting with nausea   Diarrhea, unspecified type         DISPOSITION  ADMISSION    Discussed treatment plan and reason for admission with pt/family and admitting physician.  Pt/family voiced understanding of the plan for admission for further testing/treatment as needed.              Jose Angel Matias III, PA  09/06/21 2042

## 2021-09-07 ENCOUNTER — READMISSION MANAGEMENT (OUTPATIENT)
Dept: CALL CENTER | Facility: HOSPITAL | Age: 31
End: 2021-09-07

## 2021-09-07 VITALS
HEART RATE: 97 BPM | DIASTOLIC BLOOD PRESSURE: 78 MMHG | TEMPERATURE: 98.1 F | HEIGHT: 69 IN | RESPIRATION RATE: 18 BRPM | SYSTOLIC BLOOD PRESSURE: 125 MMHG | WEIGHT: 126.98 LBS | BODY MASS INDEX: 18.81 KG/M2 | OXYGEN SATURATION: 99 %

## 2021-09-07 PROBLEM — R11.2 INTRACTABLE NAUSEA AND VOMITING: Status: RESOLVED | Noted: 2021-09-06 | Resolved: 2021-09-07

## 2021-09-07 LAB
ANION GAP SERPL CALCULATED.3IONS-SCNC: 11.4 MMOL/L (ref 5–15)
BASOPHILS # BLD AUTO: 0.04 10*3/MM3 (ref 0–0.2)
BASOPHILS NFR BLD AUTO: 0.3 % (ref 0–1.5)
BUN SERPL-MCNC: 9 MG/DL (ref 6–20)
BUN/CREAT SERPL: 13.8 (ref 7–25)
CALCIUM SPEC-SCNC: 8.4 MG/DL (ref 8.6–10.5)
CHLORIDE SERPL-SCNC: 106 MMOL/L (ref 98–107)
CO2 SERPL-SCNC: 23.6 MMOL/L (ref 22–29)
CREAT SERPL-MCNC: 0.65 MG/DL (ref 0.76–1.27)
DEPRECATED RDW RBC AUTO: 43 FL (ref 37–54)
EOSINOPHIL # BLD AUTO: 0.01 10*3/MM3 (ref 0–0.4)
EOSINOPHIL NFR BLD AUTO: 0.1 % (ref 0.3–6.2)
ERYTHROCYTE [DISTWIDTH] IN BLOOD BY AUTOMATED COUNT: 13.4 % (ref 12.3–15.4)
GFR SERPL CREATININE-BSD FRML MDRD: 143 ML/MIN/1.73
GLUCOSE BLDC GLUCOMTR-MCNC: 81 MG/DL (ref 70–130)
GLUCOSE SERPL-MCNC: 83 MG/DL (ref 65–99)
HCT VFR BLD AUTO: 35.8 % (ref 37.5–51)
HGB BLD-MCNC: 12 G/DL (ref 13–17.7)
IMM GRANULOCYTES # BLD AUTO: 0.03 10*3/MM3 (ref 0–0.05)
IMM GRANULOCYTES NFR BLD AUTO: 0.2 % (ref 0–0.5)
LYMPHOCYTES # BLD AUTO: 3.04 10*3/MM3 (ref 0.7–3.1)
LYMPHOCYTES NFR BLD AUTO: 23.8 % (ref 19.6–45.3)
MCH RBC QN AUTO: 29.3 PG (ref 26.6–33)
MCHC RBC AUTO-ENTMCNC: 33.5 G/DL (ref 31.5–35.7)
MCV RBC AUTO: 87.5 FL (ref 79–97)
MONOCYTES # BLD AUTO: 0.99 10*3/MM3 (ref 0.1–0.9)
MONOCYTES NFR BLD AUTO: 7.7 % (ref 5–12)
NEUTROPHILS NFR BLD AUTO: 67.9 % (ref 42.7–76)
NEUTROPHILS NFR BLD AUTO: 8.67 10*3/MM3 (ref 1.7–7)
NRBC BLD AUTO-RTO: 0 /100 WBC (ref 0–0.2)
PLATELET # BLD AUTO: 295 10*3/MM3 (ref 140–450)
PMV BLD AUTO: 10.3 FL (ref 6–12)
POTASSIUM SERPL-SCNC: 3.8 MMOL/L (ref 3.5–5.2)
RBC # BLD AUTO: 4.09 10*6/MM3 (ref 4.14–5.8)
SODIUM SERPL-SCNC: 141 MMOL/L (ref 136–145)
WBC # BLD AUTO: 12.78 10*3/MM3 (ref 3.4–10.8)

## 2021-09-07 PROCEDURE — 80048 BASIC METABOLIC PNL TOTAL CA: CPT | Performed by: INTERNAL MEDICINE

## 2021-09-07 PROCEDURE — 96376 TX/PRO/DX INJ SAME DRUG ADON: CPT

## 2021-09-07 PROCEDURE — 25010000002 METOCLOPRAMIDE PER 10 MG: Performed by: INTERNAL MEDICINE

## 2021-09-07 PROCEDURE — 82962 GLUCOSE BLOOD TEST: CPT

## 2021-09-07 PROCEDURE — 96361 HYDRATE IV INFUSION ADD-ON: CPT

## 2021-09-07 PROCEDURE — 99214 OFFICE O/P EST MOD 30 MIN: CPT | Performed by: INTERNAL MEDICINE

## 2021-09-07 PROCEDURE — 85025 COMPLETE CBC W/AUTO DIFF WBC: CPT | Performed by: INTERNAL MEDICINE

## 2021-09-07 PROCEDURE — G0378 HOSPITAL OBSERVATION PER HR: HCPCS

## 2021-09-07 RX ORDER — METOCLOPRAMIDE 5 MG/1
5 TABLET ORAL
Status: DISCONTINUED | OUTPATIENT
Start: 2021-09-07 | End: 2021-09-07 | Stop reason: HOSPADM

## 2021-09-07 RX ADMIN — METOCLOPRAMIDE HYDROCHLORIDE 5 MG: 5 INJECTION INTRAMUSCULAR; INTRAVENOUS at 08:07

## 2021-09-07 RX ADMIN — PANTOPRAZOLE SODIUM 40 MG: 40 TABLET, DELAYED RELEASE ORAL at 08:07

## 2021-09-07 RX ADMIN — SODIUM CHLORIDE 100 ML/HR: 9 INJECTION, SOLUTION INTRAVENOUS at 05:09

## 2021-09-07 NOTE — CASE MANAGEMENT/SOCIAL WORK
Case Management Discharge Note      Final Note: Home---no needs         Selected Continued Care - Admitted Since 9/6/2021     Destination    No services have been selected for the patient.              Durable Medical Equipment    No services have been selected for the patient.              Dialysis/Infusion    No services have been selected for the patient.              Home Medical Care    No services have been selected for the patient.              Therapy    No services have been selected for the patient.              Community Resources    No services have been selected for the patient.              Community & DME    No services have been selected for the patient.                       Final Discharge Disposition Code: 01 - home or self-care

## 2021-09-07 NOTE — DISCHARGE SUMMARY
"Date of Admission: 9/6/2021  Date of Discharge:  9/7/2021  Primary Care Physician: Gale Banegas, APRN     Discharge Diagnosis:  Active Hospital Problems    Diagnosis  POA   • Marijuana abuse [F12.10]  Yes   • Tobacco abuse [Z72.0]  Yes   • Gastroparesis [K31.84]  Yes   • Family history of colorectal cancer [Z80.0]  Not Applicable   • Type 1 diabetes mellitus with hyperglycemia (CMS/HCC) [E10.65]  Yes   • Anxiety and depression [F41.9, F32.9]  Yes      Resolved Hospital Problems    Diagnosis Date Resolved POA   • Intractable nausea and vomiting [R11.2] 09/07/2021 Yes       Presenting Problem/History of Present Illness:  Gastroparesis [K31.84]  Intractable vomiting with nausea [R11.2]  Diarrhea, unspecified type [R19.7]     Hospital Course:  The patient is a 31 y.o. male with a history of type 1 DM with gastroparesis on an insulin pump who presented with intractable nausea and vomiting. Please see admission H&P for further details. There was no acute pathology on his abdominal CT scan and he was not in DKA. He was started on IV reglan and did very well with this. His diet was advanced and he was tolerated a regular diet at the time of discharge. It is noted that he does use cannabis regularly and he should refrain from this as this can cause cyclic vomiting syndrome. He is feeling much better and will be discharged home. Gastroenterology did follow him and he should keep follow up with Dr. Aguayo as scheduled.    Exam Today:  Blood pressure 125/78, pulse 97, temperature 98.1 °F (36.7 °C), temperature source Oral, resp. rate 18, height 175.3 cm (69\"), weight 57.6 kg (126 lb 15.8 oz), SpO2 99 %.  General: awake and alert. No disress  Head and ENT: normocephalic and atraumatic.  Lungs: CTAB, normal effort  Heart: regular rate and rhythm.  Abdomen: soft and nontender with normoactive bowel sounds  Extremities: no clubbing or cyanosis. Intact distal pulses  Neuro: no focal deficits.  Cranial nerves intact.  Skin: Warm " and dry. No rash.  Psych: normal mood and affect.  Musculoskeletal: no deformity or tenderness    Procedures Performed:  CT ABDOMEN PELVIS W CONTRAST-9/5/21   INDICATIONS: Abdominal pain     TECHNIQUE: Radiation dose reduction techniques were utilized, including  automated exposure control and exposure modulation based on body size.  Enhanced ABDOMEN AND PELVIS CT     COMPARISON: 05/23/2021     FINDINGS:     Unremarkable appearance of the liver, gallbladder, spleen, adrenal  glands, pancreas, kidneys, bladder.     No bowel obstruction or abnormal bowel thickening is identified. The  appendix does not appear inflamed, although tiny appendicolithiasis is  apparent. Minimal hiatal hernia.     No free intraperitoneal gas or free fluid. Minimal umbilical hernia of  fat is seen.     Scattered small mesenteric and para-aortic lymph nodes are seen that are  not significant by size criteria.     Abdominal aorta is not aneurysmal.     The lung bases are clear.     No acute fracture is identified.     IMPRESSION:     No acute inflammatory process of bowel is identified, follow up as  indications persist. Tiny appendicolithiasis.     No obstructive uropathy.      Consults:   Consults     Date and Time Order Name Status Description    9/6/2021  4:37 PM Inpatient Gastroenterology Consult Completed     9/6/2021  3:56 PM [facility] (on-call MD unless specified) Details Completed            Discharge Disposition:  Home or Self Care    Discharge Medications:     Discharge Medications      Continue These Medications      Instructions Start Date   Dexcom G6 Sensor   No dose, route, or frequency recorded.      DULoxetine 30 MG capsule  Commonly known as: CYMBALTA   30 mg, Oral, Daily      Glucagon Emergency 1 MG kit   As Needed      Ketone Test strip   1 strip, In Vitro, As Needed      metoclopramide 5 MG tablet  Commonly known as: Reglan   5 mg, Oral, 4 Times Daily Before Meals & Nightly PRN      multivitamin with minerals tablet tablet   1  tablet, Oral, Daily, Chewable gummie       OmniPod Dash 5 Pack Pods misc   0.85 Units, Does not apply, Basal rate recently decreased 1.5 u/hr to 0.85 u/hr  1 unit per 12 carb Correction factor 1unit=45mg/dL Goal 150      ondansetron ODT 4 MG disintegrating tablet  Commonly known as: ZOFRAN-ODT   4 mg, Oral, Every 8 Hours PRN      pantoprazole 40 MG EC tablet  Commonly known as: PROTONIX   40 mg, Oral, 2 Times Daily Before Meals      promethazine 25 MG suppository  Commonly known as: PHENERGAN   25 mg, Rectal, Every 6 Hours PRN      rOPINIRole 0.5 MG tablet  Commonly known as: REQUIP   0.5 mg, Oral, Daily PRN             Discharge Diet:   Diet Instructions     Diet: Regular, Specialty Diet, Soft Texture; Thin Liquids, No Restrictions; Whole; Low Residue      Discharge Diet:  Regular  Specialty Diet  Soft Texture       Fluid Consistency: Thin Liquids, No Restrictions    Soft Options: Whole    Specialty Diets: Low Residue          Activity at Discharge:   Activity Instructions     Activity as Tolerated            Follow-up Appointments:  Future Appointments   Date Time Provider Department Center   10/4/2021 11:15 AM Lena Aguayo MD [facility]     Additional Instructions for the Follow-ups that You Need to Schedule     Discharge Follow-up with PCP   As directed       Currently Documented PCP:    Gale Banegas, [place]    PCP Phone Number:    [phone]     Follow Up Details: 1 week         Discharge Follow-up with Specified Provider: Dr. Aguayo (gastroenterology)   As directed      To: Dr. Aguayo (gastroenterology)    Follow Up Details: as scheduled                Willy Soto MD  09/07/21  13:37 EDT    Time Spent on Discharge Activities: Greater than 30 minutes.

## 2021-09-07 NOTE — CONSULTS
[facility]  Initial Inpatient Consult Note    Referring Provider: Gale JHA    Reason for Consultation: Gastroparesis, nausea with vomiting    Subjective     History of present illness:    31 y.o. male known to Dr. Lena Aguayo her known history of gastroparesis that warrants prokinetic treatment in the form of metoclopramide.  Previous upper endoscopy performed April of this year showed esophagitis.  Last seen in the office setting on July 8 for issues with abdominal pain as well as nausea with vomiting.  Referred to the endocrinology service for management of his diabetes and advised to adjust his metoclopramide as needed for symptom relief.  He was not able to keep the medication down this time and required hospitalization for that purpose.  Since his hospital stay he has noted overall improvement of his symptoms with no current abdominal pain and nausea and vomiting controlled at present.  He is tolerating water but advised to advance his diet slowly as tolerated before considering discharge.  He is followed by [facility] motility clinic for possible pacemaker placement in the future.    Past Medical History:  Past Medical History:   Diagnosis Date   • Anxiety    • Anxiety and depression    • Colon polyp 8/30/2017    1   • Depression    • Diabetes type 1, uncontrolled (CMS/HCC)     diagnosed at age 17   • Dyspepsia    • Family hx of colon cancer    • Gastroparesis    • GERD (gastroesophageal reflux disease)    • GI (gastrointestinal bleed)      Past Surgical History:  Past Surgical History:   Procedure Laterality Date   • COLONOSCOPY N/A 8/30/2017    One 3 mm polyp in the sigmoid colon, Granularity in the terminal ileum   • ENDOSCOPY N/A 4/15/2021    Procedure: ESOPHAGOGASTRODUODENOSCOPY with biopsies;  Surgeon: Da Fleming MD;  Location: [facility] ENDOSCOPY;  Service: Gastroenterology;  Laterality: N/A;  PRE:   nausea, vomiting, hematemesis  POST:  reflux  esophagitis, erosive gastropathy, scalloped mucosa in duodenum ? celiac      Social History:   Social History     Tobacco Use   • Smoking status: Current Every Day Smoker     Packs/day: 0.50     Years: 15.00     Pack years: 7.50     Types: Cigarettes     Start date: 2005   • Smokeless tobacco: Never Used   Substance Use Topics   • Alcohol use: Yes     Comment: 3-4 beers on weekend  rarely      Family History:  Family History   Problem Relation Age of Onset   • Cancer Mother    • Hypertension Father    • Depression Paternal Uncle    • Hypertension Maternal Grandmother    • Irritable bowel syndrome Maternal Grandmother    • Colon cancer Maternal Grandmother    • Hypertension Maternal Grandfather    • Alcohol abuse Maternal Grandfather    • Hypertension Paternal Grandmother    • Cancer Paternal Grandmother    • COPD Paternal Grandmother    • Depression Paternal Grandmother    • Heart disease Paternal Grandfather    • Hypertension Paternal Grandfather        Home Meds:  Medications Prior to Admission   Medication Sig Dispense Refill Last Dose   • Acetone, Urine, Test (Ketone Test) strip 1 strip by In Vitro route As Needed (nausea, vomiting with elevated glucose levels). 50 strip 3    • Continuous Blood Gluc Sensor (Dexcom G6 Sensor)       • DULoxetine (CYMBALTA) 30 MG capsule Take 1 capsule by mouth Daily. 90 capsule 3    • Glucagon, rDNA, (Glucagon Emergency) 1 MG kit As Needed.      • Insulin Disposable Pump (OmniPod Dash 5 Pack Pods) misc 0.85 Units. Basal rate recently decreased 1.5 u/hr to 0.85 u/hr   1 unit per 12 carb  Correction factor 1unit=45mg/dL  Goal 150       • metoclopramide (Reglan) 5 MG tablet Take 1 tablet by mouth 4 (Four) Times a Day Before Meals & at Bedtime As Needed (nausea/vomiting/gatroparesis). 120 tablet 3    • multivitamin with minerals tablet tablet Take 1 tablet by mouth Daily. Chewable gummie      • ondansetron ODT (ZOFRAN-ODT) 4 MG disintegrating tablet Take 4 mg by mouth Every 8 (Eight)  Hours As Needed.      • pantoprazole (PROTONIX) 40 MG EC tablet Take 1 tablet by mouth 2 (Two) Times a Day Before Meals for 180 days. 60 tablet 5    • promethazine (PHENERGAN) 25 MG suppository Insert 1 suppository into the rectum Every 6 (Six) Hours As Needed for Nausea or Vomiting. 20 suppository 0    • rOPINIRole (REQUIP) 0.5 MG tablet Take 0.5 mg by mouth Daily As Needed.        Current Meds:   DULoxetine, 30 mg, Oral, Daily  enoxaparin, 40 mg, Subcutaneous, Q24H  insulin lispro, 0-9 Units, Subcutaneous, TID AC  metoclopramide, 5 mg, Intravenous, 4x Daily AC & at Bedtime  pantoprazole, 40 mg, Oral, BID AC  sodium chloride, 10 mL, Intravenous, Q12H      Allergies:  No Known Allergies  Review of Systems  Pertinent items are noted in HPI, all other systems reviewed and negative     Objective     Vital Signs  Temp:  [97.3 °F (36.3 °C)-98.4 °F (36.9 °C)] 98.1 °F (36.7 °C)  Heart Rate:  [] 97  Resp:  [16-18] 18  BP: (125-176)/() 125/78  Physical Exam:  General Appearance:    Alert, cooperative, in no acute distress   Head:    Normocephalic, without obvious abnormality, atraumatic   Eyes:          conjunctivae and sclerae normal, no   icterus   Throat:   no thrush, oral mucosa moist   Neck:   Supple, no adenopathy   Lungs:     Clear to auscultation bilaterally    Heart:    Regular rhythm and normal rate    Chest Wall:    No abnormalities observed   Abdomen:     Soft, nondistended, nontender; normal bowel sounds   Extremities:   no edema, no redness   Skin:   No bruising or rash   Psychiatric:  normal mood and insight     Results Review:   I reviewed the patient's new clinical results.    Results from last 7 days   Lab Units 09/07/21  0626 09/06/21  1300   WBC 10*3/mm3 12.78* 16.92*   HEMOGLOBIN g/dL 12.0* 13.8   HEMATOCRIT % 35.8* 42.0   PLATELETS 10*3/mm3 295 342     Results from last 7 days   Lab Units 09/07/21  0626 09/06/21  1300   SODIUM mmol/L 141 140   POTASSIUM mmol/L 3.8 4.1   CHLORIDE mmol/L 106  100   CO2 mmol/L 23.6 27.2   BUN mg/dL 9 15   CREATININE mg/dL 0.65* 0.88   CALCIUM mg/dL 8.4* 9.6   BILIRUBIN mg/dL  --  0.4   ALK PHOS U/L  --  78   ALT (SGPT) U/L  --  20   AST (SGOT) U/L  --  17   GLUCOSE mg/dL 83 227*         Lab Results   Lab Value Date/Time    LIPASE 33 09/06/2021 1300    LIPASE 16 08/24/2021 1637    LIPASE 16 07/03/2021 1741    LIPASE 15 05/23/2021 1328    LIPASE 11 (L) 04/22/2021 2241    LIPASE 23 04/14/2021 1831    LIPASE 12 (L) 03/20/2021 1834       Radiology:  CT Abdomen Pelvis With Contrast   Final Result       No acute inflammatory process of bowel is identified, follow up as   indications persist. Tiny appendicolithiasis.       No obstructive uropathy.       This report was finalized on 9/6/2021 3:08 PM by Dr. Trenton Hill M.D.              Assessment/Plan   Patient Active Problem List   Diagnosis   • Type 1 diabetes mellitus with hyperglycemia (CMS/HCC)   • Fecal incontinence   • Erectile dysfunction   • Anxiety and depression   • Hyperplastic colonic polyp   • Gastroparesis   • Family history of colorectal cancer   • Sepsis without acute organ dysfunction (CMS/HCC)   • Alcohol abuse   • Tobacco abuse   • Acute upper GI bleed   • Non-intractable vomiting with nausea   • Leukocytosis   • Marijuana abuse   • Intractable nausea and vomiting       Assessment:  1. Gastroparesis: Being managed with prokinetic agent  2. Nausea with vomiting: Part of his delayed emptying process  3. GERD: On PPI  4. Marijuana use: May be contributing to some of his symptoms with cyclic vomiting syndrome    Plan:  · Continue current medical regimen  · Advance diet to soft low residue as tolerated  · Can discharge from GI perspective if able to tolerate p.o. intake and follow-up routinely with Dr. Aguayo and with [facility] motility service.      I discussed the patients findings and my recommendations with patient and RN.    Garrison Hernandez MD

## 2021-09-07 NOTE — OUTREACH NOTE
Prep Survey      Responses   [facility] patient discharged from?  [place]   Is LACE score < 7 ?  Yes   Emergency Room discharge w/ pulse ox?  No   Eligibility  [facility]   Date of Admission  09/06/21   Date of Discharge  09/07/21   Discharge Disposition  Home or Self Care   Discharge diagnosis  type 1 DM with gastroparesis on an insulin pump who presented with intractable nausea and vomiting   Does the patient have one of the following disease processes/diagnoses(primary or secondary)?  Other   Does the patient have Home health ordered?  No   Is there a DME ordered?  No   Prep survey completed?  Yes          Padmini Miller RN

## 2021-09-07 NOTE — CASE MANAGEMENT/SOCIAL WORK
Discharge Planning Assessment  [facility]     Patient Name: Morro Blancas  MRN: [id number]  Today's Date: 9/7/2021    Admit Date: 9/6/2021    Discharge Needs Assessment     Row Name 09/07/21 0819       Living Environment    Lives With  parent(s)    Current Living Arrangements  home/apartment/condo    Potentially Unsafe Housing Conditions  other (see comments) no concerns    Primary Care Provided by  self    Family Caregiver if Needed  parent(s)    Quality of Family Relationships  helpful;involved;supportive    Able to Return to Prior Arrangements  yes       Resource/Environmental Concerns    Resource/Environmental Concerns  none    Transportation Concerns  car, none       Transition Planning    Patient/Family Anticipates Transition to  home with family    Patient/Family Anticipated Services at Transition  none    Transportation Anticipated  family or friend will provide       Discharge Needs Assessment    Readmission Within the Last 30 Days  no previous admission in last 30 days    Equipment Currently Used at Home  glucometer    Concerns to be Addressed  no discharge needs identified;denies needs/concerns at this time    Anticipated Changes Related to Illness  none    Equipment Needed After Discharge  none        Discharge Plan     Row Name 09/07/21 0819       Plan    Plan  Home    Patient/Family in Agreement with Plan  yes    Plan Comments  CCP spoke with patient. CCP role explained and discharge planning discussed. Face sheet verified and patient’s APRN is Gale Tran. Patient lives with his parents. Patient is independent with his ADLs and does not use DME. Patient has glucometer and needed diabetic supplies to check his blood sugar 3-4xs a day. Patient has not used home health or been to SNF. Patient’s preferred pharmacy is [facility] on [address]; patient denies having trouble obtaining his medications. Patient agreeable to meds to beds. Patient plans to return home and has transportation. CCP will follow for any  needs to arise. Marine GARG        Continued Care and Services - Admitted Since 9/6/2021    Coordination has not been started for this encounter.         Demographic Summary     Row Name 09/07/21 0818       General Information    Admission Type  observation    Arrived From  emergency department    Referral Source  admission list    Reason for Consult  discharge planning    Preferred Language  English     Used During This Interaction  no        Functional Status     Row Name 09/07/21 0819       Functional Status    Usual Activity Tolerance  good    Current Activity Tolerance  good       Functional Status, IADL    Medications  independent    Meal Preparation  independent    Housekeeping  independent    Laundry  independent    Shopping  independent       Mental Status    General Appearance WDL  WDL       Mental Status Summary    Recent Changes in Mental Status/Cognitive Functioning  no changes        Psychosocial    No documentation.       Abuse/Neglect    No documentation.       Legal    No documentation.       Substance Abuse    No documentation.       Patient Forms    No documentation.           BRITANY Sue

## 2021-09-08 ENCOUNTER — TRANSITIONAL CARE MANAGEMENT TELEPHONE ENCOUNTER (OUTPATIENT)
Dept: CALL CENTER | Facility: HOSPITAL | Age: 31
End: 2021-09-08

## 2021-09-08 ENCOUNTER — HOSPITAL ENCOUNTER (EMERGENCY)
Facility: HOSPITAL | Age: 31
Discharge: HOME | End: 2021-09-08
Attending: EMERGENCY MEDICINE | Admitting: EMERGENCY MEDICINE

## 2021-09-08 VITALS
DIASTOLIC BLOOD PRESSURE: 103 MMHG | RESPIRATION RATE: 14 BRPM | TEMPERATURE: 99.2 F | HEART RATE: 104 BPM | OXYGEN SATURATION: 100 % | SYSTOLIC BLOOD PRESSURE: 164 MMHG

## 2021-09-08 DIAGNOSIS — R03.0 ELEVATED BLOOD PRESSURE READING: ICD-10-CM

## 2021-09-08 DIAGNOSIS — R11.2 NAUSEA AND VOMITING, INTRACTABILITY OF VOMITING NOT SPECIFIED, UNSPECIFIED VOMITING TYPE: Primary | ICD-10-CM

## 2021-09-08 DIAGNOSIS — Z86.39 HISTORY OF DIABETIC GASTROPARESIS: ICD-10-CM

## 2021-09-08 LAB
ALBUMIN SERPL-MCNC: 4.7 G/DL (ref 3.5–5.2)
ALBUMIN/GLOB SERPL: 2 G/DL
ALP SERPL-CCNC: 64 U/L (ref 39–117)
ALT SERPL W P-5'-P-CCNC: 22 U/L (ref 1–41)
ANION GAP SERPL CALCULATED.3IONS-SCNC: 13.1 MMOL/L (ref 5–15)
AST SERPL-CCNC: 15 U/L (ref 1–40)
BASOPHILS # BLD AUTO: 0.02 10*3/MM3 (ref 0–0.2)
BASOPHILS NFR BLD AUTO: 0.2 % (ref 0–1.5)
BILIRUB SERPL-MCNC: 0.9 MG/DL (ref 0–1.2)
BUN SERPL-MCNC: 11 MG/DL (ref 6–20)
BUN/CREAT SERPL: 13.8 (ref 7–25)
CALCIUM SPEC-SCNC: 9.5 MG/DL (ref 8.6–10.5)
CHLORIDE SERPL-SCNC: 103 MMOL/L (ref 98–107)
CO2 SERPL-SCNC: 22.9 MMOL/L (ref 22–29)
CREAT SERPL-MCNC: 0.8 MG/DL (ref 0.76–1.27)
DEPRECATED RDW RBC AUTO: 41.6 FL (ref 37–54)
EOSINOPHIL # BLD AUTO: 0.02 10*3/MM3 (ref 0–0.4)
EOSINOPHIL NFR BLD AUTO: 0.2 % (ref 0.3–6.2)
ERYTHROCYTE [DISTWIDTH] IN BLOOD BY AUTOMATED COUNT: 12.8 % (ref 12.3–15.4)
GFR SERPL CREATININE-BSD FRML MDRD: 113 ML/MIN/1.73
GLOBULIN UR ELPH-MCNC: 2.4 GM/DL
GLUCOSE SERPL-MCNC: 210 MG/DL (ref 65–99)
HCT VFR BLD AUTO: 38.5 % (ref 37.5–51)
HGB BLD-MCNC: 12.6 G/DL (ref 13–17.7)
IMM GRANULOCYTES # BLD AUTO: 0.03 10*3/MM3 (ref 0–0.05)
IMM GRANULOCYTES NFR BLD AUTO: 0.3 % (ref 0–0.5)
LIPASE SERPL-CCNC: 26 U/L (ref 13–60)
LYMPHOCYTES # BLD AUTO: 1.31 10*3/MM3 (ref 0.7–3.1)
LYMPHOCYTES NFR BLD AUTO: 11.8 % (ref 19.6–45.3)
MCH RBC QN AUTO: 29 PG (ref 26.6–33)
MCHC RBC AUTO-ENTMCNC: 32.7 G/DL (ref 31.5–35.7)
MCV RBC AUTO: 88.7 FL (ref 79–97)
MONOCYTES # BLD AUTO: 0.47 10*3/MM3 (ref 0.1–0.9)
MONOCYTES NFR BLD AUTO: 4.2 % (ref 5–12)
NEUTROPHILS NFR BLD AUTO: 83.3 % (ref 42.7–76)
NEUTROPHILS NFR BLD AUTO: 9.25 10*3/MM3 (ref 1.7–7)
NRBC BLD AUTO-RTO: 0 /100 WBC (ref 0–0.2)
PLATELET # BLD AUTO: 314 10*3/MM3 (ref 140–450)
PMV BLD AUTO: 10.3 FL (ref 6–12)
POTASSIUM SERPL-SCNC: 3.9 MMOL/L (ref 3.5–5.2)
PROT SERPL-MCNC: 7.1 G/DL (ref 6–8.5)
RBC # BLD AUTO: 4.34 10*6/MM3 (ref 4.14–5.8)
SODIUM SERPL-SCNC: 139 MMOL/L (ref 136–145)
WBC # BLD AUTO: 11.1 10*3/MM3 (ref 3.4–10.8)

## 2021-09-08 PROCEDURE — 99284 EMERGENCY DEPT VISIT MOD MDM: CPT

## 2021-09-08 PROCEDURE — 96375 TX/PRO/DX INJ NEW DRUG ADDON: CPT

## 2021-09-08 PROCEDURE — 96374 THER/PROPH/DIAG INJ IV PUSH: CPT

## 2021-09-08 PROCEDURE — 80053 COMPREHEN METABOLIC PANEL: CPT | Performed by: EMERGENCY MEDICINE

## 2021-09-08 PROCEDURE — 83690 ASSAY OF LIPASE: CPT | Performed by: EMERGENCY MEDICINE

## 2021-09-08 PROCEDURE — 85025 COMPLETE CBC W/AUTO DIFF WBC: CPT | Performed by: EMERGENCY MEDICINE

## 2021-09-08 PROCEDURE — 25010000002 DROPERIDOL PER 5 MG: Performed by: EMERGENCY MEDICINE

## 2021-09-08 RX ORDER — LIDOCAINE HYDROCHLORIDE 20 MG/ML
15 SOLUTION OROPHARYNGEAL ONCE
Status: DISCONTINUED | OUTPATIENT
Start: 2021-09-08 | End: 2021-09-08 | Stop reason: HOSPADM

## 2021-09-08 RX ORDER — DROPERIDOL 2.5 MG/ML
2.5 INJECTION, SOLUTION INTRAMUSCULAR; INTRAVENOUS ONCE
Status: COMPLETED | OUTPATIENT
Start: 2021-09-08 | End: 2021-09-08

## 2021-09-08 RX ORDER — SODIUM CHLORIDE 0.9 % (FLUSH) 0.9 %
10 SYRINGE (ML) INJECTION AS NEEDED
Status: DISCONTINUED | OUTPATIENT
Start: 2021-09-08 | End: 2021-09-08 | Stop reason: HOSPADM

## 2021-09-08 RX ORDER — FAMOTIDINE 10 MG/ML
20 INJECTION, SOLUTION INTRAVENOUS ONCE
Status: COMPLETED | OUTPATIENT
Start: 2021-09-08 | End: 2021-09-08

## 2021-09-08 RX ORDER — ALUMINA, MAGNESIA, AND SIMETHICONE 2400; 2400; 240 MG/30ML; MG/30ML; MG/30ML
15 SUSPENSION ORAL ONCE
Status: DISCONTINUED | OUTPATIENT
Start: 2021-09-08 | End: 2021-09-08 | Stop reason: HOSPADM

## 2021-09-08 RX ADMIN — SODIUM CHLORIDE, POTASSIUM CHLORIDE, SODIUM LACTATE AND CALCIUM CHLORIDE 1000 ML: 600; 310; 30; 20 INJECTION, SOLUTION INTRAVENOUS at 11:39

## 2021-09-08 RX ADMIN — FAMOTIDINE 20 MG: 10 INJECTION INTRAVENOUS at 11:41

## 2021-09-08 RX ADMIN — DROPERIDOL 2.5 MG: 2.5 INJECTION, SOLUTION INTRAMUSCULAR; INTRAVENOUS at 11:41

## 2021-09-08 NOTE — ED PROVIDER NOTES
MD ATTESTATION NOTE    The ZARIA and I have discussed this patient's history, physical exam, and treatment plan.  I have reviewed the documentation and personally had a face to face interaction with the patient. I affirm the documentation and agree with the treatment and plan.  The attached note describes my personal findings.      Morro Blancas is a 31 y.o. male who presents to the ED c/o vomiting and diarrhea.  Symptoms began approximately 1 day ago.  They have been persistent.  Nothing makes this better or worse.      On exam:  Patient is recurrently having emesis  Abdomen soft, generalized tenderness, no rebound or guarding    Labs  Recent Results (from the past 24 hour(s))   Basic Metabolic Panel    Collection Time: 09/07/21  6:26 AM    Specimen: Blood   Result Value Ref Range    Glucose 83 65 - 99 mg/dL    BUN 9 6 - 20 mg/dL    Creatinine 0.65 (L) 0.76 - 1.27 mg/dL    Sodium 141 136 - 145 mmol/L    Potassium 3.8 3.5 - 5.2 mmol/L    Chloride 106 98 - 107 mmol/L    CO2 23.6 22.0 - 29.0 mmol/L    Calcium 8.4 (L) 8.6 - 10.5 mg/dL    eGFR Non African Amer 143 >60 mL/min/1.73    BUN/Creatinine Ratio 13.8 7.0 - 25.0    Anion Gap 11.4 5.0 - 15.0 mmol/L   CBC Auto Differential    Collection Time: 09/07/21  6:26 AM    Specimen: Blood   Result Value Ref Range    WBC 12.78 (H) 3.40 - 10.80 10*3/mm3    RBC 4.09 (L) 4.14 - 5.80 10*6/mm3    Hemoglobin 12.0 (L) 13.0 - 17.7 g/dL    Hematocrit 35.8 (L) 37.5 - 51.0 %    MCV 87.5 79.0 - 97.0 fL    MCH 29.3 26.6 - 33.0 pg    MCHC 33.5 31.5 - 35.7 g/dL    RDW 13.4 12.3 - 15.4 %    RDW-SD 43.0 37.0 - 54.0 fl    MPV 10.3 6.0 - 12.0 fL    Platelets 295 140 - 450 10*3/mm3    Neutrophil % 67.9 42.7 - 76.0 %    Lymphocyte % 23.8 19.6 - 45.3 %    Monocyte % 7.7 5.0 - 12.0 %    Eosinophil % 0.1 (L) 0.3 - 6.2 %    Basophil % 0.3 0.0 - 1.5 %    Immature Grans % 0.2 0.0 - 0.5 %    Neutrophils, Absolute 8.67 (H) 1.70 - 7.00 10*3/mm3    Lymphocytes, Absolute 3.04 0.70 - 3.10 10*3/mm3    Monocytes,  Absolute 0.99 (H) 0.10 - 0.90 10*3/mm3    Eosinophils, Absolute 0.01 0.00 - 0.40 10*3/mm3    Basophils, Absolute 0.04 0.00 - 0.20 10*3/mm3    Immature Grans, Absolute 0.03 0.00 - 0.05 10*3/mm3    nRBC 0.0 0.0 - 0.2 /100 WBC   POC Glucose Once    Collection Time: 09/07/21  7:41 AM    Specimen: Blood   Result Value Ref Range    Glucose 81 70 - 130 mg/dL       Radiology  No Radiology Exams Resulted Within Past 24 Hours    Medical Decision Making:  ED Course as of Sep 08 0204   Mon Sep 06, 2021   1308 BP: 140/94 [RC]   1308 Temp: 97.3 °F (36.3 °C) [RC]   1308 Heart Rate: 93 [RC]   1308 Resp: 16 [RC]   1308 RA   SpO2: 98 % [RC]   1321 WBC(!): 16.92 [RC]   1321 White count with left shift is noted.  Given the physical exam will order a CT of the abdomen pelvis with IV contrast.   Neutrophils Absolute(!): 13.43 [RC]   1343 Patient is now had 4 mg of Zofran, 10 of Reglan and continues to actively and frequently vomit.  Will order 2 mg of Haldol    [RC]   1344 Glucose(!): 227 [RC]   1344 BUN: 15 [RC]   1344 Creatinine: 0.88 [RC]   1344 CO2: 27.2 [RC]   1344 I did not actively smell ketones on the patient.  CO2 and anion gap are normal.  I doubt DKA.  CT is still pending.   Anion Gap: 12.8 [RC]   1345 Patient receiving third antiemetic.  I feel this patient will likely need to be admitted due to intractable nausea vomiting.  We will go ahead and order COVID-19 testing at this time.    [RC]   1409 Patient has had 4 mg of Zofran, 10 mg of Reglan, 5 mg of Haldol and continues to have active emesis and dry heaves.  Will order 4 more milligrams of Zofran and 12.5 of Phenergan.    [RC]   1456 Still awaiting official read on CT abdomen pelvis contrast.  Patient is now resting quietly.  If the CT is negative for an acute process, I plan to orally challenge to determine disposition.    [RC]   1550 CT abdomen pelvis with contrast shows no acute process.  Of note there is an appendicolith but there is no noted appendicitis.  Clinical  picture also does not correspond with appendicitis.    [RC]   1600 COVID19: Not Detected [RC]   1609 Working diagnosis at this point is intractable nausea and vomiting, diarrhea, gastroparesis.    Discussed patient's case with Dr. Silveira who will accept the patient in a [facility] bed.    [RC]      ED Course User Index  [RC] Jose Angel Matias III, PA           PPE: Both the patient and I wore a surgical mask throughout the entire patient encounter. I wore protective goggles.     Diagnosis  Final diagnoses:   Gastroparesis   Intractable vomiting with nausea   Diarrhea, unspecified type        Liban Terry II, MD  09/08/21 0207

## 2021-09-08 NOTE — ED NOTES
"Attempted to discharge patient but he reports he is still vomiting \"black\"     Katie Victor, RN  09/08/21 1257    "

## 2021-09-08 NOTE — OUTREACH NOTE
Call Center TCM Note      Responses   [facility] patient discharged from?  [place]   Does the patient have one of the following disease processes/diagnoses(primary or secondary)?  Other   TCM attempt successful?  No   Unsuccessful attempts  Attempt 2          Lyudmila Toth RN    9/8/2021, 15:03 EDT

## 2021-09-08 NOTE — OUTREACH NOTE
Call Center TCM Note      Responses   [facility] patient discharged from?  [place]   Does the patient have one of the following disease processes/diagnoses(primary or secondary)?  Other   TCM attempt successful?  No [Julienne and Mahsa on verbal release ]   Unsuccessful attempts  Attempt 1          Lyudmila Toth RN    9/8/2021, 13:53 EDT

## 2021-09-08 NOTE — ED PROVIDER NOTES
EMERGENCY DEPARTMENT ENCOUNTER    Room Number:  21/21  Date of encounter:  9/8/2021  PCP: Gale Banegas, YUDELKA  Historian: Pt    Patient was placed in face mask during triage process. Patient was wearing facemask when I entered the room and throughout our encounter. I wore full protective equipment throughout this patient encounter including a face mask, eye protection, and gloves. Hand hygiene was performed before donning protective equipment and again following doffing of PPE after leaving the room.    HPI:  Chief Complaint: Vomiting blood this morning  A complete HPI/ROS/PMH/PSH/SH/FH are unobtainable due to: N/A   Context: Morro Blancas is a 31 y.o. male who presents to the ED c/o continued nausea with vomiting up some blood today.  No significant pain.  No exacerbating or relieving factors identified.  No fever, chest pain.  0 out of 10 discomfort.  Patient released from hospital yesterday following admission for nausea vomiting with noted marijuana abuse and gastroparesis in his history.    MEDICAL HISTORY REVIEW  Date of Admission: 9/6/2021  Date of Discharge:  9/7/2021  Primary Care Physician: Gale Banegas, YUDELKA   Discharge Diagnosis:        Active Hospital Problems     Diagnosis   POA   • Marijuana abuse [F12.10]   Yes   • Tobacco abuse [Z72.0]   Yes   • Gastroparesis [K31.84]   Yes   • Family history of colorectal cancer [Z80.0]   Not Applicable   • Type 1 diabetes mellitus with hyperglycemia (CMS/HCC) [E10.65]   Yes   • Anxiety and depression [F41.9, F32.9]     _____________________________  EGD 4/15/2021:  - Esophageal plaques were found, suspicious for candidiasis. Cells for cytology obtained.  - LA Grade B reflux esophagitis.  - Non-bleeding erosive gastropathy. Biopsied.  - Scalloped mucosa was found in the duodenum, suspicious for celiac disease. Biopsied.      PAST MEDICAL HISTORY  Active Ambulatory Problems     Diagnosis Date Noted   • Type 1 diabetes mellitus with hyperglycemia (CMS/HCC)  06/15/2017   • Fecal incontinence 06/15/2017   • Erectile dysfunction 06/15/2017   • Anxiety and depression 06/15/2017   • Hyperplastic colonic polyp 11/04/2020   • Gastroparesis 11/04/2020   • Family history of colorectal cancer 11/04/2020   • Sepsis without acute organ dysfunction (CMS/HCC) 03/21/2021   • Alcohol abuse 03/21/2021   • Tobacco abuse 03/21/2021   • Acute upper GI bleed 04/15/2021   • Non-intractable vomiting with nausea 05/23/2021   • Leukocytosis 05/24/2021   • Marijuana abuse 05/26/2021     Resolved Ambulatory Problems     Diagnosis Date Noted   • Intractable vomiting 03/20/2021   • DKA (diabetic ketoacidoses) (CMS/[facility]) 07/06/2021   • Intractable nausea and vomiting 09/06/2021     Past Medical History:   Diagnosis Date   • Anxiety    • Colon polyp 8/30/2017   • Depression    • Diabetes type 1, uncontrolled (CMS/[facility])    • Dyspepsia    • Family hx of colon cancer    • GERD (gastroesophageal reflux disease)    • GI (gastrointestinal bleed)          PAST SURGICAL HISTORY  Past Surgical History:   Procedure Laterality Date   • COLONOSCOPY N/A 8/30/2017    One 3 mm polyp in the sigmoid colon, Granularity in the terminal ileum   • ENDOSCOPY N/A 4/15/2021    Procedure: ESOPHAGOGASTRODUODENOSCOPY with biopsies;  Surgeon: Da Fleming MD;  Location: [facility] ENDOSCOPY;  Service: Gastroenterology;  Laterality: N/A;  PRE:   nausea, vomiting, hematemesis  POST:  reflux esophagitis, erosive gastropathy, scalloped mucosa in duodenum ? celiac         FAMILY HISTORY  Family History   Problem Relation Age of Onset   • Cancer Mother    • Hypertension Father    • Depression Paternal Uncle    • Hypertension Maternal Grandmother    • Irritable bowel syndrome Maternal Grandmother    • Colon cancer Maternal Grandmother    • Hypertension Maternal Grandfather    • Alcohol abuse Maternal Grandfather    • Hypertension Paternal Grandmother    • Cancer Paternal Grandmother    • COPD Paternal Grandmother    • Depression  Paternal Grandmother    • Heart disease Paternal Grandfather    • Hypertension Paternal Grandfather          SOCIAL HISTORY  Social History     Socioeconomic History   • Marital status: Significant Other     Spouse name: Not on file   • Number of children: Not on file   • Years of education: Not on file   • Highest education level: Not on file   Tobacco Use   • Smoking status: Current Every Day Smoker     Packs/day: 0.50     Years: 15.00     Pack years: 7.50     Types: Cigarettes     Start date: 2005   • Smokeless tobacco: Never Used   Vaping Use   • Vaping Use: Never used   Substance and Sexual Activity   • Alcohol use: Yes     Comment: 3-4 beers on weekend  rarely   • Drug use: Yes     Frequency: 7.0 times per week     Types: Marijuana     Comment: DAILY   • Sexual activity: Yes     Partners: Female         ALLERGIES  Patient has no known allergies.        REVIEW OF SYSTEMS  Review of Systems     All systems reviewed and negative except for those discussed in HPI.       PHYSICAL EXAM    I have reviewed the triage vital signs and nursing notes.    ED Triage Vitals [09/08/21 1028]   Temp Heart Rate Resp BP SpO2   99.2 °F (37.3 °C) 92 18 (!) 196/99 100 %      Temp src Heart Rate Source Patient Position BP Location FiO2 (%)   Tympanic Monitor Lying -- --       Physical Exam    Physical Exam   Constitutional: No distress.  Nontoxic  HENT:  Head: Normocephalic and atraumatic.   Oropharynx: Mucous membranes are moist.   Eyes: No scleral icterus. No conjunctival pallor.  Neck: Painless range of motion noted. Neck supple.   Cardiovascular: Normal rate, regular rhythm and intact distal pulses.  Pulmonary/Chest: No respiratory distress. There are no wheezes, no rhonchi, and no rales.   Abdominal: Soft. There is no tenderness. There is no rebound and no guarding.   Musculoskeletal: Moves all extremities equally. There is no pedal edema or calf tenderness.   Neurological: Alert.  Baseline strength and sensation noted.   Skin:  Skin is pink, warm, and dry. No pallor.   Psychiatric: Mood and affect normal.   Nursing note and vitals reviewed.    LAB RESULTS  Recent Results (from the past 24 hour(s))   Comprehensive Metabolic Panel    Collection Time: 09/08/21 11:36 AM    Specimen: Blood   Result Value Ref Range    Glucose 210 (H) 65 - 99 mg/dL    BUN 11 6 - 20 mg/dL    Creatinine 0.80 0.76 - 1.27 mg/dL    Sodium 139 136 - 145 mmol/L    Potassium 3.9 3.5 - 5.2 mmol/L    Chloride 103 98 - 107 mmol/L    CO2 22.9 22.0 - 29.0 mmol/L    Calcium 9.5 8.6 - 10.5 mg/dL    Total Protein 7.1 6.0 - 8.5 g/dL    Albumin 4.70 3.50 - 5.20 g/dL    ALT (SGPT) 22 1 - 41 U/L    AST (SGOT) 15 1 - 40 U/L    Alkaline Phosphatase 64 39 - 117 U/L    Total Bilirubin 0.9 0.0 - 1.2 mg/dL    eGFR Non African Amer 113 >60 mL/min/1.73    Globulin 2.4 gm/dL    A/G Ratio 2.0 g/dL    BUN/Creatinine Ratio 13.8 7.0 - 25.0    Anion Gap 13.1 5.0 - 15.0 mmol/L   Lipase    Collection Time: 09/08/21 11:36 AM    Specimen: Blood   Result Value Ref Range    Lipase 26 13 - 60 U/L   CBC Auto Differential    Collection Time: 09/08/21 11:36 AM    Specimen: Blood   Result Value Ref Range    WBC 11.10 (H) 3.40 - 10.80 10*3/mm3    RBC 4.34 4.14 - 5.80 10*6/mm3    Hemoglobin 12.6 (L) 13.0 - 17.7 g/dL    Hematocrit 38.5 37.5 - 51.0 %    MCV 88.7 79.0 - 97.0 fL    MCH 29.0 26.6 - 33.0 pg    MCHC 32.7 31.5 - 35.7 g/dL    RDW 12.8 12.3 - 15.4 %    RDW-SD 41.6 37.0 - 54.0 fl    MPV 10.3 6.0 - 12.0 fL    Platelets 314 140 - 450 10*3/mm3    Neutrophil % 83.3 (H) 42.7 - 76.0 %    Lymphocyte % 11.8 (L) 19.6 - 45.3 %    Monocyte % 4.2 (L) 5.0 - 12.0 %    Eosinophil % 0.2 (L) 0.3 - 6.2 %    Basophil % 0.2 0.0 - 1.5 %    Immature Grans % 0.3 0.0 - 0.5 %    Neutrophils, Absolute 9.25 (H) 1.70 - 7.00 10*3/mm3    Lymphocytes, Absolute 1.31 0.70 - 3.10 10*3/mm3    Monocytes, Absolute 0.47 0.10 - 0.90 10*3/mm3    Eosinophils, Absolute 0.02 0.00 - 0.40 10*3/mm3    Basophils, Absolute 0.02 0.00 - 0.20 10*3/mm3     Immature Grans, Absolute 0.03 0.00 - 0.05 10*3/mm3    nRBC 0.0 0.0 - 0.2 /100 WBC       Ordered the above labs and independently reviewed the results.        RADIOLOGY  No Radiology Exams Resulted Within Past 24 Hours    I ordered the above noted radiological studies. Reviewed by me and discussed with radiologist.  See dictation for official radiology interpretation.      PROCEDURES    Procedures        MEDICATIONS GIVEN IN ER    Medications   sodium chloride 0.9 % flush 10 mL (has no administration in time range)   aluminum-magnesium hydroxide-simethicone (MAALOX MAX) 400-400-40 MG/5ML suspension 15 mL (has no administration in time range)   Lidocaine Viscous HCl (XYLOCAINE) 2 % mouth solution 15 mL (has no administration in time range)   lactated ringers bolus 1,000 mL (1,000 mL Intravenous New Bag 9/8/21 1139)   famotidine (PEPCID) injection 20 mg (20 mg Intravenous Given 9/8/21 1141)   droperidol (INAPSINE) injection 2.5 mg (2.5 mg Intravenous Given 9/8/21 1141)         PROGRESS, DATA ANALYSIS, CONSULTS, AND MEDICAL DECISION MAKING    Patient with benign abdominal exam and no crepitus of the neck suggests simple -Olmedo as opposed to Boerhaave's.  Patient's history.  Treat symptomatically with plan for discharge back home following IV rehydration.  Patient will need further gastroenterology follow-up.    All labs have been independently reviewed by me.  All radiology studies have been reviewed by me and discussed with radiologist dictating the report.   EKG's independently viewed and interpreted by me.  Discussion below represents my analysis of pertinent findings related to patient's condition, differential diagnosis, treatment plan and final disposition.      ED Course as of Sep 08 1243   Wed Sep 08, 2021   1239 Hemoglobin(!): 12.6 [RS]   1239 BUN: 11 [RS]   1239 Creatinine: 0.80 [RS]   1239 Glucose(!): 210 [RS]   1239 CO2: 22.9 [RS]   1239 Lipase: 26 [RS]   1242 Patient sleeping after medications.   No recurrent vomiting.  Reviewed labs with patient.  No acute life threats identified.  Patient's BP slightly elevated at this time will need outpatient follow-up.    [RS]      ED Course User Index  [RS] Jabier Herring MD       AS OF 12:43 EDT VITALS:    BP - (!) 182/100  HR - 92  TEMP - 99.2 °F (37.3 °C) (Tympanic)  O2 SATS - 100%        DIAGNOSIS  Final diagnoses:   Nausea and vomiting, intractability of vomiting not specified, unspecified vomiting type   Elevated blood pressure reading   History of diabetic gastroparesis         DISPOSITION  DISCHARGE    Patient discharged in stable condition.    Reviewed implications of results, diagnosis, meds, responsibility to follow up, warning signs and symptoms of possible worsening, potential complications and reasons to return to ER.    Patient/Family voiced understanding of above instructions.    Discussed plan for discharge, as there is no emergent indication for admission. Patient referred to primary care provider for regular health maintenance. Pt/family is agreeable and understands need for follow up and possible repeat testing.  Pt is aware that discharge does not mean that nothing is wrong but it indicates no emergency is present that requires admission and they must continue care with follow-up as given below or physician of their choice.     FOLLOW-UP  Gale Banegas, APRN  [address]  [address]  [facility] [id number] [phone]    Schedule an appointment as soon as possible for a visit   As needed    Da Fleming MD  [address]  [address]  [facility] [id number] [phone]    Schedule an appointment as soon as possible for a visit            Medication List      No changes were made to your prescriptions during this visit.            Jabier Herring MD  09/08/21 0108

## 2021-09-08 NOTE — ED TRIAGE NOTES
Pt arrives via EMS from home. Pt was discharged yesterday for gastroparesis. States that he started to have vomiting again last night. Reports that he has blood in his vomit. Pt does have DM and has an insulin pump. Patient masked at arrival and triage staff wore all appropriate PPE during entire encounter with patient.

## 2021-09-09 ENCOUNTER — TRANSITIONAL CARE MANAGEMENT TELEPHONE ENCOUNTER (OUTPATIENT)
Dept: CALL CENTER | Facility: HOSPITAL | Age: 31
End: 2021-09-09

## 2021-09-09 NOTE — OUTREACH NOTE
Call Center TCM Note      Responses   [facility] patient discharged from?  [facility]   Does the patient have one of the following disease processes/diagnoses(primary or secondary)?  Other   TCM attempt successful?  No   Unsuccessful attempts  Attempt 3          Lyudmila Toth RN    9/9/2021, 08:23 EDT

## 2021-09-16 ENCOUNTER — TELEPHONE (OUTPATIENT)
Dept: GASTROENTEROLOGY | Facility: CLINIC | Age: 31
End: 2021-09-16

## 2021-09-16 ENCOUNTER — TELEPHONE (OUTPATIENT)
Dept: ENDOCRINOLOGY | Facility: CLINIC | Age: 31
End: 2021-09-16

## 2021-09-16 NOTE — TELEPHONE ENCOUNTER
Called to schedule NP referral. Pt in the hospital and will call back for appt. Pts girlfrienpat Shah (HIPAA approved) called back to schedule appt. Pt tx care from other endo- will see the endo of this mo & fu with us. DME declined. Instructions office address and contact info given. Paperwork mailed.

## 2021-09-16 NOTE — TELEPHONE ENCOUNTER
Call from Jarrod with [facility] at [phone] - cannot accept pt.  Director limiting acceptance of pt's for infusion.  Suggests [facility].      Call to [facility] @ [phone] and spoke with Samantha.  Per instructions, last couple progress notes, copy of insurance card, MD order, and demographic info faxed to  - confirmation received.  See media tab.

## 2021-09-16 NOTE — TELEPHONE ENCOUNTER
Call from Asiya with [facility].  Cannot accept pt.      Attempt call to [employer] @ [phone] - unable to dial thru.  Call to [facility]  @ [phone] and spoke with Conchis.  States [employer]'s phones are down.  Request to initiate intake for IVF.  Conchis took initial demographic info and will email [employer] to contact this RN.

## 2021-09-16 NOTE — TELEPHONE ENCOUNTER
Call to [facility] @ [phone] and spoke with Asiya.  Checking about referral.  She will research and call back ASAP.     Message to DR Aguayo.

## 2021-09-16 NOTE — TELEPHONE ENCOUNTER
Call from Zoila with [employer] [facility]ia[facility] @ [phone].  Per request, referral info faxed to [phone] - confirmation received.  See media tab.      Call to pt.  Advise that DR Aguayo arranging for Home Health 1x/wk for hydration and IV zofran.  Be alert from call from either [employer] or [facility] to arrange.      Contact office if any issues.  Verb understanding.     Update to Dr Aguayo.

## 2021-09-16 NOTE — TELEPHONE ENCOUNTER
----- Message from Lena Aguayo MD sent at 9/15/2021  5:10 PM EDT -----  Regarding: home health  Can you please check into options for home health for this gentleman-he would benefit from as needed IV fluid (1 L NS bolus) and IV Zofran in the hopes of keeping him out of the emergency room.

## 2021-09-16 NOTE — TELEPHONE ENCOUNTER
Call from Ellie @ [phone] with [facility].  States they are pharm do not provide home health, but pt could go to their infusion center once/wk.  Checking if this distance would be difficult for pt.     Call to pt.  States he does not have a car, but would be able to arrange ride.  States someone called him about this, and was to send him something to sign via email.  Has not yet received.     Call to Ellie.  Advise pt willing to proceed.  She will contact pt tomorrow to arrange.      Call to pt.  Advise of above.  Ellie's phone # given.      States thinks maybe other call was from [facility].  (See care everywhere note of today).  States prefers to proceed with above.    Update to DR Aguayo.

## 2021-09-17 ENCOUNTER — TELEPHONE (OUTPATIENT)
Dept: GASTROENTEROLOGY | Facility: CLINIC | Age: 31
End: 2021-09-17

## 2021-09-17 NOTE — TELEPHONE ENCOUNTER
This is ok but pls let pt know that this is a temporary plan - pls have him continue to f/u with [facility] re their recommendations for longer term options that may give him longer lasting relief

## 2021-09-17 NOTE — TELEPHONE ENCOUNTER
----- Message from Audi Garcia sent at 9/17/2021  1:10 PM EDT -----  Regarding: iv fluids  Contact: [phone]  Alejandrina from [facility] needs to speak to the nurse regarding the prescription sent over for the iv fluids.

## 2021-09-17 NOTE — TELEPHONE ENCOUNTER
Called pt and advised of Dr Patel' note.  Pt wanted to let Dr Aguayo know how much he appreciates her and what she has done.  Pt also wanted to let Dr Aguayo know that he is not planning on leaving .  Update sent to Dr Aguayo.

## 2021-09-17 NOTE — TELEPHONE ENCOUNTER
Called Alejandrina at [facility] care and she is asking how long do we want the pt to get the IVF's and zofran. Advised will send message to Dr Aguayo.

## 2021-09-20 ENCOUNTER — TELEPHONE (OUTPATIENT)
Dept: GASTROENTEROLOGY | Facility: CLINIC | Age: 31
End: 2021-09-20

## 2021-09-20 NOTE — TELEPHONE ENCOUNTER
Geovany  Pharmacist  from [facility] called and advised of Dr Aguayo's note. Verb understanding.

## 2021-09-20 NOTE — TELEPHONE ENCOUNTER
rx sent - [facility] was last pharmacy used and this is where I sent - pls confirm this is ok with pt

## 2021-09-20 NOTE — TELEPHONE ENCOUNTER
----- Message from Morro Blancas sent at 9/17/2021  8:22 PM EDT -----  Regarding: Prescription Question  Contact: [phone]  I have thrush again is there anyway you can send a prescription for me to the pharmacy?

## 2021-09-22 ENCOUNTER — TELEPHONE (OUTPATIENT)
Dept: GASTROENTEROLOGY | Facility: CLINIC | Age: 31
End: 2021-09-22

## 2021-09-22 NOTE — TELEPHONE ENCOUNTER
Called pt and pt reports that he needs the nystatin to go to his [facility].  Advised pt we will resend to there.   Verb understanding.

## 2021-09-22 NOTE — TELEPHONE ENCOUNTER
Call from Asha with [employer] @ [phone] ext [phone].  States has approved services requested from [facility] for weekly IVIG x12.      Pt will be premedicated with po benadryl and tylenol.  Will receive 1 L NS rapid infusion over 30-60 min prior and IV phenergan prn.      States because of this, cannot approve [facility] services because duplication of services.     (see notes of 9/16).  Advise [facility] that this is meeting needs that DR Aguayo was addressing.  No further action on our part.  Verb understanding.     Update to Dr Aguayo.

## 2021-09-27 ENCOUNTER — TELEPHONE (OUTPATIENT)
Dept: GASTROENTEROLOGY | Facility: CLINIC | Age: 31
End: 2021-09-27

## 2021-09-27 NOTE — TELEPHONE ENCOUNTER
Call to [facility] - unable to speak with Geovany because he had left for the day.  Also unable to leave message.      Message noted (see notes of 9/22).     Call to pt.  Advise that understood he would be having weekly IVIG tx's along with IVF and IV Phenergan thru [facility].  States he had been confused because 2 different plans in place so wasn't sure what to do.   Advise to proceed with [facility] plan - contact them if any questions.  States will do so.     Update to DR Aguayo.

## 2021-09-27 NOTE — TELEPHONE ENCOUNTER
----- Message from Audi Gama Rep sent at 9/27/2021  1:17 PM EDT -----  Regarding: IV           [facility]ACE[facility] Villareal is calling in regards to above pt, he is refusing to go to place to get IV line set in place, they have discharged him. [phone] Geovany

## 2021-10-14 ENCOUNTER — TELEPHONE (OUTPATIENT)
Dept: GASTROENTEROLOGY | Facility: CLINIC | Age: 31
End: 2021-10-14

## 2021-10-14 DIAGNOSIS — E10.69 TYPE 1 DIABETES MELLITUS WITH OTHER SPECIFIED COMPLICATION (HCC): Primary | ICD-10-CM

## 2021-10-14 DIAGNOSIS — K31.84 GASTROPARESIS: ICD-10-CM

## 2021-10-14 NOTE — TELEPHONE ENCOUNTER
----- Message from Morro Blancas sent at 10/14/2021  2:47 PM EDT -----  Regarding: Referral Request  Contact: [phone]  I tried to get an appointment with [facility] that’s right there by your office but they said they needed a referral is that something that you can do?

## 2021-10-14 NOTE — TELEPHONE ENCOUNTER
I can try - I entered endocrine referral - have him reach out again to them - may need to come from pcp though for insurance purposes

## 2021-10-19 RX ORDER — METOCLOPRAMIDE 5 MG/1
TABLET ORAL
Qty: 120 TABLET | Refills: 1 | Status: SHIPPED | OUTPATIENT
Start: 2021-10-19 | End: 2022-01-31

## 2022-01-04 RX ORDER — PANTOPRAZOLE SODIUM 40 MG/1
TABLET, DELAYED RELEASE ORAL
Qty: 60 TABLET | Refills: 3 | Status: SHIPPED | OUTPATIENT
Start: 2022-01-04 | End: 2022-05-02

## 2022-01-31 RX ORDER — METOCLOPRAMIDE 5 MG/1
TABLET ORAL
Qty: 120 TABLET | Refills: 1 | Status: SHIPPED | OUTPATIENT
Start: 2022-01-31 | End: 2022-03-30

## 2022-03-30 RX ORDER — METOCLOPRAMIDE 5 MG/1
TABLET ORAL
Qty: 120 TABLET | Refills: 1 | Status: SHIPPED | OUTPATIENT
Start: 2022-03-30 | End: 2022-06-01

## 2022-05-02 RX ORDER — PANTOPRAZOLE SODIUM 40 MG/1
TABLET, DELAYED RELEASE ORAL
Qty: 60 TABLET | Refills: 3 | Status: SHIPPED | OUTPATIENT
Start: 2022-05-02 | End: 2022-11-28

## 2022-05-04 ENCOUNTER — TELEPHONE (OUTPATIENT)
Dept: GASTROENTEROLOGY | Facility: CLINIC | Age: 32
End: 2022-05-04

## 2022-05-04 NOTE — TELEPHONE ENCOUNTER
PA was processed & approved thru [employer]   5/4/2022-5/3/2023  To be scanned into Media tab upon receipt- Sp/w  patient -notified of approval

## 2022-06-01 RX ORDER — METOCLOPRAMIDE 5 MG/1
TABLET ORAL
Qty: 120 TABLET | Refills: 1 | Status: SHIPPED | OUTPATIENT
Start: 2022-06-01 | End: 2022-09-02

## 2022-06-22 DIAGNOSIS — F32.A ANXIETY AND DEPRESSION: ICD-10-CM

## 2022-06-22 DIAGNOSIS — F41.9 ANXIETY AND DEPRESSION: ICD-10-CM

## 2022-06-22 RX ORDER — DULOXETIN HYDROCHLORIDE 30 MG/1
CAPSULE, DELAYED RELEASE ORAL
Qty: 90 CAPSULE | Refills: 2 | OUTPATIENT
Start: 2022-06-22

## 2022-07-13 DIAGNOSIS — F41.9 ANXIETY AND DEPRESSION: ICD-10-CM

## 2022-07-13 DIAGNOSIS — F32.A ANXIETY AND DEPRESSION: ICD-10-CM

## 2022-07-13 RX ORDER — DULOXETIN HYDROCHLORIDE 30 MG/1
CAPSULE, DELAYED RELEASE ORAL
Qty: 90 CAPSULE | Refills: 2 | OUTPATIENT
Start: 2022-07-13

## 2022-08-29 ENCOUNTER — OFFICE VISIT (OUTPATIENT)
Dept: FAMILY MEDICINE CLINIC | Facility: CLINIC | Age: 32
End: 2022-08-29

## 2022-08-29 VITALS
DIASTOLIC BLOOD PRESSURE: 74 MMHG | HEART RATE: 97 BPM | WEIGHT: 119 LBS | SYSTOLIC BLOOD PRESSURE: 126 MMHG | BODY MASS INDEX: 17.63 KG/M2 | OXYGEN SATURATION: 97 % | HEIGHT: 69 IN

## 2022-08-29 DIAGNOSIS — F41.9 ANXIETY AND DEPRESSION: ICD-10-CM

## 2022-08-29 DIAGNOSIS — F32.A ANXIETY AND DEPRESSION: ICD-10-CM

## 2022-08-29 DIAGNOSIS — E10.65 TYPE 1 DIABETES MELLITUS WITH HYPERGLYCEMIA: Primary | ICD-10-CM

## 2022-08-29 PROCEDURE — 99213 OFFICE O/P EST LOW 20 MIN: CPT | Performed by: NURSE PRACTITIONER

## 2022-08-29 RX ORDER — ONDANSETRON 2 MG/ML
INJECTION INTRAMUSCULAR; INTRAVENOUS
COMMUNITY
Start: 2022-08-17

## 2022-08-29 RX ORDER — DULOXETIN HYDROCHLORIDE 30 MG/1
30 CAPSULE, DELAYED RELEASE ORAL DAILY
Qty: 90 CAPSULE | Refills: 3 | Status: SHIPPED | OUTPATIENT
Start: 2022-08-29

## 2022-08-29 RX ORDER — PROMETHAZINE HYDROCHLORIDE 25 MG/ML
INJECTION, SOLUTION INTRAMUSCULAR; INTRAVENOUS
COMMUNITY
Start: 2022-08-17

## 2022-08-29 RX ORDER — PROCHLORPERAZINE 25 MG/1
SUPPOSITORY RECTAL
COMMUNITY
Start: 2022-08-12

## 2022-08-29 RX ORDER — DIPHENHYDRAMINE HYDROCHLORIDE 50 MG/ML
INJECTION INTRAMUSCULAR; INTRAVENOUS
COMMUNITY
Start: 2022-08-17

## 2022-08-29 RX ORDER — DICYCLOMINE HYDROCHLORIDE 10 MG/1
CAPSULE ORAL
COMMUNITY
Start: 2022-08-05

## 2022-08-29 RX ORDER — SODIUM CHLORIDE 9 MG/ML
INJECTION, SOLUTION INTRAVENOUS
COMMUNITY
Start: 2022-08-17

## 2022-08-29 RX ORDER — SCOLOPAMINE TRANSDERMAL SYSTEM 1 MG/1
PATCH, EXTENDED RELEASE TRANSDERMAL
COMMUNITY
Start: 2022-08-08

## 2022-08-29 RX ORDER — INSULIN LISPRO 100 [IU]/ML
INJECTION, SOLUTION INTRAVENOUS; SUBCUTANEOUS
COMMUNITY
Start: 2022-07-28

## 2022-08-29 NOTE — PROGRESS NOTES
Chief Complaint  Depression (Med Refill. )    Subjective        Morro Blancas presents to [facility]  Depression  Visit Type: follow-up  Patient presents with the following symptoms: decreased concentration (improving with medication), depressed mood (improving on medication), excessive worry, feelings of hopelessness, feelings of worthlessness, insomnia, irritability, nervousness/anxiety and restlessness.  Patient is not experiencing: chest pain and shortness of breath.  Frequency of symptoms: most days   Severity: severe   Sleep quality: poor          I have reviewed the patient's medical history in detail and updated the computerized patient record.    Current Outpatient Medications:   •  Acetone, Urine, Test (Ketone Test) strip, 1 strip by In Vitro route As Needed (nausea, vomiting with elevated glucose levels)., Disp: 50 strip, Rfl: 3  •  Continuous Blood Gluc Sensor (Dexcom G6 Sensor), , Disp: , Rfl:   •  Continuous Blood Gluc Transmit (Dexcom G6 Transmitter) misc, USE ONE TRANSMITTER EVERY 90 DAYS, Disp: , Rfl:   •  dicyclomine (BENTYL) 10 MG capsule, TAKE 1 CAPSULE 4 TIMES A DAY AS NEEDED FOR CRAMPING, Disp: , Rfl:   •  diphenhydrAMINE (BENADRYL) 50 MG/ML injection, , Disp: , Rfl:   •  DULoxetine (CYMBALTA) 30 MG capsule, Take 1 capsule by mouth Daily., Disp: 90 capsule, Rfl: 3  •  Glucagon, rDNA, (Glucagon Emergency) 1 MG kit, As Needed., Disp: , Rfl:   •  HumaLOG 100 UNIT/ML injection,  UNITS VIA INSULIN PUMP DAILY, Disp: , Rfl:   •  Insulin Disposable Pump (OmniPod Dash 5 Pack Pods) misc, 0.85 Units. Basal rate recently decreased 1.5 u/hr to 0.85 u/hr  1 unit per 12 carb Correction factor 1unit=45mg/dL Goal 150 , Disp: , Rfl:   •  metoclopramide (REGLAN) 5 MG tablet, TAKE 1 TABLET BY MOUTH FOUR TIMES A DAY WITH MEALS AND AT BEDTIME, Disp: 120 tablet, Rfl: 1  •  multivitamin with minerals tablet tablet, Take 1 tablet by mouth Daily. Chewable gummie, Disp: , Rfl:   •   "ondansetron (ZOFRAN) 2 mg/mL injection, , Disp: , Rfl:   •  ondansetron ODT (ZOFRAN-ODT) 4 MG disintegrating tablet, Take 4 mg by mouth Every 8 (Eight) Hours As Needed., Disp: , Rfl:   •  pantoprazole (PROTONIX) 40 MG EC tablet, TAKE 1 TABLET BY MOUTH TWICE A DAY, Disp: 60 tablet, Rfl: 3  •  promethazine (PHENERGAN) 25 MG suppository, Insert 1 suppository into the rectum Every 6 (Six) Hours As Needed for Nausea or Vomiting., Disp: 20 suppository, Rfl: 0  •  promethazine (PHENERGAN) 25 MG/ML injection, , Disp: , Rfl:   •  rOPINIRole (REQUIP) 0.5 MG tablet, Take 0.5 mg by mouth Daily As Needed., Disp: , Rfl:   •  Scopolamine 1 MG/3DAYS patch, PLACE 1 PATCH ON THE SKIN EVERY 3RD DAY., Disp: , Rfl:   •  sodium chloride 0.9 % solution, , Disp: , Rfl:   Objective   Vital Signs:  /74 (BP Location: Left arm, Patient Position: Sitting, Cuff Size: Small Adult)   Pulse 97   Ht 175.3 cm (69\")   Wt 54 kg (119 lb)   SpO2 97%   BMI 17.57 kg/m²   Estimated body mass index is 17.57 kg/m² as calculated from the following:    Height as of this encounter: 175.3 cm (69\").    Weight as of this encounter: 54 kg (119 lb).          Physical Exam  Constitutional:       Appearance: Normal appearance. He is underweight.   Cardiovascular:      Rate and Rhythm: Normal rate and regular rhythm.      Pulses: Normal pulses.      Heart sounds: Normal heart sounds.   Pulmonary:      Effort: Pulmonary effort is normal.      Breath sounds: Normal breath sounds.   Skin:     General: Skin is warm and dry.   Neurological:      Mental Status: He is alert and oriented to person, place, and time.   Psychiatric:      Comments: No acute distress        Result Review :                Assessment and Plan   Diagnoses and all orders for this visit:    1. Type 1 diabetes mellitus with hyperglycemia (HCC) (Primary)  -     Ambulatory Referral to Endocrinology    2. Anxiety and depression  -     DULoxetine (CYMBALTA) 30 MG capsule; Take 1 capsule by mouth " Daily.  Dispense: 90 capsule; Refill: 3    Mr. Blancas does not appear to be in any acute distress.  His anxiety is controlled with the Duloxetine. He is to continue Duloxetine 30 mg daily as prescribed.  He would like a referral to a new endocrinologist within the [facility] network. He is currently seeing someone outside of [facility] and is having difficulty getting his medications and insulin pump supplies reordered by that provider. I have put in a referral at his request.          Follow Up   Return in about 6 months (around 2/28/2023), or if symptoms worsen or fail to improve, for Annual physical, Fasting labs 1 week prior to next f/u.  Patient was given instructions and counseling regarding his condition or for health maintenance advice. Please see specific information pulled into the AVS if appropriate.

## 2022-09-02 RX ORDER — METOCLOPRAMIDE 5 MG/1
TABLET ORAL
Qty: 120 TABLET | Refills: 0 | Status: SHIPPED | OUTPATIENT
Start: 2022-09-02 | End: 2022-10-10

## 2022-09-16 PROBLEM — N52.9 ERECTILE DYSFUNCTION: Status: RESOLVED | Noted: 2017-06-15 | Resolved: 2022-09-16

## 2022-09-16 PROBLEM — K52.89 OTHER AND UNSPECIFIED NONINFECTIOUS GASTROENTERITIS AND COLITIS(558.9): Status: ACTIVE | Noted: 2021-09-12

## 2022-09-16 PROBLEM — E10.49 TYPE 1 DIABETES MELLITUS WITH NEUROLOGICAL MANIFESTATIONS (HCC): Status: ACTIVE | Noted: 2022-09-16

## 2022-09-16 PROBLEM — A41.9 SEPSIS WITHOUT ACUTE ORGAN DYSFUNCTION (HCC): Status: RESOLVED | Noted: 2021-03-21 | Resolved: 2022-09-16

## 2022-09-16 PROBLEM — K59.89 DIFFUSE DYSFUNCTION OF SMOOTH MUSCLE OF GASTROINTESTINAL TRACT: Status: RESOLVED | Noted: 2021-09-12 | Resolved: 2022-09-16

## 2022-09-16 PROBLEM — K59.89 DIFFUSE DYSFUNCTION OF SMOOTH MUSCLE OF GASTROINTESTINAL TRACT: Status: ACTIVE | Noted: 2021-09-12

## 2022-09-16 PROBLEM — Z80.0 FAMILY HISTORY OF COLORECTAL CANCER: Status: RESOLVED | Noted: 2020-11-04 | Resolved: 2022-09-16

## 2022-09-16 PROBLEM — K52.89 OTHER AND UNSPECIFIED NONINFECTIOUS GASTROENTERITIS AND COLITIS(558.9): Status: RESOLVED | Noted: 2021-09-12 | Resolved: 2022-09-16

## 2022-09-16 PROBLEM — K92.89 GASTROINTESTINAL DYSMOTILITY: Status: ACTIVE | Noted: 2022-09-16

## 2022-09-16 PROBLEM — D72.829 LEUKOCYTOSIS: Status: RESOLVED | Noted: 2021-05-24 | Resolved: 2022-09-16

## 2022-09-16 PROBLEM — Z72.0 TOBACCO ABUSE: Status: RESOLVED | Noted: 2021-03-21 | Resolved: 2022-09-16

## 2022-10-10 RX ORDER — METOCLOPRAMIDE 5 MG/1
TABLET ORAL
Qty: 120 TABLET | Refills: 0 | Status: SHIPPED | OUTPATIENT
Start: 2022-10-10 | End: 2022-10-12 | Stop reason: SDUPTHER

## 2022-10-12 ENCOUNTER — OFFICE VISIT (OUTPATIENT)
Dept: GASTROENTEROLOGY | Facility: CLINIC | Age: 32
End: 2022-10-12

## 2022-10-12 VITALS
WEIGHT: 135.2 LBS | HEART RATE: 112 BPM | HEIGHT: 70 IN | BODY MASS INDEX: 19.36 KG/M2 | SYSTOLIC BLOOD PRESSURE: 139 MMHG | TEMPERATURE: 97.5 F | DIASTOLIC BLOOD PRESSURE: 92 MMHG

## 2022-10-12 DIAGNOSIS — K31.84 GASTROPARESIS: Primary | ICD-10-CM

## 2022-10-12 DIAGNOSIS — K21.01 GASTROESOPHAGEAL REFLUX DISEASE WITH ESOPHAGITIS AND HEMORRHAGE: ICD-10-CM

## 2022-10-12 DIAGNOSIS — E10.69 TYPE 1 DIABETES MELLITUS WITH OTHER SPECIFIED COMPLICATION: ICD-10-CM

## 2022-10-12 PROCEDURE — 99213 OFFICE O/P EST LOW 20 MIN: CPT | Performed by: PHYSICIAN ASSISTANT

## 2022-10-12 RX ORDER — METOCLOPRAMIDE 5 MG/1
5 TABLET ORAL
Qty: 360 TABLET | Refills: 3 | Status: SHIPPED | OUTPATIENT
Start: 2022-10-12

## 2022-10-12 RX ORDER — IMMUNE GLOBULIN (HUMAN) 10 G/100ML
INJECTION INTRAVENOUS; SUBCUTANEOUS
COMMUNITY
Start: 2022-09-19

## 2022-10-12 NOTE — PROGRESS NOTES
"Chief Complaint  gastroparesis    Subjective          History of Present Illness    Morro Blancas is a  32 y.o. male presents for follow-up on gastroparesis.  He is a patient of Dr. Aguayo last seen in the office on 7/8/2021.  He is new to me.    He continues to see Dr. Lopes at [facility]. He receives gamunex-C (IgG) infusions which has really helped the gastroparesis. He takes Metoclopramide 5mg prior to meals and in the evening. Denies tardive dyskinesia, mood changes, memory loss. He also takes pantoprazole 40mg QD for GERD.     He reports he is doing very well since starting the infusions.  His weight is stable.  He denies intractable nausea or vomiting, hematochezia, diarrhea, constipation.  He is eating more frequently than last year.  He reports that his diabetes is better controlled.    4/2021 EGD showed esophagitis    Objective   Vital Signs:   /92   Pulse 112   Temp 97.5 °F (36.4 °C)   Ht 177.8 cm (70\")   Wt 61.3 kg (135 lb 3.2 oz)   BMI 19.40 kg/m²       Physical Exam  Vitals reviewed.   Constitutional:       General: He is awake. He is not in acute distress.     Appearance: Normal appearance. He is well-developed and well-groomed.   HENT:      Head: Normocephalic.   Pulmonary:      Effort: Pulmonary effort is normal. No respiratory distress.   Skin:     Coloration: Skin is not pale.   Neurological:      Mental Status: He is alert and oriented to person, place, and time.      Gait: Gait is intact.   Psychiatric:         Mood and Affect: Mood and affect normal.         Speech: Speech normal.         Behavior: Behavior is cooperative.         Judgment: Judgment normal.          Result Review :             Assessment and Plan    Diagnoses and all orders for this visit:    1. Gastroparesis (Primary)    2. Type 1 diabetes mellitus with other specified complication (HCC)    3. Gastroesophageal reflux disease with esophagitis and hemorrhage    Other orders  -     metoclopramide (REGLAN) 5 " MG tablet; Take 1 tablet by mouth 4 (Four) Times a Day Before Meals & at Bedtime.  Dispense: 360 tablet; Refill: 3    Continue PPI and Reglan.  Reviewed tardive dyskinesia side effects with patient.  He will stop medication if these occur and let me know.  Continue follow-up with Dr. Lopes and IgG infusions.      Follow Up   Return in about 1 year (around 10/12/2023).    Dragon dictation used throughout this note.     Anupama Rodarte PA-C

## 2022-11-28 RX ORDER — PANTOPRAZOLE SODIUM 40 MG/1
TABLET, DELAYED RELEASE ORAL
Qty: 60 TABLET | Refills: 3 | Status: SHIPPED | OUTPATIENT
Start: 2022-11-28

## 2022-11-28 NOTE — TELEPHONE ENCOUNTER
Escribe request for pantoprazole 40 mg 2x/day to LELE Rodarte.     It is noted that pt also following with [facility].

## 2023-03-09 DIAGNOSIS — Z00.00 ANNUAL PHYSICAL EXAM: Primary | ICD-10-CM

## 2023-03-14 LAB
ALBUMIN SERPL-MCNC: 4.8 G/DL (ref 4–5)
ALBUMIN/GLOB SERPL: 1.5 {RATIO} (ref 1.2–2.2)
ALP SERPL-CCNC: 105 IU/L (ref 44–121)
ALT SERPL-CCNC: 19 IU/L (ref 0–44)
AST SERPL-CCNC: 16 IU/L (ref 0–40)
BILIRUB SERPL-MCNC: 0.4 MG/DL (ref 0–1.2)
BUN SERPL-MCNC: 23 MG/DL (ref 6–20)
BUN/CREAT SERPL: 20 (ref 9–20)
CALCIUM SERPL-MCNC: 10.3 MG/DL (ref 8.7–10.2)
CHLORIDE SERPL-SCNC: 99 MMOL/L (ref 96–106)
CHOLEST SERPL-MCNC: 247 MG/DL (ref 100–199)
CHOLEST/HDLC SERPL: 4 RATIO (ref 0–5)
CO2 SERPL-SCNC: 27 MMOL/L (ref 20–29)
CREAT SERPL-MCNC: 1.16 MG/DL (ref 0.76–1.27)
EGFRCR SERPLBLD CKD-EPI 2021: 86 ML/MIN/1.73
ERYTHROCYTE [DISTWIDTH] IN BLOOD BY AUTOMATED COUNT: 13.1 % (ref 11.6–15.4)
GLOBULIN SER CALC-MCNC: 3.2 G/DL (ref 1.5–4.5)
GLUCOSE SERPL-MCNC: 194 MG/DL (ref 70–99)
HCT VFR BLD AUTO: 44.4 % (ref 37.5–51)
HDLC SERPL-MCNC: 62 MG/DL
HGB BLD-MCNC: 14.8 G/DL (ref 13–17.7)
LDLC SERPL CALC-MCNC: 147 MG/DL (ref 0–99)
MCH RBC QN AUTO: 29.7 PG (ref 26.6–33)
MCHC RBC AUTO-ENTMCNC: 33.3 G/DL (ref 31.5–35.7)
MCV RBC AUTO: 89 FL (ref 79–97)
PLATELET # BLD AUTO: 321 X10E3/UL (ref 150–450)
POTASSIUM SERPL-SCNC: 5.2 MMOL/L (ref 3.5–5.2)
PROT SERPL-MCNC: 8 G/DL (ref 6–8.5)
RBC # BLD AUTO: 4.99 X10E6/UL (ref 4.14–5.8)
SODIUM SERPL-SCNC: 140 MMOL/L (ref 134–144)
TRIGL SERPL-MCNC: 211 MG/DL (ref 0–149)
VLDLC SERPL CALC-MCNC: 38 MG/DL (ref 5–40)
WBC # BLD AUTO: 9.2 X10E3/UL (ref 3.4–10.8)

## 2023-03-20 ENCOUNTER — OFFICE VISIT (OUTPATIENT)
Dept: FAMILY MEDICINE CLINIC | Facility: CLINIC | Age: 33
End: 2023-03-20
Payer: MEDICAID

## 2023-03-20 VITALS
HEIGHT: 70 IN | OXYGEN SATURATION: 97 % | DIASTOLIC BLOOD PRESSURE: 82 MMHG | WEIGHT: 153.3 LBS | SYSTOLIC BLOOD PRESSURE: 118 MMHG | HEART RATE: 104 BPM | BODY MASS INDEX: 21.95 KG/M2

## 2023-03-20 DIAGNOSIS — Z00.00 ANNUAL PHYSICAL EXAM: Primary | ICD-10-CM

## 2023-03-20 DIAGNOSIS — R73.01 IMPAIRED FASTING GLUCOSE: ICD-10-CM

## 2023-03-20 LAB
EXPIRATION DATE: NORMAL
HBA1C MFR BLD: 9.6 %
Lab: NORMAL

## 2023-03-20 PROCEDURE — 2014F MENTAL STATUS ASSESS: CPT | Performed by: NURSE PRACTITIONER

## 2023-03-20 PROCEDURE — 3046F HEMOGLOBIN A1C LEVEL >9.0%: CPT | Performed by: NURSE PRACTITIONER

## 2023-03-20 PROCEDURE — 99395 PREV VISIT EST AGE 18-39: CPT | Performed by: NURSE PRACTITIONER

## 2023-03-20 PROCEDURE — 1160F RVW MEDS BY RX/DR IN RCRD: CPT | Performed by: NURSE PRACTITIONER

## 2023-03-20 PROCEDURE — 83036 HEMOGLOBIN GLYCOSYLATED A1C: CPT | Performed by: NURSE PRACTITIONER

## 2023-03-20 PROCEDURE — 1159F MED LIST DOCD IN RCRD: CPT | Performed by: NURSE PRACTITIONER

## 2023-03-20 PROCEDURE — 3008F BODY MASS INDEX DOCD: CPT | Performed by: NURSE PRACTITIONER

## 2023-03-20 NOTE — PROGRESS NOTES
Chief Complaint  Annual Exam (PHYSICAL)    Subjective        Morro Blancas presents to [facility]  History of Present Illness  Mr. Blancas presents today for an annual physical exam with lab review.      I have reviewed the patient's medical history in detail and updated the computerized patient record.    Current Outpatient Medications:   •  Continuous Blood Gluc Sensor (Dexcom G6 Sensor), , Disp: , Rfl:   •  Continuous Blood Gluc Transmit (Dexcom G6 Transmitter) misc, USE ONE TRANSMITTER EVERY 90 DAYS, Disp: , Rfl:   •  dicyclomine (BENTYL) 10 MG capsule, TAKE 1 CAPSULE 4 TIMES A DAY AS NEEDED FOR CRAMPING, Disp: , Rfl:   •  diphenhydrAMINE (BENADRYL) 50 MG/ML injection, , Disp: , Rfl:   •  DULoxetine (CYMBALTA) 30 MG capsule, Take 1 capsule by mouth Daily., Disp: 90 capsule, Rfl: 3  •  Gamunex-C 5 GM/50ML solution infusion, , Disp: , Rfl:   •  Glucagon, rDNA, (Glucagon Emergency) 1 MG kit, As Needed., Disp: , Rfl:   •  HumaLOG 100 UNIT/ML injection,  UNITS VIA INSULIN PUMP DAILY, Disp: , Rfl:   •  Insulin Disposable Pump (OmniPod Dash 5 Pack Pods) misc, 0.85 Units. Basal rate recently decreased 1.5 u/hr to 0.85 u/hr  1 unit per 12 carb Correction factor 1unit=45mg/dL Goal 150 , Disp: , Rfl:   •  metoclopramide (REGLAN) 5 MG tablet, Take 1 tablet by mouth 4 (Four) Times a Day Before Meals & at Bedtime., Disp: 360 tablet, Rfl: 3  •  multivitamin with minerals tablet tablet, Take 1 tablet by mouth Daily. Chewable gummie, Disp: , Rfl:   •  ondansetron (ZOFRAN) 2 mg/mL injection, , Disp: , Rfl:   •  ondansetron ODT (ZOFRAN-ODT) 4 MG disintegrating tablet, Take 1 tablet by mouth Every 8 (Eight) Hours As Needed., Disp: , Rfl:   •  pantoprazole (PROTONIX) 40 MG EC tablet, TAKE 1 TABLET BY MOUTH TWICE A DAY, Disp: 60 tablet, Rfl: 3  •  promethazine (PHENERGAN) 25 MG suppository, Insert 1 suppository into the rectum Every 6 (Six) Hours As Needed for Nausea or Vomiting., Disp: 20  "suppository, Rfl: 0  •  promethazine (PHENERGAN) 25 MG/ML injection, , Disp: , Rfl:   •  rOPINIRole (REQUIP) 0.5 MG tablet, Take 1 tablet by mouth Daily As Needed., Disp: , Rfl:   •  Scopolamine 1 MG/3DAYS patch, PLACE 1 PATCH ON THE SKIN EVERY 3RD DAY., Disp: , Rfl:   •  sodium chloride 0.9 % solution, , Disp: , Rfl:      Review of Systems   Constitutional: Negative.    HENT: Negative.    Eyes: Positive for visual disturbance.   Respiratory: Negative.    Cardiovascular: Negative.    Gastrointestinal: Negative.  Negative for diarrhea (resolved with medication).   Genitourinary: Negative.    Musculoskeletal: Negative.    Skin: Negative.    Neurological: Negative.    Psychiatric/Behavioral: Negative.      Objective   Vital Signs:  /82 (BP Location: Left arm, Patient Position: Sitting, Cuff Size: Adult)   Pulse 104   Ht 177.8 cm (70\")   Wt 69.5 kg (153 lb 4.8 oz)   SpO2 97%   BMI 22.00 kg/m²   Estimated body mass index is 22 kg/m² as calculated from the following:    Height as of this encounter: 177.8 cm (70\").    Weight as of this encounter: 69.5 kg (153 lb 4.8 oz).       BMI is within normal parameters. No other follow-up for BMI required.      Physical Exam  Vitals reviewed.   Constitutional:       Appearance: Normal appearance. He is normal weight.   Neck:      Vascular: No carotid bruit.   Cardiovascular:      Rate and Rhythm: Normal rate and regular rhythm.      Pulses:           Dorsalis pedis pulses are 1+ on the right side and 1+ on the left side.        Posterior tibial pulses are 1+ on the right side and 1+ on the left side.      Heart sounds: Normal heart sounds.   Pulmonary:      Effort: Pulmonary effort is normal.      Breath sounds: Normal breath sounds.   Abdominal:      General: Bowel sounds are normal.      Palpations: Abdomen is soft.   Musculoskeletal:      Cervical back: Normal range of motion.      Right foot: Normal range of motion.      Left foot: Normal range of motion.   Feet:      " Right foot:      Protective Sensation: 9 sites tested. 9 sites sensed.      Skin integrity: Callus and dry skin present.      Toenail Condition: Fungal disease present.     Left foot:      Protective Sensation: 9 sites tested. 9 sites sensed.      Skin integrity: Callus and dry skin present.      Toenail Condition: Fungal disease present.  Skin:     General: Skin is warm and dry.   Neurological:      Mental Status: He is alert and oriented to person, place, and time.   Psychiatric:      Comments: No acute distress        Result Review :    Common labs    Common Labs 3/13/23 3/13/23 3/13/23 3/20/23    0813 0813 0813    Glucose 194 (A)      BUN 23 (A)      Creatinine 1.16      Sodium 140      Potassium 5.2      Chloride 99      Calcium 10.3 (A)      Total Protein 8.0      Albumin 4.8      Total Bilirubin 0.4      Alkaline Phosphatase 105      AST (SGOT) 16      ALT (SGPT) 19      WBC  9.2     Hemoglobin  14.8     Hematocrit  44.4     Platelets  321     Total Cholesterol   247 (A)    Triglycerides   211 (A)    HDL Cholesterol   62    LDL Cholesterol    147 (A)    Hemoglobin A1C    9.6   (A) Abnormal value                         Assessment and Plan   Diagnoses and all orders for this visit:    1. Annual physical exam (Primary)    2. Impaired fasting glucose  -     POC Glycated Hemoglobin, Total    Mr. Blancas appears to be doing well today.  I have reviewed his labs with him. His lipids, glucose and A1C are elevated. He is followed by endocrinology for his diabetes management.   We have discussed age related healthy behaviors.          Follow Up   Return in about 1 year (around 3/20/2024), or if symptoms worsen or fail to improve, for Annual physical, Fasting labs 1 week prior to next f/u.  Patient was given instructions and counseling regarding his condition or for health maintenance advice. Please see specific information pulled into the AVS if appropriate.

## 2023-04-19 NOTE — TELEPHONE ENCOUNTER
Refills have been requested for the following medications:         pantoprazole (PROTONIX) 40 MG EC tablet [Anupama Rodarte]     Preferred pharmacy: [facility]  Delivery method: Pickup

## 2023-04-20 RX ORDER — PANTOPRAZOLE SODIUM 40 MG/1
40 TABLET, DELAYED RELEASE ORAL 2 TIMES DAILY
Qty: 60 TABLET | Refills: 3 | Status: SHIPPED | OUTPATIENT
Start: 2023-04-20

## 2023-07-09 PROBLEM — R11.2 INTRACTABLE NAUSEA AND VOMITING: Status: ACTIVE | Noted: 2023-07-09

## 2023-07-09 PROBLEM — E87.20 METABOLIC ACIDOSIS: Status: ACTIVE | Noted: 2023-07-09

## 2023-08-30 ENCOUNTER — TELEPHONE (OUTPATIENT)
Dept: GASTROENTEROLOGY | Facility: CLINIC | Age: 33
End: 2023-08-30

## 2023-11-30 DIAGNOSIS — F41.9 ANXIETY AND DEPRESSION: ICD-10-CM

## 2023-11-30 DIAGNOSIS — F32.A ANXIETY AND DEPRESSION: ICD-10-CM

## 2023-12-01 RX ORDER — DULOXETIN HYDROCHLORIDE 30 MG/1
30 CAPSULE, DELAYED RELEASE ORAL DAILY
Qty: 90 CAPSULE | Refills: 3 | Status: SHIPPED | OUTPATIENT
Start: 2023-12-01

## 2023-12-01 RX ORDER — METOCLOPRAMIDE 5 MG/1
5 TABLET ORAL
Qty: 360 TABLET | Refills: 3 | OUTPATIENT
Start: 2023-12-01

## 2024-03-12 DIAGNOSIS — E10.65 TYPE 1 DIABETES MELLITUS WITH HYPERGLYCEMIA: Primary | ICD-10-CM

## 2024-04-10 ENCOUNTER — TELEPHONE (OUTPATIENT)
Dept: FAMILY MEDICINE CLINIC | Facility: CLINIC | Age: 34
End: 2024-04-10
Payer: MEDICARE

## 2024-04-10 NOTE — TELEPHONE ENCOUNTER
Called to inform he will need an annual physical with labs the week before, per Gale CHESTER. She is making him an appointment.

## 2024-04-10 NOTE — TELEPHONE ENCOUNTER
Caller: Mahsa Lee    Relationship: Emergency Contact    Best call back number: [phone]     What is the medical concern/diagnosis: MAHSA STATES THE PATIENT WAS DROPPED BY IS PREVIOUS PROVIDRER    What specialty or service is being requested: ENDOCRINOLOGY    What is the provider, practice or medical service name: N\A    Any additional details: PATIENT WOULD ALSO LIKE A PRESCRIPTION FOR INSULIN NEEDLES    PLEASE CALL AND ADVISE

## 2024-04-13 LAB
ALBUMIN SERPL-MCNC: 4.5 G/DL (ref 4.1–5.1)
ALBUMIN/CREAT UR: 830 MG/G CREAT (ref 0–29)
ALBUMIN/GLOB SERPL: 1.3 {RATIO} (ref 1.2–2.2)
ALP SERPL-CCNC: 110 IU/L (ref 44–121)
ALT SERPL-CCNC: 28 IU/L (ref 0–44)
AST SERPL-CCNC: 18 IU/L (ref 0–40)
BILIRUB SERPL-MCNC: 0.3 MG/DL (ref 0–1.2)
BUN SERPL-MCNC: 15 MG/DL (ref 6–20)
BUN/CREAT SERPL: 18 (ref 9–20)
CALCIUM SERPL-MCNC: 9.8 MG/DL (ref 8.7–10.2)
CHLORIDE SERPL-SCNC: 96 MMOL/L (ref 96–106)
CHOLEST SERPL-MCNC: 183 MG/DL (ref 100–199)
CHOLEST/HDLC SERPL: 3.3 RATIO (ref 0–5)
CO2 SERPL-SCNC: 26 MMOL/L (ref 20–29)
CREAT SERPL-MCNC: 0.85 MG/DL (ref 0.76–1.27)
CREAT UR-MCNC: 64.9 MG/DL
EGFRCR SERPLBLD CKD-EPI 2021: 118 ML/MIN/1.73
GLOBULIN SER CALC-MCNC: 3.6 G/DL (ref 1.5–4.5)
GLUCOSE SERPL-MCNC: 335 MG/DL (ref 70–99)
HBA1C MFR BLD: 9.7 % (ref 4.8–5.6)
HDLC SERPL-MCNC: 56 MG/DL
LDLC SERPL CALC-MCNC: 102 MG/DL (ref 0–99)
MICROALBUMIN UR-MCNC: 538.9 UG/ML
POTASSIUM SERPL-SCNC: 4.5 MMOL/L (ref 3.5–5.2)
PROT SERPL-MCNC: 8.1 G/DL (ref 6–8.5)
SODIUM SERPL-SCNC: 135 MMOL/L (ref 134–144)
TRIGL SERPL-MCNC: 143 MG/DL (ref 0–149)
VLDLC SERPL CALC-MCNC: 25 MG/DL (ref 5–40)

## 2024-04-19 ENCOUNTER — OFFICE VISIT (OUTPATIENT)
Dept: FAMILY MEDICINE CLINIC | Facility: CLINIC | Age: 34
End: 2024-04-19
Payer: MEDICARE

## 2024-04-19 VITALS
WEIGHT: 128.8 LBS | DIASTOLIC BLOOD PRESSURE: 78 MMHG | SYSTOLIC BLOOD PRESSURE: 122 MMHG | BODY MASS INDEX: 17.44 KG/M2 | HEIGHT: 72 IN | OXYGEN SATURATION: 99 % | HEART RATE: 117 BPM

## 2024-04-19 DIAGNOSIS — F32.A ANXIETY AND DEPRESSION: ICD-10-CM

## 2024-04-19 DIAGNOSIS — Z00.00 MEDICARE ANNUAL WELLNESS VISIT, SUBSEQUENT: Primary | ICD-10-CM

## 2024-04-19 DIAGNOSIS — F41.9 ANXIETY AND DEPRESSION: ICD-10-CM

## 2024-04-19 PROCEDURE — G0439 PPPS, SUBSEQ VISIT: HCPCS | Performed by: NURSE PRACTITIONER

## 2024-04-19 PROCEDURE — 1160F RVW MEDS BY RX/DR IN RCRD: CPT | Performed by: NURSE PRACTITIONER

## 2024-04-19 PROCEDURE — 1170F FXNL STATUS ASSESSED: CPT | Performed by: NURSE PRACTITIONER

## 2024-04-19 PROCEDURE — 1159F MED LIST DOCD IN RCRD: CPT | Performed by: NURSE PRACTITIONER

## 2024-04-19 PROCEDURE — 3046F HEMOGLOBIN A1C LEVEL >9.0%: CPT | Performed by: NURSE PRACTITIONER

## 2024-04-19 RX ORDER — DULOXETIN HYDROCHLORIDE 30 MG/1
30 CAPSULE, DELAYED RELEASE ORAL DAILY
Qty: 90 CAPSULE | Refills: 3 | Status: SHIPPED | OUTPATIENT
Start: 2024-04-19

## 2024-04-19 RX ORDER — OMEPRAZOLE 40 MG/1
CAPSULE, DELAYED RELEASE ORAL
COMMUNITY
Start: 2024-03-07

## 2024-04-19 NOTE — PATIENT INSTRUCTIONS
Medicare Wellness  Personal Prevention Plan of Service     Date of Office Visit:    Encounter Provider:  YUDELKA Deluna  Place of Service:  [facility]  Patient Name: Morro Blancas  :  1990    As part of the Medicare Wellness portion of your visit today, we are providing you with this personalized preventive plan of services (PPPS). This plan is based upon recommendations of the [place] Preventive Services Task Force (USPSTF) and the Advisory Committee on Immunization Practices (ACIP).    This lists the preventive care services that should be considered, and provides dates of when you are due. Items listed as completed are up-to-date and do not require any further intervention.    Health Maintenance   Topic Date Due    Pneumococcal Vaccine 0-64 (1 of 2 - PCV) Never done    DIABETIC EYE EXAM  Never done    Hepatitis B (1 of 3 - 19+ 3-dose series) Never done    TDAP/TD VACCINES (1 - Tdap) Never done    HEPATITIS C SCREENING  Never done    ANNUAL WELLNESS VISIT  Never done    DIABETIC FOOT EXAM  Never done    BMI FOLLOWUP  2023    COVID-19 Vaccine ( - - season) Never done    INFLUENZA VACCINE  2024    HEMOGLOBIN A1C  10/12/2024    URINE MICROALBUMIN  Discontinued       No orders of the defined types were placed in this encounter.      No follow-ups on file.

## 2024-04-19 NOTE — PROGRESS NOTES
The ABCs of the Annual Wellness Visit  Subsequent Medicare Wellness Visit    Subjective      Morro Blancas is a 33 y.o. male who presents for a Subsequent Medicare Wellness Visit.    The following portions of the patient's history were reviewed and   updated as appropriate: allergies, current medications, past family history, past medical history, past social history, past surgical history, and problem list.    Compared to one year ago, the patient feels his physical   health is worse.    Compared to one year ago, the patient feels his mental   health is worse.    Recent Hospitalizations:  This patient has had a [facility] admission record on file within the last 365 days.    Current Medical Providers:  Patient Care Team:  Gale Banegas APRN as PCP - General (Family Medicine)  Ysabel Javier MD as Consulting Physician (Endocrinology)    Outpatient Medications Prior to Visit   Medication Sig Dispense Refill    Continuous Blood Gluc Sensor (Dexcom G6 Sensor)       Continuous Blood Gluc Transmit (Dexcom G6 Transmitter) misc USE ONE TRANSMITTER EVERY 90 DAYS      diphenhydrAMINE (BENADRYL) 50 MG/ML injection       DULoxetine (CYMBALTA) 30 MG capsule Take 1 capsule by mouth Daily. 90 capsule 3    Gamunex-C 5 GM/50ML solution infusion       Glucagon, rDNA, (Glucagon Emergency) 1 MG kit As Needed.      HumaLOG 100 UNIT/ML injection  UNITS VIA INSULIN PUMP DAILY      Insulin Disposable Pump (OmniPod Dash 5 Pack Pods) misc 0.85 Units. Basal rate recently decreased 1.5 u/hr to 0.85 u/hr   1 unit per 12 carb  Correction factor 1unit=45mg/dL  Goal 150       multivitamin with minerals tablet tablet Take 1 tablet by mouth Daily. Chewable gummie      omeprazole (priLOSEC) 40 MG capsule       ondansetron (ZOFRAN) 2 mg/mL injection       promethazine (PHENERGAN) 25 MG/ML injection       dicyclomine (BENTYL) 10 MG capsule TAKE 1 CAPSULE 4 TIMES A DAY AS NEEDED FOR CRAMPING (Patient not taking: Reported on  4/19/2024)      metoclopramide (REGLAN) 5 MG tablet Take 1 tablet by mouth 4 (Four) Times a Day Before Meals & at Bedtime. (Patient not taking: Reported on 4/19/2024) 360 tablet 3    ondansetron ODT (ZOFRAN-ODT) 4 MG disintegrating tablet Take 1 tablet by mouth Every 8 (Eight) Hours As Needed. (Patient not taking: Reported on 4/19/2024)      pantoprazole (PROTONIX) 40 MG EC tablet Take 1 tablet by mouth 2 (Two) Times a Day. (Patient not taking: Reported on 4/19/2024) 60 tablet 3    promethazine (PHENERGAN) 25 MG suppository Insert 1 suppository into the rectum Every 6 (Six) Hours As Needed for Nausea or Vomiting. (Patient not taking: Reported on 4/19/2024) 20 suppository 0    rOPINIRole (REQUIP) 0.5 MG tablet Take 1 tablet by mouth Daily As Needed. (Patient not taking: Reported on 4/19/2024)      Scopolamine 1 MG/3DAYS patch PLACE 1 PATCH ON THE SKIN EVERY 3RD DAY. (Patient not taking: Reported on 4/19/2024)      sodium chloride 0.9 % solution  (Patient not taking: Reported on 4/19/2024)       No facility-administered medications prior to visit.       No opioid medication identified on active medication list. I have reviewed chart for other potential  high risk medication/s and harmful drug interactions in the elderly.        Aspirin is not on active medication list.  Aspirin use is not indicated based on review of current medical condition/s. Risk of harm outweighs potential benefits.  .    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  ACP  "discussion was declined by the patient. Patient does not have an advance directive, declines further assistance.     Objective    Vitals:    24 1133   BP: 122/78   BP Location: Left arm   Patient Position: Sitting   Cuff Size: Adult   Pulse: 117   SpO2: 99%   Weight: 58.4 kg (128 lb 12.8 oz)   Height: 182.9 cm (72.01\")     Estimated body mass index is 17.46 kg/m² as calculated from the following:    Height as of this encounter: 182.9 cm (72.01\").    Weight as of this encounter: 58.4 kg (128 lb 12.8 oz).    BMI is below normal parameters (malnutrition). Recommendations: patient is under the care of GI for his ulcerative colitis which is contributing to his low BMI.       Does the patient have evidence of cognitive impairment?   No    Lab Results   Component Value Date    CHLPL 183 2024    TRIG 143 2024    HDL 56 2024     (H) 2024    VLDL 25 2024    HGBA1C 9.7 (H) 2024          HEALTH RISK ASSESSMENT    Smoking Status:  Social History     Tobacco Use   Smoking Status Every Day    Current packs/day: 1.00    Average packs/day: 1 pack/day for 19.3 years (19.3 ttl pk-yrs)    Types: Cigarettes    Start date: 2005   Smokeless Tobacco Never     Alcohol Consumption:  Social History     Substance and Sexual Activity   Alcohol Use Yes    Comment: 2 per week     Fall Risk Screen:    STEADI Fall Risk Assessment was completed, and patient is at LOW risk for falls.Assessment completed on:2024    Depression Screenin/19/2024    11:36 AM   PHQ-2/PHQ-9 Depression Screening   Little Interest or Pleasure in Doing Things 0-->not at all   Feeling Down, Depressed or Hopeless 0-->not at all   PHQ-9: Brief Depression Severity Measure Score 0       Health Habits and Functional and Cognitive Screenin/19/2024    11:37 AM   Functional & Cognitive Status   Do you have difficulty preparing food and eating? Yes   Do you have difficulty bathing yourself, getting dressed or " grooming yourself? No   Do you have difficulty using the toilet? No   Do you have difficulty moving around from place to place? No   Do you have trouble with steps or getting out of a bed or a chair? No   Current Diet Diabetic Diet   Dental Exam Not up to date   Eye Exam Not up to date   Exercise (times per week) 0 times per week   Do you need help using the phone?  No   Are you deaf or do you have serious difficulty hearing?  No   Do you need help to go to places out of walking distance? No   Do you need help shopping? No   Do you need help preparing meals?  Yes   Do you need help with housework?  Yes   Do you need help with laundry? Yes   Do you need help taking your medications? No   Do you need help managing money? No   Do you ever drive or ride in a car without wearing a seat belt? No   Have you felt unusual stress, anger or loneliness in the last month? Yes   Who do you live with? Other   If you need help, do you have trouble finding someone available to you? No   Have you been bothered in the last four weeks by sexual problems? No   Do you have difficulty concentrating, remembering or making decisions? Yes       Age-appropriate Screening Schedule:  Refer to the list below for future screening recommendations based on patient's age, sex and/or medical conditions. Orders for these recommended tests are listed in the plan section. The patient has been provided with a written plan.    Health Maintenance   Topic Date Due    Pneumococcal Vaccine 0-64 (1 of 2 - PCV) Never done    DIABETIC EYE EXAM  Never done    Hepatitis B (1 of 3 - 19+ 3-dose series) Never done    TDAP/TD VACCINES (1 - Tdap) Never done    HEPATITIS C SCREENING  Never done    ANNUAL WELLNESS VISIT  Never done    DIABETIC FOOT EXAM  Never done    BMI FOLLOWUP  08/29/2023    COVID-19 Vaccine (1 - 2023-24 season) Never done    INFLUENZA VACCINE  08/01/2024    HEMOGLOBIN A1C  10/12/2024    URINE MICROALBUMIN  Discontinued                  CMS  Preventative Services Quick Reference  Risk Factors Identified During Encounter:    None Identified    The above risks/problems have been discussed with the patient.  Pertinent information has been shared with the patient in the After Visit Summary.    Diagnoses and all orders for this visit:    1. Medicare annual wellness visit, subsequent (Primary)        Follow Up:   Next Medicare Wellness visit to be scheduled in 1 year.      An After Visit Summary and PPPS were made available to the patient.

## 2024-04-30 DIAGNOSIS — E10.65 TYPE 1 DIABETES MELLITUS WITH HYPERGLYCEMIA: Primary | ICD-10-CM

## 2025-04-24 ENCOUNTER — TELEPHONE (OUTPATIENT)
Dept: FAMILY MEDICINE CLINIC | Facility: CLINIC | Age: 35
End: 2025-04-24
Payer: MEDICARE

## 2025-04-24 NOTE — TELEPHONE ENCOUNTER
LVM moved patient's appt on 5-5-25 from 3:45 pm to 4:15 pm to allow for a 30 minute visit instead of 15 mins. If any issues, please jayden [phone].

## 2025-05-05 ENCOUNTER — OFFICE VISIT (OUTPATIENT)
Dept: FAMILY MEDICINE CLINIC | Facility: CLINIC | Age: 35
End: 2025-05-05
Payer: MEDICARE

## 2025-05-05 VITALS
HEIGHT: 70 IN | WEIGHT: 119.9 LBS | HEART RATE: 97 BPM | BODY MASS INDEX: 17.17 KG/M2 | TEMPERATURE: 98.2 F | DIASTOLIC BLOOD PRESSURE: 70 MMHG | OXYGEN SATURATION: 98 % | SYSTOLIC BLOOD PRESSURE: 96 MMHG

## 2025-05-05 DIAGNOSIS — F32.A ANXIETY AND DEPRESSION: Primary | ICD-10-CM

## 2025-05-05 DIAGNOSIS — F41.9 ANXIETY AND DEPRESSION: Primary | ICD-10-CM

## 2025-05-05 PROCEDURE — 1160F RVW MEDS BY RX/DR IN RCRD: CPT | Performed by: NURSE PRACTITIONER

## 2025-05-05 PROCEDURE — 99213 OFFICE O/P EST LOW 20 MIN: CPT | Performed by: NURSE PRACTITIONER

## 2025-05-05 PROCEDURE — 1159F MED LIST DOCD IN RCRD: CPT | Performed by: NURSE PRACTITIONER

## 2025-05-05 RX ORDER — INSULIN ASPART INJECTION 100 [IU]/ML
INJECTION, SOLUTION SUBCUTANEOUS
COMMUNITY
Start: 2025-03-31

## 2025-05-05 NOTE — PROGRESS NOTES
Chief Complaint  Referral (Needs a referral for behavioral health)    Subjective        Morro Blancas presents to [facility]  History of Present Illness  Mr. Blancas is here today to get a referral to Behavioral Health. His depression and anxiety are worsening since his mother passed away few months ago. He wants to discuss his anxiety/deprssion with a therapist.   Anxiety    Symptoms: compulsions, decreased concentration, depressed mood, excessive worry, insomnia, irritability, nervous/anxious, obsessions, pounding in chest, panic, restlessness and suicidal ideas (sometimes, when he has lack of sleep)      Symptoms: no chest pain and no confusion      Frequency:  Constantly    Severity:  Causing significant distress      I have reviewed the patient's medical history in detail and updated the computerized patient record.       Current Outpatient Medications:     Continuous Blood Gluc Sensor (Dexcom G6 Sensor), , Disp: , Rfl:     Continuous Blood Gluc Transmit (Dexcom G6 Transmitter) misc, USE ONE TRANSMITTER EVERY 90 DAYS, Disp: , Rfl:     diphenhydrAMINE (BENADRYL) 50 MG/ML injection, , Disp: , Rfl:     DULoxetine (CYMBALTA) 30 MG capsule, Take 1 capsule by mouth Daily., Disp: 90 capsule, Rfl: 3    Fiasp 100 UNIT/ML solution, Per directions for insulin pump, Disp: , Rfl:     Gamunex-C 5 GM/50ML solution infusion, , Disp: , Rfl:     Glucagon, rDNA, (Glucagon Emergency) 1 MG kit, As Needed., Disp: , Rfl:     Insulin Disposable Pump (OmniPod Dash 5 Pack Pods) misc, 0.85 Units. Basal rate recently decreased 1.5 u/hr to 0.85 u/hr  1 unit per 12 carb Correction factor 1unit=45mg/dL Goal 150 , Disp: , Rfl:     multivitamin with minerals tablet tablet, Take 1 tablet by mouth Daily. Chewable gummie, Disp: , Rfl:     omeprazole (priLOSEC) 40 MG capsule, , Disp: , Rfl:     ondansetron ODT (ZOFRAN-ODT) 4 MG disintegrating tablet, Take 1 tablet by mouth Every 8 (Eight) Hours As Needed., Disp: ,  "Rfl:     promethazine (PHENERGAN) 25 MG/ML injection, , Disp: , Rfl:     Scopolamine 1 MG/3DAYS patch, , Disp: , Rfl:        Objective   Vital Signs:  BP 96/70 (BP Location: Right arm, Patient Position: Sitting, Cuff Size: Adult)   Pulse 97   Temp 98.2 °F (36.8 °C) (Temporal)   Ht 177.8 cm (70\")   Wt 54.4 kg (119 lb 14.4 oz)   SpO2 98%   BMI 17.20 kg/m²   Estimated body mass index is 17.2 kg/m² as calculated from the following:    Height as of this encounter: 177.8 cm (70\").    Weight as of this encounter: 54.4 kg (119 lb 14.4 oz).          Physical Exam  Constitutional:       Appearance: Normal appearance. He is normal weight.   Cardiovascular:      Rate and Rhythm: Normal rate and regular rhythm.      Pulses: Normal pulses.      Heart sounds: Normal heart sounds.   Pulmonary:      Effort: Pulmonary effort is normal.      Breath sounds: Normal breath sounds.   Skin:     General: Skin is warm and dry.   Neurological:      Mental Status: He is alert and oriented to person, place, and time.   Psychiatric:      Comments: No acute distress        Result Review :                Assessment and Plan   Diagnoses and all orders for this visit:    1. Anxiety and depression (Primary)  -     Ambulatory Referral to Behavioral Health    Mr. Blancas does not appear to be in any acute distress.   He reports he has thought about \"not living\" but has not active plans or intentions.   I have put in a referral to [facility] for further evaluation.          Follow Up   Return if symptoms worsen or fail to improve.  Patient was given instructions and counseling regarding his condition or for health maintenance advice. Please see specific information pulled into the AVS if appropriate.             "

## 2025-05-08 DIAGNOSIS — F32.A ANXIETY AND DEPRESSION: ICD-10-CM

## 2025-05-08 DIAGNOSIS — F41.9 ANXIETY AND DEPRESSION: ICD-10-CM

## 2025-05-08 RX ORDER — DULOXETIN HYDROCHLORIDE 30 MG/1
30 CAPSULE, DELAYED RELEASE ORAL DAILY
Qty: 90 CAPSULE | Refills: 3 | Status: SHIPPED | OUTPATIENT
Start: 2025-05-08

## 2025-06-04 ENCOUNTER — TELEMEDICINE (OUTPATIENT)
Dept: PSYCHIATRY | Facility: CLINIC | Age: 35
End: 2025-06-04
Payer: MEDICARE

## 2025-06-04 DIAGNOSIS — F41.1 GENERALIZED ANXIETY DISORDER: Primary | Chronic | ICD-10-CM

## 2025-06-04 DIAGNOSIS — F33.1 MODERATE EPISODE OF RECURRENT MAJOR DEPRESSIVE DISORDER: Chronic | ICD-10-CM

## 2025-06-04 RX ORDER — DULOXETIN HYDROCHLORIDE 60 MG/1
60 CAPSULE, DELAYED RELEASE ORAL DAILY
Qty: 30 CAPSULE | Refills: 0 | Status: SHIPPED | OUTPATIENT
Start: 2025-06-04

## 2025-06-04 RX ORDER — ONDANSETRON 2 MG/ML
INJECTION INTRAMUSCULAR; INTRAVENOUS
COMMUNITY
Start: 2025-05-13

## 2025-06-04 RX ORDER — LISINOPRIL 5 MG/1
5 TABLET ORAL DAILY
COMMUNITY
Start: 2025-06-02

## 2025-06-04 RX ORDER — METOCLOPRAMIDE 5 MG/1
TABLET ORAL
COMMUNITY
Start: 2025-05-28

## 2025-06-04 RX ORDER — ATORVASTATIN CALCIUM 20 MG/1
20 TABLET, FILM COATED ORAL DAILY
COMMUNITY
Start: 2025-06-02

## 2025-06-04 RX ORDER — GLUCAGON INJECTION, SOLUTION 1 MG/.2ML
INJECTION, SOLUTION SUBCUTANEOUS
COMMUNITY
Start: 2025-05-08

## 2025-06-04 NOTE — PROGRESS NOTES
This provider is located at home office working remotely through the [facility] (through [facility]), [address], [facility], [address] using a secure [employer] Video Visit through [employer]. Patient is being seen remotely via telehealth at their home address in [place], and stated they are in a secure environment for this session. The patient's condition being diagnosed/treated is appropriate for telemedicine. The provider identified herself as well as her credentials. The patient, and/or patients guardian, consent to be seen remotely, and when consent is given they understand that the consent allows for patient identifiable information to be sent to a third party as needed. They may refuse to be seen remotely at any time. The electronic data is encrypted and password protected, and the patient and/or guardian has been advised of the potential risks to privacy not withstanding such measures.    You have chosen to receive care through a telehealth visit.  Do you consent to use a video/audio connection for your medical care today? Yes    Patient identifiers utilized: Name and date of birth.    Patient verbally confirmed consent for today's encounter 06/04/2025.    The patient does verbally confirm they are being seen today while physically located in the [facility].  This provider/this APRN is licensed in the [facility] where the patient is located/being seen.           Subjective   Morro Blancas is a 34 y.o. male who presents today for initial evaluation     Chief Complaint: Anxiety, depression    Accompanied by: Patient's wife, Mahsa    History of Present Illness:   - The patient presents today for initial evaluation for medication management.  The patient was referred by his PCP.  The patient states that he has a longstanding history of depression and anxiety.  He states that symptoms of depression and anxiety developed for him at age 17 when he was diagnosed  "with diabetes.  He states that being diagnosed with diabetes changed his life as he endorses that he was used to eating certain things and then suddenly told he could no longer consume those types of things.  He states that from there he feels like his depression and anxiety \"just spiraled\" and that symptoms progressed over the years.  He states that around 3 years ago his primary care provider started him on Cymbalta, which she endorses was really helpful for management of his depression and anxiety symptoms for a while.  He states that a few months ago he started feeling as if the medication was not quite as effective as it was initially.  He states that in March his mother passed away and states that since then his depression and anxiety symptoms have been significantly exacerbated, but more so his anxiety.  He states for example that he is frequently finding himself worrying about the health, safety, and wellbeing of his family members.  He states that he can be doing something and then something triggered him to think about his mother or one of his nieces or nephews and then he feels like he is \"freaking out\" thinking about things.  He states for example after having Sunday dinner with his family and going home he will feel like he is not spend enough time with them or fear that they are mad at him and that he needs to call him.  He states that he is never really fear of death and has always looked at it as a natural thing, but states that since his mother passed away it has been really difficult for him and thinks it has caused him to look at life differently now.  He states that he is experiencing some heightened depression currently as well, but states that he feels his main concern currently is his anxiety.  The patient and his wife also endorsed that the patient will be very interested in something for a month or so and then just kind of dropped it.  The patient states for example that recently he was " really into Star Wars for a while but states that after a month or so he just lost interest encounter dropped it.  The patient states that he is not sure if this is related to his depression and anxiety or what, but states that this has been something he has noticed over the past few months.  The patient and his wife endorse that the patient's primary care provider was concerned he was exhibiting symptoms of ADHD and encouraged the patient to be evaluated for this today.  Discussed with the patient and his wife that it seems his symptoms are likely more related to recent exacerbation of depression and anxiety symptoms and that we will plan to discuss medication adjustment/changes to address this today.  Discussed with the patient that if the symptoms or any other concerning symptoms for ADHD are present and unchanged once depression and anxiety are more well-controlled we can discuss further evaluation and pursuing psychological testing in effort to rule out ADHD.  The patient and his wife verbalized understanding in their own words and endorse that they are agreeable to this.    -The patient endorses significant symptoms of depression including: changes in sleep, reduced interests in activities, feelings of guilt, changes in energy level, difficulty with concentration, change in appetite, and psychomotor changes which have caused impairment in important areas of daily functioning.      -The patient endorses significant symptoms of anxiety including: excessive anxiety and worry about a number of events or activities for more days than not, restlessness or feeling keyed up, being easily fatigued, difficulty concentrating or mind going blank, irritability, muscle tension, and sleep disturbance which have caused impairment in important areas of daily functioning.      -Patient rates symptoms of depression at a 5/10 on a 0-10 scale, with 10 being the worst.   -Patient rates symptoms of anxiety at a 8-9/10 on a 0-10  "scale, with 10 being the worst.    -Appetite reported as: \"okay\", but reports appetite is sometimes diminished related to gastroparesis and other GI issues.  Estimates that he eats 1-2 meals per day but in addition to this reports he drinks a lot of protein shakes.  Reports he frequently finds himself fearful of flaring up his gastroparesis, so endorses that typically he likes to drink more protein shakes and effort to avoid this.  -Sleep reported as: \"not great\".  Reports he has struggled with sleep even since childhood.  He states he has trouble both falling and staying asleep.  He states that he feels anxiety as a contributor to his sleeping difficulties, but states that even when he is not feeling anxious he continues to have difficulty sleeping sometimes.  He estimates he sleeps approximately 4-5 hours per night.  Reports he has some occasional, random bedrooms, but denies anything consistent or distressing.    -Reported medication compliance: The patient reports compliance with their current psychotropic medication regimen.    -Reported medication side effects or concerns: Denies    -Auditory or visual hallucinations: Denies  -Behaviors different from patient baseline, or any reckless, impulsive, or risky behaviors: Denies  -Symptoms of reynaldo or psychosis: Denies  -Self-injurious behavior: Denies  -SI/HI: The patient adamantly denies any suicidal or homicidal ideations, plans, or intent at the time of this encounter and is convincing.  -No abnormal muscle movements or tics noted by this APRN during today's encounter, and the patient denies any of these symptoms.      -Using a shared decision-making approach the patient reports he would be interested in increasing the dose of Cymbalta in effort to obtain better management of symptoms at this time.    -The patient does verbally contract for safety at today's encounter and is in verbal agreement with the safety/crisis plan. The patient reports in their own words " that they will reach out to this APRN/office prior to next scheduled appointment if there is any worsening of mood, any new psychiatric symptoms, any medication side effects or concerns, any concern for safety to self or others, any suicidal or homicidal ideations plans or intent, or any concerns, or they will call 911, call or text the suicide and crisis lifeline at [phone], or go to the closest emergency department.                Current Psychiatric Medications:  Cymbalta 30 mg daily - reports he has been taking this medication for approximately 3 years now.  Reports that initially it seemed to be very effective for managing his depression and anxiety, but states that over the past few months it does not seem to be quite as effective for him.  He states that maybe a month or so before his mother passed away he noticed it was not quite as effective as it used to be, but states that since his mom has passed away he does not feel that it is helping at all.    Prior Psychiatric Medications:  Paxil - reports made him feel like a zombie/caused emotional flattening    Currently in Counseling or Therapy:  Denies    Prior Psychiatric Outpatient Care:  Denies.  States that he has only ever seen his PCP for psychotropic medication management.    Prior Psychiatric Hospitalizations:  Reports he was inpatient for a 72-hour hold at [facility] for a suicide attempt.    Previous Suicide Attempts:  Reports a history of one suicide attempts around 2010 or 2011.  Reports that he attempted to overdose with insulin.  Reports that at the time he was dating a girl who was cheating on him but also making a lot of comments about how difficult it was being in a relationship with a diabetic like him.  He reports that he felt his depression and anxiety were uncontrolled at that time and all of the situational stressors that he was dealing with contributed to an exacerbation of symptoms and led to his suicide attempt.    Previous  Self-Harming Behavior:  Denies    Any family history of suicide attempts:  Denies    Legal History, Arrests, or Incarcerations:  No current legal charges pending.  Denies any history of arrests or incarcerations.     History of Seizures or TBI:  Denies    Highest Level of Education:  High school diploma     Employment:  Disabled     History:  Denies    Substance Abuse History:  Alcohol: Denies any alcohol use in the past 4 years since being diagnosed with gastroparesis.  Reports a history of using alcohol in the past.  Reports that for many years he used alcohol occasionally/recreationally, but states that during COVID his alcohol use progressed to daily, excessive use.  Smoking/Cigarettes: Reports he has been smoking cigarettes since approximately age 17.  Reports that he currently smoking less than 1/2 pack/day.  Vaping: Denies  Smokeless Tobacco: Denies  Illicit Substances: Reports occasional marijuana use.  States for example that he may smoke a bowl of marijuana once every couple of days.  Reports he has tried other drugs recreationally in the past, but states that the only thing he has ever used consistently is marijuana.  Prescription Medication Misuse: Denies    Social History:  Born: [place]  Raised: [place]  Currently resides in [place], [place]  Marriage status:  x4 months  Children: None  Lives with: The patient's currently household consists of the patient and his wife.     Trauma/Abuse History:  Denies    Patient's Support Network Includes:  family (dad, brother, 2 sisters), wife, and 2 best friends          The following portions of the patient's history were reviewed and updated as appropriate: allergies, current medications, past family history, past medical history, past social history, past surgical history and problem list.          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        Social History:  Social History     Socioeconomic History    Marital status: Significant Other   Tobacco Use    Smoking status: Every Day     Current packs/day: 0.50     Average packs/day: 0.5 packs/day for 20.4 years (10.2 ttl pk-yrs)     Types: Cigarettes     Start date: 1/1/2005    Smokeless tobacco: Never   Vaping Use    Vaping status: Never Used   Substance and Sexual Activity    Alcohol use: Not Currently    Drug use: Yes     Frequency: 2.0 times per week     Types: Marijuana     Comment: 1-2 per week    Sexual activity: Yes     Partners: Female       Family History:  Family History   Problem Relation Age of Onset    Cancer Mother     Hypertension Father     Depression Maternal Uncle     Hypertension Maternal Grandfather     Alcohol abuse Maternal Grandfather     Depression Maternal Grandmother     Hypertension Maternal Grandmother     Irritable bowel syndrome Maternal Grandmother     Colon cancer Maternal Grandmother     Heart disease Paternal Grandfather     Hypertension Paternal Grandfather     Hypertension Paternal Grandmother     Cancer Paternal Grandmother     COPD Paternal Grandmother        Past Surgical History:  Past Surgical History:   Procedure Laterality Date    COLONOSCOPY N/A 08/30/2017    One 3 mm polyp in the sigmoid colon, Granularity in the terminal ileum    ENDOSCOPY N/A 04/15/2021    Procedure: ESOPHAGOGASTRODUODENOSCOPY with biopsies;  Surgeon: Da Fleming MD;  Location: [facility] ENDOSCOPY;  Service: Gastroenterology;  Laterality: N/A;  PRE:   nausea, vomiting, hematemesis  POST:  reflux esophagitis, erosive  gastropathy, scalloped mucosa in duodenum ? 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      Continuous Blood Gluc Sensor (Dexcom G6 Sensor)       Continuous Blood Gluc Transmit (Dexcom G6 Transmitter) misc USE ONE TRANSMITTER EVERY 90 DAYS      diphenhydrAMINE (BENADRYL) 50 MG/ML injection       DULoxetine (CYMBALTA) 60 MG capsule Take 1 capsule by mouth Daily. 30 capsule 0    Fiasp 100 UNIT/ML solution Per directions for insulin pump      Glucagon, rDNA, (Glucagon Emergency) 1 MG kit As Needed.      Gvoke HypoPen 2-Pack 1 MG/0.2ML solution auto-injector USE AS NEEDED FOR SEVERE HYPOGLYCEMIA      Insulin Disposable Pump (OmniPod Dash 5 Pack Pods) misc 0.85 Units. Basal rate recently decreased 1.5 u/hr to 0.85 u/hr   1 unit per 12 carb  Correction factor 1unit=45mg/dL  Goal 150       lisinopril (PRINIVIL,ZESTRIL) 5 MG tablet Take 1 tablet by mouth Daily.      metoclopramide (REGLAN) 5 MG tablet TAKE 1 TABLET(5 MG) BY MOUTH FOUR TIMES DAILY      multivitamin with minerals tablet tablet Take 1 tablet by mouth Daily. Chewable gummie      omeprazole (priLOSEC) 40 MG capsule       ondansetron (ZOFRAN) 2 mg/mL injection       ondansetron ODT (ZOFRAN-ODT) 4 MG disintegrating tablet Take 1 tablet by mouth Every 8 (Eight) Hours As Needed.      promethazine (PHENERGAN) 25 MG/ML  injection       Scopolamine 1 MG/3DAYS patch        No current facility-administered medications for this visit.       Review of Symptoms:    Review of Systems   Constitutional:  Positive for activity change and fatigue. Negative for appetite change, unexpected weight gain and unexpected weight loss.   Psychiatric/Behavioral:  Positive for decreased concentration, dysphoric mood, sleep disturbance, depressed mood and stress. Negative for agitation, behavioral problems, hallucinations, self-injury, suicidal ideas and negative for hyperactivity. The patient is nervous/anxious.          Physical Exam:   There were no vitals taken for this visit. There is no height or weight on file to calculate BMI.     The patient was seen remotely today via a MyChart Video Visit through [facility].  Unable to obtain vital signs due to nature of remote visit.  Height stated at 70 inches.  Weight stated at 119 pounds.       Physical Exam  Constitutional:       Appearance: Normal appearance.   Neurological:      Mental Status: He is alert.   Psychiatric:         Attention and Perception: Attention and perception normal.         Mood and Affect: Affect normal. Mood is anxious and depressed.         Speech: Speech normal.         Behavior: Behavior normal. Behavior is cooperative.         Thought Content: Thought content normal. Thought content does not include homicidal or suicidal ideation. Thought content does not include homicidal or suicidal plan.         Cognition and Memory: Cognition and memory normal.         Judgment: Judgment normal.           Mental Status Exam:   Hygiene:   good  Cooperation:  Cooperative  Eye Contact:  Good  Psychomotor Behavior:  Appropriate  Affect:  Full range  Mood: normal  Hopelessness: Denies  Speech:  Normal  Thought Process:  Goal directed and Linear  Thought Content:  Normal  Suicidal:  None  Homicidal:  None  Hallucinations:  None  Delusion:  None  Memory:  Intact  Orientation:  Person, Place, Time, and  Situation  Reliability:  good  Insight:  Good  Judgement:  Good  Impulse Control:  Good  Physical/Medical Issues:  Yes see medical history             PHQ-9 Depression Screening  Little interest or pleasure in doing things? (Patient-Rptd) Several days   Feeling down, depressed, or hopeless? (Patient-Rptd) Several days   PHQ-2 Total Score (Patient-Rptd) 2   Trouble falling or staying asleep, or sleeping too much? (Patient-Rptd) Over half   Feeling tired or having little energy? (Patient-Rptd) Over half   Poor appetite or overeating? (Patient-Rptd) Several days   Feeling bad about yourself - or that you are a failure or have let yourself or your family down? (Patient-Rptd) Several days   Trouble concentrating on things, such as reading the newspaper or watching television? (Patient-Rptd) Several days   Moving or speaking so slowly that other people could have noticed? Or the opposite - being so fidgety or restless that you have been moving around a lot more than usual? (Patient-Rptd) Almost all   Thoughts that you would be better off dead, or of hurting yourself in some way? (Patient-Rptd) Several days   PHQ-9 Total Score (Patient-Rptd) 13   If you checked off any problems, how difficult have these problems made it for you to do your work, take care of things at home, or get along with other people? (Patient-Rptd) Somewhat difficult       MATI-7  Feeling nervous, anxious or on edge: (Patient-Rptd) Nearly every day  Not being able to stop or control worrying: (Patient-Rptd) Nearly every day  Worrying too much about different things: (Patient-Rptd) Nearly every day  Trouble Relaxing: (Patient-Rptd) Nearly every day  Being so restless that it is hard to sit still: (Patient-Rptd) Nearly every day  Feeling afraid as if something awful might happen: (Patient-Rptd) Nearly every day  Becoming easily annoyed or irritable: (Patient-Rptd) Several days  MATI 7 Total Score: (Patient-Rptd) 19  If you checked any problems, how  difficult have these problems made it for you to do your work, take care of things at home, or get along with other people: (Patient-Rptd) Very difficult              Lab Results:   No visits with results within 1 Month(s) from this visit.   Latest known visit with results is:   Orders Only on 03/12/2024   Component Date Value Ref Range Status    Glucose 04/12/2024 335 (H)  70 - 99 mg/dL Final    BUN 04/12/2024 15  6 - 20 mg/dL Final    Creatinine 04/12/2024 0.85  0.76 - 1.27 mg/dL Final    EGFR Result 04/12/2024 118  >59 mL/min/1.73 Final    BUN/Creatinine Ratio 04/12/2024 18  9 - 20 Final    Sodium 04/12/2024 135  134 - 144 mmol/L Final    Potassium 04/12/2024 4.5  3.5 - 5.2 mmol/L Final    Chloride 04/12/2024 96  96 - 106 mmol/L Final    Total CO2 04/12/2024 26  20 - 29 mmol/L Final    Calcium 04/12/2024 9.8  8.7 - 10.2 mg/dL Final    Total Protein 04/12/2024 8.1  6.0 - 8.5 g/dL Final    Albumin 04/12/2024 4.5  4.1 - 5.1 g/dL Final    Globulin 04/12/2024 3.6  1.5 - 4.5 g/dL Final    A/G Ratio 04/12/2024 1.3  1.2 - 2.2 Final    Total Bilirubin 04/12/2024 0.3  0.0 - 1.2 mg/dL Final    Alkaline Phosphatase 04/12/2024 110  44 - 121 IU/L Final    AST (SGOT) 04/12/2024 18  0 - 40 IU/L Final    ALT (SGPT) 04/12/2024 28  0 - 44 IU/L Final    Hemoglobin A1C 04/12/2024 9.7 (H)  4.8 - 5.6 % Final    Comment:          Prediabetes: 5.7 - 6.4           Diabetes: >6.4           Glycemic control for adults with diabetes: <7.0      Total Cholesterol 04/12/2024 183  100 - 199 mg/dL Final    Triglycerides 04/12/2024 143  0 - 149 mg/dL Final    HDL Cholesterol 04/12/2024 56  >39 mg/dL Final    VLDL Cholesterol Tony 04/12/2024 25  5 - 40 mg/dL Final    LDL Chol Calc (NIH) 04/12/2024 102 (H)  0 - 99 mg/dL Final    Chol/HDL Ratio 04/12/2024 3.3  0.0 - 5.0 ratio Final    Comment:                                   T. Chol/HDL Ratio                                              Men  Women                                1/2 Avg.Risk  3.4     3.3                                    Avg.Risk  5.0    4.4                                 2X Avg.Risk  9.6    7.1                                 3X Avg.Risk 23.4   11.0      Creatinine, Urine 04/12/2024 64.9  Not Estab. mg/dL Final    Microalbumin, Urine 04/12/2024 538.9  Not Estab. ug/mL Final    Comment: Results confirmed on  dilution.      Microalbumin/Creatinine Ratio 04/12/2024 830 (H)  0 - 29 mg/g creat Final    Comment:                        Normal:                0 -  29                         Moderately increased: 30 - 300                         Severely increased:       >300           Assessment & Plan   Diagnoses and all orders for this visit:    1. Generalized anxiety disorder (Primary)  -     DULoxetine (CYMBALTA) 60 MG capsule; Take 1 capsule by mouth Daily.  Dispense: 30 capsule; Refill: 0  -     Ambulatory Referral to Psychiatry    2. Moderate episode of recurrent major depressive disorder  -     DULoxetine (CYMBALTA) 60 MG capsule; Take 1 capsule by mouth Daily.  Dispense: 30 capsule; Refill: 0  -     Ambulatory Referral to Psychiatry        Visit Diagnoses:    ICD-10-CM ICD-9-CM   1. Generalized anxiety disorder  F41.1 300.02   2. Moderate episode of recurrent major depressive disorder  F33.1 296.32          GOALS:  Short Term Goals: Patient will be compliant with medication, and patient will have no significant medication related side effects.  Patient will be engaged in psychotherapy as indicated.  Patient will report subjective improvement of symptoms.  Long term goals: To stabilize mood and treat/improve subjective symptoms, the patient will stay out of the hospital, the patient will be at an optimal level of functioning, and the patient will take all medications as prescribed.  The patient verbalized understanding and agreement with goals that were mutually set.      TREATMENT PLAN: Continue supportive psychotherapy efforts and medications as indicated.  Medication and treatment options,  both pharmacological and non-pharmacological treatment options, discussed during today's visit, including any off label use of medication. Patient acknowledged and verbally consented with current treatment plan and was educated on the importance of compliance with treatment and follow-up appointments.          - Increase Cymbalta to 60 mg daily for depression/anxiety  - Begin psychotherapy per patient's request.  Referral placed to begin therapy with the [facility] at today's encounter.          MEDICATION ISSUES: Discussed treatment plan and medication options of prescribed medication, including any off label use of medication, as well as the risks, benefits, black box warnings, and side effects including sedation or drowsiness and potential falls, possible impaired driving, worsened or change in mood, suicidal and or homicidal ideations, changes in weight, and metabolic adversities among others. Patient is agreeable to call the office with any worsening of symptoms or onset of side effects, or if any concerns or questions arise.  The contact information for the office is available to the patient, telephone number [phone]. Patient is agreeable to call 911 or go to the nearest emergency department should they begin having any suicidal or homicidal ideations, plans, or intent, or if any urgent concerns arise. No medication side effects or related complaints today.      Important educational information made available and provided in after visit summary for patient to review.     Due to the nature of virtual visits and inability to monitor vital signs and weight with virtual visits, the patient has been encouraged to monitor their vital signs and weight regularly either through self-monitoring via home device(s) or with their Primary Care Provider, and the patient has been instructed to notify this APRN of any abnormalities or changes from baseline.     Patient aware of limitations of  provider's availability and office hours, and how to reach provider/office if needed (office number for patient to call for any questions/concerns: [phone]). If the patient's needs require more frequent or intensive management/monitoring than can be provided from this provider utilizing a strictly virtual platform, or care that is outside of this provider's scope, then patient may be referred to more appropriate provider or modality.                       VERBAL INFORMED CONSENT FOR MEDICATION:  The patient was educated that their proposed/prescribed psychotropic medication(s) has potential risks, side effects, adverse effects, and black box warnings; and these have been discussed with the patient.  The patient has been informed that their treatment and medication dosage is to be individualized, and may even be above or below the recommended range/dosage due to patient individualization and response, but medication is prescribed using a shared decision making approach, and no medication or dosage will be prescribed without the patient's verbal consent.  The reason for the use of the medication including any off label use and alternative modes of treatment other than or in addition to medication has been considered and discussed, the probable consequences of not receiving the proposed treatment have been discussed, and any treatment side effects, black box warnings, and cautions associated with treatment have been discussed with the patient.  The patient is allowed ample time to openly discuss and ask questions regarding the proposed medication(s) and treatment plan and the patient verbalizes understanding the reasons for the use of the medication, its potential risks and benefits, other alternative treatment(s), and the probable consequences that may occur if the proposed medication is not given.  The patient has been given ample time to ask questions and study the information and find the information to be  specific, accurate, and complete.  The patient gives verbal consent for the medication(s) proposed/prescribed, they verbalized understanding that they can refuse and withdraw consent at any time with the assistance of this APRN, and the patient has verbally confirmed that they are aware, and are willing, to take the prescribed medication and follow the treatment plan with the known possible risks, side effect, black box warnings, and any potential medication interactions, and the patient reports they will be worse off without this medication and treatment plan.  The patient is advised to contact this APRN/this office if any questions or concerns arise at any time (at [phone]), or call 911/go to the closest emergency department if needed or outside of office hours.        SUICIDE RISK ASSESSMENT AND SAFETY PLAN: Unalterable demographics and a history of mental health intervention indicate this patient is in a high risk category compared to the general population. At present, the patient denies active SI/HI, intentions, or plans at this time and agrees to seek immediate care should such thoughts develop. The patient verbalizes understanding of how to access emergency care if needed and agrees to do so. Consideration of suicide risk and protective factors such as history, current presentation, individual strengths and weaknesses, psychosocial and environmental stressors and variables, psychiatric illness and symptoms, medical conditions and pain, took place in this interview. Based on those considerations, the patient is determined: within individual baseline and presenting no imminent risk for suicide or homicide. Other recommendations: The patient does not meet the criteria for inpatient admission and is not a safety risk to self or others at today's visit. Inpatient treatment offers no significant advantages over outpatient treatment for this patient at today's visit.  The patient was given ample time for  questions and fully participated in treatment planning.  The patient was encouraged to call the clinic with any questions or concerns.  The patient was informed of access to emergency care. If patient were to develop any significant symptomatology, suicidal ideation, homicidal ideation, any concerns, or feel unsafe at any time they are to call the clinic and if unable to get immediate assistance should immediately call 911 or go to the nearest emergency room.  Patient contracted verbally for the following: If you are experiencing an emotional crisis or have thoughts of harming yourself or others, please go to your nearest local emergency room or call 911. Will continue to re-assess medication response and side effects frequently to establish efficacy and ensure safety. Risks, any black box warnings, side effects, off label usage, and benefits of medication and treatment discussed with patient, along with potential adverse side effects of current and/or newly prescribed medication, alternative treatment options, and OTC medications.  Patient verbalized understanding of potential risks, any off label use of medication, any black box warnings, and any side effects in their own words. The patient verbalized understanding and agreed to comply with the safety plan discussed in their own words.  Patient given the number to the office. Number also discussed of the 24- hour suicide hotline.         MEDS ORDERED DURING VISIT:  New Medications Ordered This Visit   Medications    DULoxetine (CYMBALTA) 60 MG capsule     Sig: Take 1 capsule by mouth Daily.     Dispense:  30 capsule     Refill:  0       Future Appointments         Provider Department Center    7/3/2025 4:00 PM Rina Wall APRN [facility]             Return in about 4 weeks (around 7/2/2025), or if symptoms worsen or fail to improve, for Recheck.           The patient will follow-up in approximately 4 weeks, and  follow-up appointment has been scheduled with the patient confirming agreeability/availability for date/time during today's encounter.  Instructed the patient to contact the [facility] if they have any questions or concerns, and that a sooner appointment will be provided if needed.  The patient verbalized understanding in their own words and endorsed that they are agreeable to this.      Functional Status: Moderate impairment     Prognosis: Fair with Ongoing Treatment              This document has been electronically signed by YUDELKA Hardin  June 4, 2025 14:29 EDT    Part of this note may be an electronic transcription/translation of spoken language to printed text using the Dragon Dictation System.

## 2025-06-30 ENCOUNTER — TELEPHONE (OUTPATIENT)
Dept: PSYCHIATRY | Facility: CLINIC | Age: 35
End: 2025-06-30

## 2025-07-07 ENCOUNTER — TELEPHONE (OUTPATIENT)
Dept: PSYCHIATRY | Facility: CLINIC | Age: 35
End: 2025-07-07

## 2025-07-09 DIAGNOSIS — F41.1 GENERALIZED ANXIETY DISORDER: Chronic | ICD-10-CM

## 2025-07-09 DIAGNOSIS — F33.1 MODERATE EPISODE OF RECURRENT MAJOR DEPRESSIVE DISORDER: Chronic | ICD-10-CM

## 2025-07-09 RX ORDER — DULOXETIN HYDROCHLORIDE 60 MG/1
60 CAPSULE, DELAYED RELEASE ORAL DAILY
Qty: 30 CAPSULE | Refills: 0 | Status: SHIPPED | OUTPATIENT
Start: 2025-07-09

## 2025-07-16 ENCOUNTER — TELEPHONE (OUTPATIENT)
Dept: PSYCHIATRY | Facility: CLINIC | Age: 35
End: 2025-07-16

## (undated) DEVICE — TBG 02 CRUSH RESIST LF CLR 7FT

## (undated) DEVICE — FRCP BIOP RADLJAW4 HOT 2.2X240 BX40

## (undated) DEVICE — FRCP BX RADJAW4 NDL 2.8 240CM LG OG BX40

## (undated) DEVICE — Device: Brand: DEFENDO AIR/WATER/SUCTION AND BIOPSY VALVE

## (undated) DEVICE — SENSR O2 OXIMAX FNGR A/ 18IN NONSTR

## (undated) DEVICE — TUBING, SUCTION, 1/4" X 10', STRAIGHT: Brand: MEDLINE

## (undated) DEVICE — CANN O2 ETCO2 FITS ALL CONN CO2 SMPL A/ 7IN DISP LF

## (undated) DEVICE — CANN NASL CO2 TRULINK W/O2 A/

## (undated) DEVICE — ADAPT CLN BIOGUARD AIR/H2O DISP

## (undated) DEVICE — KT ORCA ORCAPOD DISP STRL

## (undated) DEVICE — THE TORRENT IRRIGATION SCOPE CONNECTOR IS USED WITH THE TORRENT IRRIGATION TUBING TO PROVIDE IRRIGATION FLUIDS SUCH AS STERILE WATER DURING GASTROINTESTINAL ENDOSCOPIC PROCEDURES WHEN USED IN CONJUNCTION WITH AN IRRIGATION PUMP (OR ELECTROSURGICAL UNIT).: Brand: TORRENT

## (undated) DEVICE — BITEBLOCK OMNI BLOC

## (undated) DEVICE — LN SMPL CO2 SHTRM SD STREAM W/M LUER

## (undated) DEVICE — BRUSH CYTO COLONOSCOPE 7F3MM 240CM RED